# Patient Record
Sex: FEMALE | Race: WHITE | NOT HISPANIC OR LATINO | Employment: PART TIME | URBAN - METROPOLITAN AREA
[De-identification: names, ages, dates, MRNs, and addresses within clinical notes are randomized per-mention and may not be internally consistent; named-entity substitution may affect disease eponyms.]

---

## 2017-01-18 ENCOUNTER — ALLSCRIPTS OFFICE VISIT (OUTPATIENT)
Dept: OTHER | Facility: OTHER | Age: 42
End: 2017-01-18

## 2017-06-30 ENCOUNTER — ALLSCRIPTS OFFICE VISIT (OUTPATIENT)
Dept: OTHER | Facility: OTHER | Age: 42
End: 2017-06-30

## 2018-01-12 NOTE — PSYCH
Psych Med Mgmt    Appearance: was calm and cooperative  Observed mood: anxious  Observed mood: affect was tearful  Speech: pressured  Thought processes: coherent/organized  Hallucinations: no hallucinations present  Thought Content: no delusions  Abnormal Thoughts: The patient has no suicidal thoughts and no homicidal thoughts  Orientation: The patient is oriented to person, place and time  Recent and Remote Memory: short term memory intact and long term memory intact  Attention Span And Concentration: concentration intact  Insight: Insight intact  Judgment: Her judgment was intact  Muscle Strength And Tone  Normal gait and station  Individual Psychotherapy minutes provided today  Goals addressed in session: Medications/ chronicity of her anxiety/depression  Need for mediation long term  Potential side effects         Treatment Recommendations: Effexor  mg 2 po qd  Xanax 1 mg po tid prn  Risks, Benefits And Possible Side Effects Of Medications: Risks, benefits, and possible side effects of medications explained to patient and patient verbalizes understanding  Discussed with patient Black Box warning on concurrent use of benzodiazepines and opioid medications including sedation, respiratory depression, coma and death  Patient understands the risk of treatment with benzodiazepines in addition to opioids and wants to continue taking those medications  She reports normal appetite, normal energy level, no weight change and normal number of sleep hours  Pt seen for follow up MDD/ KATHERYN  Pt overall states she is "managing" things  SHe is feeling good but not "wonderful"- she is able to enjoy herself and spending time with family  She has difficulty functioning when she does not have a schedule  She had some difficulties over holidays due to disruption in her schedule  She appears anxious with rambling speech but coherent  Less tearful   She is needing xanax at night  She is driving by herself but continues with panic/ anxiety- especially on long trips  She states her  is working on developing his own business and has been on "the road" more  She states this has been helpful because she has been forced to d more things  She is also working again part time doing taxes  Her children are 8and 10years old  Vitals  Signs   Recorded: 61XOU6456 11:56AM   Heart Rate: 68  Respiration: 16  Systolic: 765  Diastolic: 71  Height: 5 ft 4 in  Weight: 190 lb   BMI Calculated: 32 61  BSA Calculated: 1 91    DSM    Provisional Diagnosis: MDD  KATHERYN  Assessment    1  Generalized anxiety disorder (300 02) (F41 1)   2  Recurrent major depressive disorder (296 30) (F33 9)    Plan    1  ALPRAZolam 1 MG Oral Tablet; 1 po tid prn    2  Omeprazole 40 MG Oral Capsule Delayed Release    3  Venlafaxine HCl  MG Oral Tablet Extended Release 24 Hour; 2 CAPS PO   QD    Review of Systems    Constitutional: No fever, no chills, feels well, no tiredness, no recent weight gain or loss  Cardiovascular: no complaints of slow or fast heart rate, no chest pain, no palpitations  Respiratory: no complaints of shortness of breath, no wheezing, no dyspnea on exertion  Gastrointestinal: no complaints of abdominal pain, no constipation, no nausea, no diarrhea, no vomiting  Genitourinary: no complaints of dysuria, no incontinence, no pelvic pain, no urinary frequency  Musculoskeletal: no complaints of arthralgia, no myalgias, no limb pain, no joint stiffness  Integumentary: no complaints of skin rash, no itching, no dry skin  Neurological: no complaints of headache, no confusion, no numbness, no dizziness  Active Problems    1  Chronic fatigue (780 79) (R53 82)   2  Generalized anxiety disorder (300 02) (F41 1)   3  GERD (gastroesophageal reflux disease) (530 81) (K21 9)   4  Migraine (346 90) (G43 909)   5  Other specified anxiety disorders (300 09) (F41 8)   6  Pre-operative cardiovascular examination (V72 81) (Z01 810)   7  Recurrent major depressive disorder (296 30) (F33 9)   8  Varicose veins of leg with edema, 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)   9  Varicose veins of leg with pain, 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 819)   10  Vitamin D deficiency (268 9) (E55 9)    Past Medical History    1  Normal delivery 21   9)    Allergies    1  No Known Drug Allergies    Current Meds   1  ALPRAZolam 1 MG Oral Tablet; 1 po tid prn; Last Rx:16Nov2016 Ordered   2  Butalbital-APAP-Caffeine -40 MG Oral Tablet; TAKE 1 TABLET 3 TIMES DAILY AS   NEEDED; Therapy: 92Fjk8462 to (Evaluate:86Otf2905)  Requested for: 02Tsl0518; Last   Rx:54Svl4991 Ordered   3  Multiple Vitamins Oral Tablet; Take 1 daily; Therapy: 52CUF7388 to (Last Rx:01Oct2014) Ordered   4  Omeprazole 40 MG Oral Capsule Delayed Release; TAKE 1 CAPSULE DAILY; Therapy: 14Uxv7437 to (Evaluate:68Twq0680)  Requested for: 74Hez6057; Last   Rx:23Wlq7087 Ordered   5  Venlafaxine HCl  MG Oral Tablet Extended Release 24 Hour; 2 CAPS PO QD    Requested for: 98FZO9660; Last Rx:16Nov2016 Ordered    The medication list was reviewed and updated today  Family Psych History  Mother    1  No pertinent family history    The family history was reviewed and updated today  Social History    ·    · Never A Smoker   · No alcohol use  The social history was reviewed and updated today  The social history was reviewed and is unchanged  End of Encounter Meds    1  Multiple Vitamins Oral Tablet; Take 1 daily; Therapy: 12IJE5249 to (Last Rx:01Oct2014) Ordered    2  ALPRAZolam 1 MG Oral Tablet; 1 po tid prn; Last Rx:18Jan2017 Ordered    3  Butalbital-APAP-Caffeine -40 MG Oral Tablet; TAKE 1 TABLET 3 TIMES DAILY AS   NEEDED; Therapy: 29Oce2788 to (Evaluate:14Dyy5052)  Requested for: 33Pck4433; Last   Rx:51Del8820 Ordered    4   Venlafaxine HCl  MG Oral Tablet Extended Release 24 Hour; 2 CAPS PO QD    Requested for: 90GHG9877; Last Rx:18Jan2017; Status: 1554 Sur[facility]  to Pharmacy -   Awaiting Verification Ordered    Signatures   Electronically signed by : Calderon Hanks [facility]; Jan 18 2017 12:04PM EST                       (Author)    Electronically signed by : Criss Long MD; Jan 18 2017  5:20PM EST

## 2018-01-15 VITALS
BODY MASS INDEX: 32.44 KG/M2 | HEART RATE: 68 BPM | RESPIRATION RATE: 16 BRPM | DIASTOLIC BLOOD PRESSURE: 71 MMHG | SYSTOLIC BLOOD PRESSURE: 112 MMHG | WEIGHT: 190 LBS | HEIGHT: 64 IN

## 2018-01-16 NOTE — PSYCH
Psych Med Mgmt    Appearance: was calm and cooperative  Observed mood: anxious  Observed mood: affect was constricted  Speech: pressured  Thought processes: coherent/organized  Hallucinations: no hallucinations present  Thought Content: no delusions  Abnormal Thoughts: The patient has no suicidal thoughts and no homicidal thoughts  Orientation: The patient is oriented to person, place and time  Recent and Remote Memory: short term memory intact and long term memory intact  Attention Span And Concentration: concentration intact  Insight: Insight intact  Judgment: Her judgment was intact  Treatment Recommendations: Effexor  mg 2 po qd  Xanax 1 mg po qid prn  Risks, Benefits And Possible Side Effects Of Medications: Risks, benefits, and possible side effects of medications explained to patient and patient verbalizes understanding  Discussed with patient the risks of sedation, respiratory depression, impairment of ability to drive and potential for abuse and addiction related to treatment with benzodiazepine medications  The patient understands risk of treatment with benzodiazepine medications, agrees to not drive if feels impaired and agrees to take medications as prescribed  She reports normal appetite, normal energy level, no weight change and normal number of sleep hours  Pt seen for follow up Major Depression/ KATHERYN  Pt states she lost her insurance benefits November last year so has been unable to come in  She states her spouse then lost his job  She continues with anxiety/ residual depression  On a positive note 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/ flexible hours  She states she has been off the wellbutrin for 3 weeks  She states she had some increased headaches which are improving the past few days  She continues with residual anxiety/ depression/ easily tearful     She also has symptoms of seasonal affective disorder and discussed  Vitals  Signs   Recorded: 62IHL0620 04:26ZI   Systolic: 005  Diastolic: 73  Heart Rate: 74  Respiration: 16  Height: 5 ft 4 in  Weight: 190 lb   BMI Calculated: 32 61  BSA Calculated: 1 91    DSM    Provisional Diagnosis: KATHERYN  MDD  Assessment    1  Major depression, recurrent (296 30) (F33 9)   2  Generalized anxiety disorder (300 02) (F41 1)    Plan    1  ALPRAZolam 1 MG Oral Tablet; 1 po tid prn    2  Venlafaxine HCl  MG Oral Tablet Extended Release 24 Hour; 2 CAPS PO   QD    Review of Systems    Constitutional: No fever, no chills, feels well, no tiredness, no recent weight gain or loss  Cardiovascular: no complaints of slow or fast heart rate, no chest pain, no palpitations  Respiratory: no complaints of shortness of breath, no wheezing, no dyspnea on exertion  Gastrointestinal: no complaints of abdominal pain, no constipation, no nausea, no diarrhea, no vomiting  Genitourinary: no complaints of dysuria, no incontinence, no pelvic pain, no urinary frequency  Musculoskeletal: no complaints of arthralgia, no myalgias, no limb pain, no joint stiffness  Integumentary: no complaints of skin rash, no itching, no dry skin  Neurological: no complaints of headache, no confusion, no numbness, no dizziness  Active Problems    1  Chronic fatigue (780 79) (R53 82)   2  Generalized anxiety disorder (300 02) (F41 1)   3  GERD (gastroesophageal reflux disease) (530 81) (K21 9)   4  Major depression, recurrent (296 30) (F33 9)   5  Migraine (346 90) (G43 909)   6  Other specified anxiety disorders (300 09) (F41 8)   7  Pre-operative cardiovascular examination (V72 81) (Z01 810)   8  Varicose veins of leg with edema, 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)   9  Varicose veins of leg with pain, 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 819)   10  Vitamin D deficiency (268 9) (E55 9)    Past Medical History    1  Normal delivery 21   9)    Allergies    1   No Known Drug Allergies    Current Meds   1  ALPRAZolam 1 MG Oral Tablet; 1 po tid prn; Last Rx:16Jun2016 Ordered   2  Butalbital-APAP-Caffeine -40 MG Oral Tablet; TAKE 1 TABLET 3 TIMES DAILY AS   NEEDED; Therapy: 01Uwk0356 to (Evaluate:09Gop1628)  Requested for: 82Hkk4757; Last   Rx:01Yjb6344 Ordered   3  Multiple Vitamins Oral Tablet; Take 1 daily; Therapy: 50LNZ1993 to (Last Rx:01Oct2014) Ordered   4  Omeprazole 40 MG Oral Capsule Delayed Release; TAKE 1 CAPSULE DAILY; Therapy: 63Wue9832 to (Evaluate:86Zrt2292)  Requested for: 69Eyr8774; Last   Rx:87Mdo8342 Ordered   5  SUMAtriptan Succinate 50 MG Oral Tablet; TAKE 1 TABLET FOR MIGRAINE RELIEF  MAY   REPEAT EVERY 2 HOURS  MAX 200MG/DAY; Therapy: 74Wgk5090 to (Last Rx:17Sep2014)  Requested for: 40Bdn0404 Ordered   6  Venlafaxine HCl  MG Oral Tablet Extended Release 24 Hour; 2 CAPS PO QD    Requested for: 30LFU6151; Last Rx:16Jun2016 Ordered    The medication list was reviewed and updated today  Family Psych History  Mother    1  No pertinent family history    The family history was reviewed and updated today  Social History    · Never A Smoker   · No alcohol use  The social history was reviewed and updated today  The social history was reviewed and is unchanged  End of Encounter Meds    1  Multiple Vitamins Oral Tablet; Take 1 daily; Therapy: 15DLV4600 to (Last Rx:01Oct2014) Ordered    2  ALPRAZolam 1 MG Oral Tablet; 1 po tid prn; Last Rx:16Nov2016 Ordered    3  Omeprazole 40 MG Oral Capsule Delayed Release; TAKE 1 CAPSULE DAILY; Therapy: 66Gvb0664 to (Evaluate:51Zxt3321)  Requested for: 16Jpc1293; Last   Rx:59Vkn5139 Ordered    4  Venlafaxine HCl  MG Oral Tablet Extended Release 24 Hour; 2 CAPS PO QD    Requested for: 87XOU1606; Last Rx:16Nov2016 Ordered    5  Butalbital-APAP-Caffeine -40 MG Oral Tablet; TAKE 1 TABLET 3 TIMES DAILY AS   NEEDED; Therapy: 61Joa9136 to (Evaluate:13Ddh3181)  Requested for: 83Uei9462;  Last Rx: 73QOK5373 Ordered   6  SUMAtriptan Succinate 50 MG Oral Tablet; TAKE 1 TABLET FOR MIGRAINE RELIEF  MAY   REPEAT EVERY 2 HOURS  MAX 200MG/DAY;    Therapy: 03Jyf7967 to (Last Rx:77Fsz4184)  Requested for: 15Tqk1314 Ordered    Signatures   Electronically signed by : Chikis Gusman, [facility]; Nov 16 2016 12:03PM EST                       (Author)    Electronically signed by : Kevin Dao MD; Nov 16 2016  2:16PM EST

## 2018-01-17 NOTE — PSYCH
Psych Med Mgmt    Appearance: was calm and cooperative and good eye contact  Observed mood: anxious  Observed mood:  anxious/ s/w tearful  Speech: pressured  Thought processes: coherent/organized  Hallucinations: no hallucinations present  Thought Content: no delusions  Abnormal Thoughts: The patient has no suicidal thoughts and no homicidal thoughts  Orientation: The patient is oriented to person, place and time  Recent and Remote Memory: short term memory intact and long term memory intact  Attention Span And Concentration: concentration intact  Insight: Insight intact  Judgment: Her judgment was intact  Muscle Strength And Tone  Normal gait and station  Treatment Recommendations: Effexor  mg po bid  Xanax 1 mg po tid prn    Discussed possibility of reducing effexor in future to 225 mg qd  Risks, Benefits And Possible Side Effects Of Medications: Risks, benefits, and possible side effects of medications explained to patient and patient verbalizes understanding  She reports normal appetite, normal energy level, no weight change and normal number of sleep hours  Pt seen for follow up MDD/KATHERYN  Pt states she has been doing "okay"  She states she feels better in the summer months because she enjoys being outside  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 and states she did okay  She is considering looking for consistent part time work  She continues with residual anxiety but feels as though it is more manageable with the medications  She has been driving short distances and doing better with that  No new meds or medical issues and feels well physically  Pt states her biological father who lived in [place] committed suicide Memorial Day by putting gasoline in his car lit car on fire  She has seen her father once in 27 years at her grandmothers  10 years   She states she handled things very well and her anxiety has been manageable  She sates her step father is who raised her so she is dealing with this fairly well  She also reports her biological father had addiction issues and was [place]  Vitals  Signs   Recorded: 92FVE0780 10:33AM   Heart Rate: 72  Systolic: 351  Diastolic: 69  Recorded: 89ERF6351 10:30AM   Respiration: 16  Height: 5 ft 4 in  Weight: 190 lb   BMI Calculated: 32 61  BSA Calculated: 1 91    DSM    Provisional Diagnosis: KATHERYN  MDD  Assessment    1  Generalized anxiety disorder (300 02) (F41 1)   2  Recurrent major depressive disorder (296 30) (F33 9)    Plan    1  ALPRAZolam 1 MG Oral Tablet; 1 po tid prn    2  Venlafaxine HCl  MG Oral Tablet Extended Release 24 Hour; 2 CAPS PO   QD    Review of Systems    Constitutional: No fever, no chills, feels well, no tiredness, no recent weight gain or loss  Cardiovascular: no complaints of slow or fast heart rate, no chest pain, no palpitations  Respiratory: no complaints of shortness of breath, no wheezing, no dyspnea on exertion  Gastrointestinal: no complaints of abdominal pain, no constipation, no nausea, no diarrhea, no vomiting  Genitourinary: no complaints of dysuria, no incontinence, no pelvic pain, no urinary frequency  Musculoskeletal: no complaints of arthralgia, no myalgias, no limb pain, no joint stiffness  Integumentary: no complaints of skin rash, no itching, no dry skin  Neurological: no complaints of headache, no confusion, no numbness, no dizziness  Active Problems    1  Chronic fatigue (780 79) (R53 82)   2  Generalized anxiety disorder (300 02) (F41 1)   3  GERD (gastroesophageal reflux disease) (530 81) (K21 9)   4  Migraine (346 90) (G43 909)   5  Other specified anxiety disorders (300 09) (F41 8)   6  Pre-operative cardiovascular examination (V72 81) (Z01 810)   7  Recurrent major depressive disorder (296 30) (F33 9)   8  Varicose veins of leg with edema, 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)   9   Varicose veins of leg with pain, unspecified laterality (454 8) (I20 819)   10  Vitamin D deficiency (268 9) (E55 9)    Past Medical History    1  Normal delivery 21   9)    Allergies    1  No Known Drug Allergies    Current Meds   1  ALPRAZolam 1 MG Oral Tablet; 1 po tid prn; Last Rx:18Jan2017 Ordered   2  Butalbital-APAP-Caffeine -40 MG Oral Tablet; TAKE 1 TABLET 3 TIMES DAILY AS   NEEDED; Therapy: 74Zdq4636 to (Evaluate:54Gqm5669)  Requested for: 46Hzo4328; Last   Rx:06Nzu3380 Ordered   3  Multiple Vitamins Oral Tablet; Take 1 daily; Therapy: 44RZP0039 to (Last Rx:01Oct2014) Ordered   4  Venlafaxine HCl  MG Oral Tablet Extended Release 24 Hour; 2 CAPS PO QD    Requested for: 22AVV0757; Last Rx:18Jan2017; Status: ACTIVE - Transmit to Pharmacy -   Awaiting Verification Ordered    The medication list was reviewed and updated today  Family Psych History  Mother    1  No pertinent family history  Father    2  Family history of suicide (V17 0) (Z81 8)    The family history was reviewed and updated today  Social History    ·    · Never A Smoker   · No alcohol use  The social history was reviewed and is unchanged  End of Encounter Meds    1  Multiple Vitamins Oral Tablet; Take 1 daily; Therapy: 86AIC4252 to (Last Rx:01Oct2014) Ordered    2  ALPRAZolam 1 MG Oral Tablet; 1 po tid prn; Last Rx:30Jun2017 Ordered    3  Butalbital-APAP-Caffeine -40 MG Oral Tablet; TAKE 1 TABLET 3 TIMES DAILY AS   NEEDED; Therapy: 24Fbs6645 to (Evaluate:27Szs2816)  Requested for: 08Mdz3014; Last   Rx:37Kxs6296 Ordered    4   Venlafaxine HCl  MG Oral Tablet Extended Release 24 Hour; 2 CAPS PO QD    Requested for: 96YGA0893; Last AB:63OXL9388; Status: ACTIVE - Transmit to [facility] Verification Ordered    Signatures   Electronically signed by : Alexandrea Santillan, [facility]; Jun 30 2017 10:36AM EST                       (Author)    Electronically signed by : Omega Fernández MD; Jul 5 2017 4:54PM EST

## 2018-01-22 VITALS
WEIGHT: 190 LBS | DIASTOLIC BLOOD PRESSURE: 69 MMHG | BODY MASS INDEX: 32.44 KG/M2 | HEIGHT: 64 IN | RESPIRATION RATE: 16 BRPM | SYSTOLIC BLOOD PRESSURE: 102 MMHG | HEART RATE: 72 BPM

## 2018-07-12 RX ORDER — BUTALBITAL, ACETAMINOPHEN AND CAFFEINE 50; 325; 40 MG/1; MG/1; MG/1
1 TABLET ORAL 3 TIMES DAILY PRN
COMMUNITY
Start: 2014-09-17 | End: 2018-07-16 | Stop reason: SDUPTHER

## 2018-07-12 RX ORDER — ALPRAZOLAM 1 MG/1
TABLET ORAL
COMMUNITY
End: 2018-07-16 | Stop reason: SDUPTHER

## 2018-07-12 RX ORDER — VENLAFAXINE HYDROCHLORIDE 150 MG/1
1 TABLET, EXTENDED RELEASE ORAL DAILY
COMMUNITY
End: 2018-07-16 | Stop reason: SDUPTHER

## 2018-07-12 RX ORDER — BUPROPION HYDROCHLORIDE 150 MG/1
1 TABLET ORAL
COMMUNITY
Start: 2015-04-22 | End: 2018-07-16 | Stop reason: ALTCHOICE

## 2018-07-16 ENCOUNTER — OFFICE VISIT (OUTPATIENT)
Dept: FAMILY MEDICINE CLINIC | Facility: CLINIC | Age: 43
End: 2018-07-16
Payer: COMMERCIAL

## 2018-07-16 VITALS
HEIGHT: 62 IN | DIASTOLIC BLOOD PRESSURE: 68 MMHG | TEMPERATURE: 98 F | HEART RATE: 78 BPM | SYSTOLIC BLOOD PRESSURE: 98 MMHG | BODY MASS INDEX: 35.99 KG/M2 | WEIGHT: 195.6 LBS | RESPIRATION RATE: 16 BRPM | OXYGEN SATURATION: 98 %

## 2018-07-16 DIAGNOSIS — K21.9 GASTROESOPHAGEAL REFLUX DISEASE, ESOPHAGITIS PRESENCE NOT SPECIFIED: ICD-10-CM

## 2018-07-16 DIAGNOSIS — F41.9 ANXIETY: Primary | ICD-10-CM

## 2018-07-16 DIAGNOSIS — Z12.31 ENCOUNTER FOR SCREENING MAMMOGRAM FOR BREAST CANCER: ICD-10-CM

## 2018-07-16 DIAGNOSIS — E55.9 VITAMIN D INSUFFICIENCY: ICD-10-CM

## 2018-07-16 DIAGNOSIS — Z13.83 SCREENING FOR CARDIOVASCULAR, RESPIRATORY, AND GENITOURINARY DISEASES: ICD-10-CM

## 2018-07-16 DIAGNOSIS — Z13.6 SCREENING FOR CARDIOVASCULAR, RESPIRATORY, AND GENITOURINARY DISEASES: ICD-10-CM

## 2018-07-16 DIAGNOSIS — Z13.89 SCREENING FOR CARDIOVASCULAR, RESPIRATORY, AND GENITOURINARY DISEASES: ICD-10-CM

## 2018-07-16 DIAGNOSIS — G43.909 MIGRAINE WITHOUT STATUS MIGRAINOSUS, NOT INTRACTABLE, UNSPECIFIED MIGRAINE TYPE: ICD-10-CM

## 2018-07-16 PROCEDURE — 99214 OFFICE O/P EST MOD 30 MIN: CPT | Performed by: FAMILY MEDICINE

## 2018-07-16 PROCEDURE — 3725F SCREEN DEPRESSION PERFORMED: CPT | Performed by: FAMILY MEDICINE

## 2018-07-16 RX ORDER — VENLAFAXINE HYDROCHLORIDE 75 MG/1
75 TABLET, EXTENDED RELEASE ORAL
Qty: 30 TABLET | Refills: 3 | Status: SHIPPED | OUTPATIENT
Start: 2018-07-16 | End: 2018-08-24

## 2018-07-16 RX ORDER — ALPRAZOLAM 1 MG/1
1 TABLET ORAL 3 TIMES DAILY PRN
Qty: 90 TABLET | Refills: 0 | Status: SHIPPED | OUTPATIENT
Start: 2018-07-16 | End: 2018-08-24 | Stop reason: SDUPTHER

## 2018-07-16 RX ORDER — BUTALBITAL, ACETAMINOPHEN AND CAFFEINE 50; 325; 40 MG/1; MG/1; MG/1
1 TABLET ORAL 3 TIMES DAILY PRN
Qty: 60 TABLET | Refills: 1 | Status: SHIPPED | OUTPATIENT
Start: 2018-07-16 | End: 2018-10-04 | Stop reason: SDUPTHER

## 2018-07-16 NOTE — PROGRESS NOTES
Assessment/Plan:     Diagnoses and all orders for this visit:    Anxiety  Comments:  Patient medications refilled  Will continue to wean off of Effexor  Educated patient will slow taper from 150mg daily to 75mg daily  Orders:  -     CBC and differential; Future  -     TSH, 3rd generation with Free T4 reflex; Future  -     ALPRAZolam (XANAX) 1 mg tablet; Take 1 tablet (1 mg total) by mouth 3 (three) times a day as needed for anxiety for up to 30 days  -     venlafaxine 75 mg 24 hr tablet; Take 1 tablet (75 mg total) by mouth daily with breakfast    Gastroesophageal reflux disease, esophagitis presence not specified  Comments:  Stable  Vitamin D insufficiency  Comments:  lab ordered  Orders:  -     Vitamin D 25 hydroxy; Future    Screening for cardiovascular, respiratory, and genitourinary diseases  Comments:  Labs ordered  Orders:  -     Comprehensive metabolic panel; Future  -     Hemoglobin A1C; Future  -     Lipid Panel with Direct LDL reflex; Future    Migraine without status migrainosus, not intractable, unspecified migraine type  Comments:  Stable with current medication  Referral to neurology placed at patient's request   Orders:  -     Ambulatory referral to Neurology; Future  -     butalbital-acetaminophen-caffeine (FIORICET,ESGIC) -40 mg per tablet; Take 1 tablet by mouth 3 (three) times a day as needed for migraine for up to 30 days    Encounter for screening mammogram for breast cancer  Comments:  mammogram ordered  Orders:  -     Mammo screening bilateral w cad; Future    Other orders  -     Discontinue: ALPRAZolam (XANAX) 1 mg tablet; Take by mouth  -     Discontinue: buPROPion (WELLBUTRIN XL) 150 mg 24 hr tablet; Take 1 tablet by mouth  -     Discontinue: butalbital-acetaminophen-caffeine (FIORICET,ESGIC) -40 mg per tablet; Take 1 tablet by mouth 3 (three) times a day as needed  -     Multiple Vitamins-Minerals (MULTIVITAMIN ADULT PO);  Take by mouth daily  -     Discontinue: venlafaxine 150 MG TB24; Take 1 capsule by mouth daily    -     Ambulatory referral to Neurology; Future          There are no Patient Instructions on file for this visit  Return in about 1 month (around 8/16/2018) for Annual physical     Subjective:      Patient ID: Hakeem Valentino is a 37 y o  female  Chief Complaint   Patient presents with    Physical Exam    Anxiety     requesting refill meds       36 y/o female presenting to office for medication refill  Patient had been seeing psychiatrist for medications  Psychiatrist no longer covers her insurance, she found another practice but cannot get an appointment until December so she is following with PCP in the meantime  Patient has no complaints or concerns  She had begun decreasing dose of Effexor prior to leaving the practice and wants to continue weaning off Effexor  Patient feels her anxiety is controlled with current medication  Patient also has a history of migraines  She has never seen neurology and would like a referral  Patient will follow up in office in 1 month for annual physical       Anxiety   Patient reports no chest pain, dizziness, nervous/anxious behavior, palpitations or shortness of breath  The following portions of the patient's history were reviewed and updated as appropriate: allergies, current medications, past family history, past medical history, past social history, past surgical history and problem list     Review of Systems   Constitutional: Negative for chills and fever  HENT: Negative for trouble swallowing  Eyes: Negative for visual disturbance  Respiratory: Negative for cough and shortness of breath  Cardiovascular: Negative for chest pain, palpitations and leg swelling  Gastrointestinal: Negative for abdominal pain, constipation and diarrhea  Genitourinary: Negative for difficulty urinating and dysuria  Musculoskeletal: Negative for gait problem  Skin: Negative for rash     Neurological: Negative for dizziness, tremors, seizures, light-headedness and headaches  Hematological: Negative for adenopathy  Psychiatric/Behavioral: Negative for behavioral problems  The patient is not nervous/anxious  Current Outpatient Prescriptions   Medication Sig Dispense Refill    ALPRAZolam (XANAX) 1 mg tablet Take 1 tablet (1 mg total) by mouth 3 (three) times a day as needed for anxiety for up to 30 days 90 tablet 0    butalbital-acetaminophen-caffeine (FIORICET,ESGIC) -40 mg per tablet Take 1 tablet by mouth 3 (three) times a day as needed for migraine for up to 30 days 60 tablet 1    Multiple Vitamins-Minerals (MULTIVITAMIN ADULT PO) Take by mouth daily      venlafaxine 75 mg 24 hr tablet Take 1 tablet (75 mg total) by mouth daily with breakfast 30 tablet 3     No current facility-administered medications for this visit  Objective:    BP 98/68 (BP Location: Left arm, Patient Position: Sitting, Cuff Size: Adult)   Pulse 78   Temp 98 °F (36 7 °C) (Tympanic)   Resp 16   Ht 5' 1 5" (1 562 m)   Wt 88 7 kg (195 lb 9 6 oz)   SpO2 98%   BMI 36 36 kg/m²        Physical Exam   Constitutional: She is oriented to person, place, and time  She appears well-developed and well-nourished  No distress  HENT:   Head: Normocephalic and atraumatic  Right Ear: External ear normal    Left Ear: External ear normal    Eyes: EOM are normal  Pupils are equal, round, and reactive to light  Neck: Normal range of motion  Neck supple  No tracheal deviation present  Cardiovascular: Normal rate, regular rhythm and normal heart sounds  No murmur heard  Pulmonary/Chest: Effort normal and breath sounds normal  No respiratory distress  She has no wheezes  She has no rales  Abdominal: Soft  Bowel sounds are normal    Musculoskeletal: Normal range of motion  She exhibits no edema  Neurological: She is alert and oriented to person, place, and time  No cranial nerve deficit  Skin: Skin is warm   No erythema  Psychiatric: She has a normal mood and affect  Her behavior is normal    Nursing note and vitals reviewed               Joanne Garnica MD

## 2018-07-18 ENCOUNTER — TELEPHONE (OUTPATIENT)
Dept: FAMILY MEDICINE CLINIC | Facility: CLINIC | Age: 43
End: 2018-07-18

## 2018-07-18 NOTE — TELEPHONE ENCOUNTER
Pt was seen 7/16/18  She was given a referral to Neurology  She left her copy of the physician referral in the office  L/M for pt to see if she had another copy or needed assistance to sched  , if so please call office

## 2018-08-02 ENCOUNTER — APPOINTMENT (OUTPATIENT)
Dept: LAB | Facility: IMAGING CENTER | Age: 43
End: 2018-08-02
Payer: COMMERCIAL

## 2018-08-02 ENCOUNTER — TRANSCRIBE ORDERS (OUTPATIENT)
Dept: ADMINISTRATIVE | Facility: HOSPITAL | Age: 43
End: 2018-08-02

## 2018-08-02 ENCOUNTER — HOSPITAL ENCOUNTER (OUTPATIENT)
Dept: RADIOLOGY | Facility: IMAGING CENTER | Age: 43
Discharge: HOME | End: 2018-08-02
Payer: COMMERCIAL

## 2018-08-02 DIAGNOSIS — Z13.89 SCREENING FOR CARDIOVASCULAR, RESPIRATORY, AND GENITOURINARY DISEASES: ICD-10-CM

## 2018-08-02 DIAGNOSIS — Z12.31 ENCOUNTER FOR SCREENING MAMMOGRAM FOR BREAST CANCER: ICD-10-CM

## 2018-08-02 DIAGNOSIS — F41.9 ANXIETY: ICD-10-CM

## 2018-08-02 DIAGNOSIS — Z13.6 SCREENING FOR CARDIOVASCULAR, RESPIRATORY, AND GENITOURINARY DISEASES: ICD-10-CM

## 2018-08-02 DIAGNOSIS — Z13.83 SCREENING FOR CARDIOVASCULAR, RESPIRATORY, AND GENITOURINARY DISEASES: ICD-10-CM

## 2018-08-02 DIAGNOSIS — E55.9 VITAMIN D INSUFFICIENCY: ICD-10-CM

## 2018-08-02 LAB
25(OH)D3 SERPL-MCNC: 16.2 NG/ML (ref 30–100)
ALBUMIN SERPL BCP-MCNC: 3.5 G/DL (ref 3.5–5)
ALP SERPL-CCNC: 50 U/L (ref 46–116)
ALT SERPL W P-5'-P-CCNC: 18 U/L (ref 12–78)
ANION GAP SERPL CALCULATED.3IONS-SCNC: 7 MMOL/L (ref 4–13)
AST SERPL W P-5'-P-CCNC: 12 U/L (ref 5–45)
BASOPHILS # BLD AUTO: 0.03 THOUSANDS/ΜL (ref 0–0.1)
BASOPHILS NFR BLD AUTO: 1 % (ref 0–1)
BILIRUB SERPL-MCNC: 0.51 MG/DL (ref 0.2–1)
BUN SERPL-MCNC: 10 MG/DL (ref 5–25)
CALCIUM SERPL-MCNC: 8.9 MG/DL (ref 8.3–10.1)
CHLORIDE SERPL-SCNC: 104 MMOL/L (ref 100–108)
CHOLEST SERPL-MCNC: 180 MG/DL (ref 50–200)
CO2 SERPL-SCNC: 27 MMOL/L (ref 21–32)
CREAT SERPL-MCNC: 0.62 MG/DL (ref 0.6–1.3)
EOSINOPHIL # BLD AUTO: 0.07 THOUSAND/ΜL (ref 0–0.61)
EOSINOPHIL NFR BLD AUTO: 1 % (ref 0–6)
ERYTHROCYTE [DISTWIDTH] IN BLOOD BY AUTOMATED COUNT: 14.1 % (ref 11.6–15.1)
GFR SERPL CREATININE-BSD FRML MDRD: 111 ML/MIN/1.73SQ M
GLUCOSE P FAST SERPL-MCNC: 83 MG/DL (ref 65–99)
HCT VFR BLD AUTO: 40.8 % (ref 34.8–46.1)
HDLC SERPL-MCNC: 59 MG/DL (ref 40–60)
HGB BLD-MCNC: 12.4 G/DL (ref 11.5–15.4)
IMM GRANULOCYTES # BLD AUTO: 0.01 THOUSAND/UL (ref 0–0.2)
IMM GRANULOCYTES NFR BLD AUTO: 0 % (ref 0–2)
LDLC SERPL CALC-MCNC: 104 MG/DL (ref 0–100)
LYMPHOCYTES # BLD AUTO: 1.54 THOUSANDS/ΜL (ref 0.6–4.47)
LYMPHOCYTES NFR BLD AUTO: 26 % (ref 14–44)
MCH RBC QN AUTO: 24.4 PG (ref 26.8–34.3)
MCHC RBC AUTO-ENTMCNC: 30.4 G/DL (ref 31.4–37.4)
MCV RBC AUTO: 80 FL (ref 82–98)
MONOCYTES # BLD AUTO: 0.38 THOUSAND/ΜL (ref 0.17–1.22)
MONOCYTES NFR BLD AUTO: 7 % (ref 4–12)
NEUTROPHILS # BLD AUTO: 3.81 THOUSANDS/ΜL (ref 1.85–7.62)
NEUTS SEG NFR BLD AUTO: 65 % (ref 43–75)
NRBC BLD AUTO-RTO: 0 /100 WBCS
PLATELET # BLD AUTO: 230 THOUSANDS/UL (ref 149–390)
PMV BLD AUTO: 13.5 FL (ref 8.9–12.7)
POTASSIUM SERPL-SCNC: 4.1 MMOL/L (ref 3.5–5.3)
PROT SERPL-MCNC: 7.2 G/DL (ref 6.4–8.2)
RBC # BLD AUTO: 5.08 MILLION/UL (ref 3.81–5.12)
SODIUM SERPL-SCNC: 138 MMOL/L (ref 136–145)
T4 FREE SERPL-MCNC: 0.84 NG/DL (ref 0.76–1.46)
TRIGL SERPL-MCNC: 83 MG/DL
TSH SERPL DL<=0.05 MIU/L-ACNC: 3.99 UIU/ML (ref 0.36–3.74)
WBC # BLD AUTO: 5.84 THOUSAND/UL (ref 4.31–10.16)

## 2018-08-02 PROCEDURE — 84443 ASSAY THYROID STIM HORMONE: CPT

## 2018-08-02 PROCEDURE — 82306 VITAMIN D 25 HYDROXY: CPT

## 2018-08-02 PROCEDURE — 83036 HEMOGLOBIN GLYCOSYLATED A1C: CPT

## 2018-08-02 PROCEDURE — 85025 COMPLETE CBC W/AUTO DIFF WBC: CPT

## 2018-08-02 PROCEDURE — 36415 COLL VENOUS BLD VENIPUNCTURE: CPT

## 2018-08-02 PROCEDURE — 80053 COMPREHEN METABOLIC PANEL: CPT

## 2018-08-02 PROCEDURE — 80061 LIPID PANEL: CPT

## 2018-08-02 PROCEDURE — 84439 ASSAY OF FREE THYROXINE: CPT

## 2018-08-02 PROCEDURE — 77067 SCR MAMMO BI INCL CAD: CPT

## 2018-08-03 LAB
EST. AVERAGE GLUCOSE BLD GHB EST-MCNC: 100 MG/DL
HBA1C MFR BLD: 5.1 % (ref 4.2–6.3)

## 2018-08-10 DIAGNOSIS — E55.9 VITAMIN D INSUFFICIENCY: Primary | ICD-10-CM

## 2018-08-10 RX ORDER — ERGOCALCIFEROL 1.25 MG/1
50000 CAPSULE ORAL WEEKLY
Qty: 4 CAPSULE | Refills: 5 | Status: SHIPPED | OUTPATIENT
Start: 2018-08-10 | End: 2019-03-02 | Stop reason: SDUPTHER

## 2018-08-14 ENCOUNTER — TELEPHONE (OUTPATIENT)
Dept: FAMILY MEDICINE CLINIC | Facility: CLINIC | Age: 43
End: 2018-08-14

## 2018-08-14 ENCOUNTER — DOCUMENTATION (OUTPATIENT)
Dept: FAMILY MEDICINE CLINIC | Facility: CLINIC | Age: 43
End: 2018-08-14

## 2018-08-14 NOTE — TELEPHONE ENCOUNTER
Pt informed of Mammogram test results  She is sched for diag mammo of rt breast next week  Discussed lab results and pt will start vitamin D 50,000iu weekly  rx was sent to [facility]  Pt sched for PE 8/16/18 and will keep that appt

## 2018-08-24 ENCOUNTER — TELEPHONE (OUTPATIENT)
Dept: FAMILY MEDICINE CLINIC | Facility: CLINIC | Age: 43
End: 2018-08-24

## 2018-08-24 DIAGNOSIS — F41.9 ANXIETY: ICD-10-CM

## 2018-08-24 DIAGNOSIS — F41.9 ANXIETY: Primary | ICD-10-CM

## 2018-08-24 RX ORDER — ALPRAZOLAM 1 MG/1
1 TABLET ORAL 3 TIMES DAILY PRN
Qty: 90 TABLET | Refills: 0 | Status: SHIPPED | OUTPATIENT
Start: 2018-08-24 | End: 2018-08-31 | Stop reason: SDUPTHER

## 2018-08-24 RX ORDER — VENLAFAXINE HYDROCHLORIDE 75 MG/1
75 CAPSULE, EXTENDED RELEASE ORAL DAILY
Qty: 30 CAPSULE | Refills: 3 | Status: SHIPPED | OUTPATIENT
Start: 2018-08-24 | End: 2018-12-06 | Stop reason: SDUPTHER

## 2018-08-24 NOTE — TELEPHONE ENCOUNTER
Pt said she was on effexor 150mg ER and was cut back to 75mg but did not get the ER and was given the tablets plain effexor    Pharmacy gave her a few tabs of the 75mg ER to hold her over so needs a script sent for 75mg ER to [facility]      Also needs refill of xanax     Has an appt in 1 1/2 weeks

## 2018-08-28 ENCOUNTER — HOSPITAL ENCOUNTER (OUTPATIENT)
Dept: MAMMOGRAPHY | Facility: CLINIC | Age: 43
Discharge: HOME | End: 2018-08-28
Payer: COMMERCIAL

## 2018-08-28 ENCOUNTER — HOSPITAL ENCOUNTER (OUTPATIENT)
Dept: ULTRASOUND IMAGING | Facility: HOSPITAL | Age: 43
Discharge: HOME | End: 2018-08-28
Payer: COMMERCIAL

## 2018-08-28 DIAGNOSIS — R92.8 ABNORMAL MAMMOGRAM: ICD-10-CM

## 2018-08-28 PROCEDURE — 76642 ULTRASOUND BREAST LIMITED: CPT

## 2018-08-28 PROCEDURE — 77065 DX MAMMO INCL CAD UNI: CPT

## 2018-08-31 ENCOUNTER — OFFICE VISIT (OUTPATIENT)
Dept: FAMILY MEDICINE CLINIC | Facility: CLINIC | Age: 43
End: 2018-08-31
Payer: COMMERCIAL

## 2018-08-31 VITALS
HEART RATE: 88 BPM | RESPIRATION RATE: 16 BRPM | WEIGHT: 195.6 LBS | BODY MASS INDEX: 34.66 KG/M2 | TEMPERATURE: 98 F | OXYGEN SATURATION: 99 % | SYSTOLIC BLOOD PRESSURE: 110 MMHG | DIASTOLIC BLOOD PRESSURE: 74 MMHG | HEIGHT: 63 IN

## 2018-08-31 DIAGNOSIS — E55.9 VITAMIN D INSUFFICIENCY: ICD-10-CM

## 2018-08-31 DIAGNOSIS — F51.01 PRIMARY INSOMNIA: ICD-10-CM

## 2018-08-31 DIAGNOSIS — F33.41 RECURRENT MAJOR DEPRESSIVE DISORDER, IN PARTIAL REMISSION (HCC): ICD-10-CM

## 2018-08-31 DIAGNOSIS — F41.9 ANXIETY: Primary | ICD-10-CM

## 2018-08-31 PROCEDURE — 99214 OFFICE O/P EST MOD 30 MIN: CPT | Performed by: FAMILY MEDICINE

## 2018-08-31 RX ORDER — ALPRAZOLAM 1 MG/1
1 TABLET ORAL 3 TIMES DAILY PRN
Qty: 90 TABLET | Refills: 0 | Status: SHIPPED | OUTPATIENT
Start: 2018-08-31 | End: 2018-10-05 | Stop reason: SDUPTHER

## 2018-08-31 NOTE — PATIENT INSTRUCTIONS
It was discussed with patient about BuSpar 2 or 3 times a day for anxiety and trazodone 50 mg 1-2 hour before bed to help with sleeping and anxiety

## 2018-08-31 NOTE — PROGRESS NOTES
Assessment/Plan:     Diagnoses and all orders for this visit:    Anxiety  Comments:  Continue to wean her Effexor to 75 mg daily  It was discussed about BusPar  She was given refills for Xanax 1 mg 3 times a day as needed  Orders:  -     ALPRAZolam (XANAX) 1 mg tablet; Take 1 tablet (1 mg total) by mouth 3 (three) times a day as needed for anxiety for up to 30 days    Recurrent major depressive disorder, in partial remission ([facility] [address] )  Comments:  Stable with her medication adjustment    Primary insomnia  Comments: It was discussed about trazodone  She is to continue on Xanax q h s  for now  Vitamin D insufficiency  Comments:  Continue on vitamin-D  We will continue to monitor  Patient Instructions   It was discussed with patient about BuSpar 2 or 3 times a day for anxiety and trazodone 50 mg 1-2 hour before bed to help with sleeping and anxiety      No Follow-up on file  Subjective:      Patient ID: Justin Galindo is a 37 y o  female  Chief Complaint   Patient presents with    Physical Exam     review labs       She is here today for follow-up depression and anxiety  We continued to wean off her Effexor  She has been feeling more anxious lately because of changing medication strength  She has been taking more Xanax than usual   She denies feeling depressed but she is more anxious  She also having problem with insomnia but is well controlled with Xanax 1 mg at bedtime  The following portions of the patient's history were reviewed and updated as appropriate: allergies, current medications, past family history, past medical history, past social history, past surgical history and problem list     Review of Systems   Constitutional: Negative for chills and fever  HENT: Negative for trouble swallowing  Eyes: Negative for visual disturbance  Respiratory: Negative for cough and shortness of breath  Cardiovascular: Negative for chest pain, palpitations and leg swelling  Gastrointestinal: Negative for abdominal pain, constipation and diarrhea  Endocrine: Negative for cold intolerance and heat intolerance  Genitourinary: Negative for difficulty urinating and dysuria  Musculoskeletal: Negative for gait problem  Skin: Negative for rash  Neurological: Negative for dizziness, tremors, seizures and headaches  Hematological: Negative for adenopathy  Psychiatric/Behavioral: Negative for behavioral problems  The patient is nervous/anxious  Current Outpatient Prescriptions   Medication Sig Dispense Refill    ALPRAZolam (XANAX) 1 mg tablet Take 1 tablet (1 mg total) by mouth 3 (three) times a day as needed for anxiety for up to 30 days 90 tablet 0    ergocalciferol (VITAMIN D2) 50,000 units Take 1 capsule (50,000 Units total) by mouth once a week 4 capsule 5    Multiple Vitamins-Minerals (MULTIVITAMIN ADULT PO) Take by mouth daily      venlafaxine (EFFEXOR-XR) 75 mg 24 hr capsule Take 1 capsule (75 mg total) by mouth daily 30 capsule 3     No current facility-administered medications for this visit  Objective:    /74 (BP Location: Left arm, Patient Position: Sitting, Cuff Size: Adult)   Pulse 88   Temp 98 °F (36 7 °C) (Tympanic)   Resp 16   Ht 5' 2 5" (1 588 m)   Wt 88 7 kg (195 lb 9 6 oz)   SpO2 99%   BMI 35 21 kg/m²        Physical Exam   Constitutional: She is oriented to person, place, and time  She appears well-developed and well-nourished  HENT:   Head: Normocephalic and atraumatic  Eyes: EOM are normal  Pupils are equal, round, and reactive to light  Neck: Normal range of motion  Neck supple  Cardiovascular: Normal rate, regular rhythm and normal heart sounds  Pulmonary/Chest: Effort normal and breath sounds normal    Abdominal: Soft  Bowel sounds are normal    Musculoskeletal: Normal range of motion  She exhibits no edema  Lymphadenopathy:     She has no cervical adenopathy     Neurological: She is alert and oriented to person, place, and time  No cranial nerve deficit  Skin: Skin is warm  Psychiatric: She has a normal mood and affect  Nursing note and vitals reviewed               Joanne Garnica MD

## 2018-09-19 ENCOUNTER — OFFICE VISIT (OUTPATIENT)
Dept: FAMILY MEDICINE CLINIC | Facility: CLINIC | Age: 43
End: 2018-09-19
Payer: COMMERCIAL

## 2018-09-19 VITALS
HEIGHT: 63 IN | HEART RATE: 67 BPM | WEIGHT: 194.4 LBS | SYSTOLIC BLOOD PRESSURE: 114 MMHG | DIASTOLIC BLOOD PRESSURE: 70 MMHG | RESPIRATION RATE: 16 BRPM | BODY MASS INDEX: 34.45 KG/M2 | TEMPERATURE: 97.8 F | OXYGEN SATURATION: 98 %

## 2018-09-19 DIAGNOSIS — N39.0 URINARY TRACT INFECTION WITHOUT HEMATURIA, SITE UNSPECIFIED: Primary | ICD-10-CM

## 2018-09-19 DIAGNOSIS — Z88.9 H/O SEASONAL ALLERGIES: ICD-10-CM

## 2018-09-19 LAB
SL AMB  POCT GLUCOSE, UA: ABNORMAL
SL AMB LEUKOCYTE ESTERASE,UA: ABNORMAL
SL AMB POCT BILIRUBIN,UA: ABNORMAL
SL AMB POCT BLOOD,UA: ABNORMAL
SL AMB POCT CLARITY,UA: ABNORMAL
SL AMB POCT COLOR,UA: YELLOW
SL AMB POCT KETONES,UA: ABNORMAL
SL AMB POCT NITRITE,UA: ABNORMAL
SL AMB POCT PH,UA: 7
SL AMB POCT SPECIFIC GRAVITY,UA: 1.01
SL AMB POCT URINE PROTEIN: ABNORMAL
SL AMB POCT UROBILINOGEN: ABNORMAL

## 2018-09-19 PROCEDURE — 81002 URINALYSIS NONAUTO W/O SCOPE: CPT | Performed by: FAMILY MEDICINE

## 2018-09-19 PROCEDURE — 3008F BODY MASS INDEX DOCD: CPT | Performed by: FAMILY MEDICINE

## 2018-09-19 PROCEDURE — 99214 OFFICE O/P EST MOD 30 MIN: CPT | Performed by: FAMILY MEDICINE

## 2018-09-19 RX ORDER — CETIRIZINE HYDROCHLORIDE 10 MG/1
10 TABLET ORAL DAILY
Qty: 30 TABLET | Refills: 3 | Status: SHIPPED | OUTPATIENT
Start: 2018-09-19 | End: 2019-05-02 | Stop reason: SDUPTHER

## 2018-09-19 RX ORDER — SULFAMETHOXAZOLE AND TRIMETHOPRIM 800; 160 MG/1; MG/1
1 TABLET ORAL EVERY 12 HOURS SCHEDULED
Qty: 6 TABLET | Refills: 0 | Status: SHIPPED | OUTPATIENT
Start: 2018-09-19 | End: 2018-09-22

## 2018-09-19 NOTE — PROGRESS NOTES
Assessment/Plan:    No problem-specific Assessment & Plan notes found for this encounter  Diagnoses and all orders for this visit:    Urinary tract infection without hematuria, site unspecified  Comments:  Dysuria, still present, even after using Azo, will treat for 3 days and ask to come back for urine if symptoms does not go away  Ua shows blood becise of Mense  Orders:  -     sulfamethoxazole-trimethoprim (BACTRIM DS) 800-160 mg per tablet; Take 1 tablet by mouth every 12 (twelve) hours for 3 days    H/O seasonal allergies  Comments:  Patient doesn't have symptoms currently  Ordered zyrtec  Orders:  -     cetirizine (ZyrTEC) 10 mg tablet; Take 1 tablet (10 mg total) by mouth daily          Subjective:      Patient ID: Meche Mcnamara is a 37 y o  female  HPI  Izabella Jackman is a 38 yo female presenting with pressure on her bladder which causes an increased urgency to pee, and a burning sensation on her urethra, which has been constant, neither relieved nor worsened with urination  Her last menstrual period ended yesterday  She denies any blood in her urine, or a decrease in her stream  She denies flank pain as well  She also denies dyspareunia, vaginal discharge, pain, or itching  She has taken some Phenazopyridine which helped temporarily  She reports that the symptoms are similar to her previous UTI, but a less intense sensation  The following portions of the patient's history were reviewed and updated as appropriate: allergies, current medications, past family history, past medical history, past social history and problem list       Review of Systems   Constitutional: Negative  HENT: Negative  Eyes: Negative  Respiratory: Negative  Cardiovascular: Negative  Genitourinary: Positive for dysuria and urgency  Negative for decreased urine volume, dyspareunia, flank pain, frequency, hematuria, menstrual problem, pelvic pain, vaginal bleeding, vaginal discharge and vaginal pain  Objective:      /70 (BP Location: Left arm, Patient Position: Sitting, Cuff Size: Large)   Pulse 67   Temp 97 8 °F (36 6 °C) (Tympanic)   Resp 16   Ht 5' 2 5" (1 588 m)   Wt 88 2 kg (194 lb 6 4 oz)   SpO2 98%   BMI 34 99 kg/m²          Physical Exam   Constitutional: She appears well-developed and well-nourished  Cardiovascular: Normal rate, regular rhythm and normal heart sounds  Pulmonary/Chest: Effort normal and breath sounds normal    Neurological: She is alert

## 2018-10-04 DIAGNOSIS — G43.909 MIGRAINE WITHOUT STATUS MIGRAINOSUS, NOT INTRACTABLE, UNSPECIFIED MIGRAINE TYPE: ICD-10-CM

## 2018-10-04 RX ORDER — BUTALBITAL, ACETAMINOPHEN AND CAFFEINE 50; 325; 40 MG/1; MG/1; MG/1
TABLET ORAL
Qty: 60 TABLET | Refills: 1 | Status: SHIPPED | OUTPATIENT
Start: 2018-10-04 | End: 2020-04-27 | Stop reason: SDUPTHER

## 2018-10-05 ENCOUNTER — OFFICE VISIT (OUTPATIENT)
Dept: FAMILY MEDICINE CLINIC | Facility: CLINIC | Age: 43
End: 2018-10-05
Payer: COMMERCIAL

## 2018-10-05 VITALS
WEIGHT: 191 LBS | RESPIRATION RATE: 16 BRPM | BODY MASS INDEX: 33.84 KG/M2 | SYSTOLIC BLOOD PRESSURE: 94 MMHG | TEMPERATURE: 98.3 F | HEART RATE: 78 BPM | HEIGHT: 63 IN | DIASTOLIC BLOOD PRESSURE: 68 MMHG | OXYGEN SATURATION: 98 %

## 2018-10-05 DIAGNOSIS — R30.0 DYSURIA: Primary | ICD-10-CM

## 2018-10-05 DIAGNOSIS — F41.9 ANXIETY: ICD-10-CM

## 2018-10-05 DIAGNOSIS — G47.09 OTHER INSOMNIA: ICD-10-CM

## 2018-10-05 LAB
BACTERIA UR QL AUTO: ABNORMAL /HPF
BILIRUB UR QL STRIP: NEGATIVE
CLARITY UR: ABNORMAL
COLOR UR: ABNORMAL
GLUCOSE UR STRIP-MCNC: NEGATIVE MG/DL
HGB UR QL STRIP.AUTO: NEGATIVE
HYALINE CASTS #/AREA URNS LPF: ABNORMAL /LPF
KETONES UR STRIP-MCNC: NEGATIVE MG/DL
LEUKOCYTE ESTERASE UR QL STRIP: ABNORMAL
NITRITE UR QL STRIP: NEGATIVE
NON-SQ EPI CELLS URNS QL MICRO: ABNORMAL /HPF
PH UR STRIP.AUTO: 7 [PH] (ref 4.5–8)
PROT UR STRIP-MCNC: ABNORMAL MG/DL
RBC #/AREA URNS AUTO: ABNORMAL /HPF
SL AMB  POCT GLUCOSE, UA: NORMAL
SL AMB LEUKOCYTE ESTERASE,UA: ABNORMAL
SL AMB POCT BILIRUBIN,UA: NEGATIVE
SL AMB POCT BLOOD,UA: NEGATIVE
SL AMB POCT CLARITY,UA: CLEAR
SL AMB POCT COLOR,UA: YELLOW
SL AMB POCT KETONES,UA: NEGATIVE
SL AMB POCT NITRITE,UA: NEGATIVE
SL AMB POCT PH,UA: 7
SL AMB POCT SPECIFIC GRAVITY,UA: 1.01
SL AMB POCT URINE PROTEIN: NEGATIVE
SL AMB POCT UROBILINOGEN: NORMAL
SP GR UR STRIP.AUTO: 1.02 (ref 1–1.03)
UROBILINOGEN UR QL STRIP.AUTO: 0.2 E.U./DL
WBC #/AREA URNS AUTO: ABNORMAL /HPF

## 2018-10-05 PROCEDURE — 81001 URINALYSIS AUTO W/SCOPE: CPT | Performed by: FAMILY MEDICINE

## 2018-10-05 PROCEDURE — 99214 OFFICE O/P EST MOD 30 MIN: CPT | Performed by: FAMILY MEDICINE

## 2018-10-05 PROCEDURE — 81002 URINALYSIS NONAUTO W/O SCOPE: CPT | Performed by: FAMILY MEDICINE

## 2018-10-05 PROCEDURE — 87086 URINE CULTURE/COLONY COUNT: CPT | Performed by: FAMILY MEDICINE

## 2018-10-05 RX ORDER — ALPRAZOLAM 1 MG/1
1 TABLET ORAL 3 TIMES DAILY PRN
Qty: 90 TABLET | Refills: 1 | Status: SHIPPED | OUTPATIENT
Start: 2018-10-05 | End: 2018-12-06 | Stop reason: SDUPTHER

## 2018-10-05 RX ORDER — NITROFURANTOIN 25; 75 MG/1; MG/1
100 CAPSULE ORAL 2 TIMES DAILY
Qty: 10 CAPSULE | Refills: 0 | Status: SHIPPED | OUTPATIENT
Start: 2018-10-05 | End: 2018-11-19 | Stop reason: ALTCHOICE

## 2018-10-05 NOTE — PROGRESS NOTES
Assessment/Plan:     Diagnoses and all orders for this visit:    Anxiety  Comments:  Continue on  Effexor to 75 mg daily  It was discussed about BusPar and trazodone  She was given refills for Xanax 1 mg 3 times a day as needed  Orders:  -     ALPRAZolam (XANAX) 1 mg tablet; Take 1 tablet (1 mg total) by mouth 3 (three) times a day as needed for anxiety for up to 30 days    Dysuria  Comments:  I am going to send her urine for UA and culture  She was given prescription for Macrobid but was told not to take it other than her symptoms got worse  Orders:  -     nitrofurantoin (MACROBID) 100 mg capsule; Take 1 capsule (100 mg total) by mouth 2 (two) times a day  -     POCT urine dip    Other insomnia  Comments:  She was told to take Xanax at bedtime to help with sleeping  It was discussed about the trazodone  There are no Patient Instructions on file for this visit  Return in about 2 months (around 12/5/2018)  Subjective:      Patient ID: Claude Bloom is a 37 y o  female  Chief Complaint   Patient presents with    Anxiety     one month f/u    Urinary Tract Infection     pressure, frequency       She is here today for follow-up anxiety and complained of urinary symptoms  She was seen couple weeks ago was diagnosed with UTI and was given Bactrim  She stated her symptoms improved but she continues to feel a pressure in her lower abdomen  Urine dip today showed some leukocyte but everything else was normal   She does not have any fever, no chills, no frequency, no urgency  She is back on lower dose of Effexor and that seems to help her symptoms  She continues to have anxiety and she takes Xanax 1 mg 3 times a day as needed  She stated that help her with sleeping and help with her muscle tension          The following portions of the patient's history were reviewed and updated as appropriate: allergies, current medications, past family history, past medical history, past social history, past surgical history and problem list     Review of Systems   Constitutional: Negative for chills and fever  HENT: Negative for trouble swallowing  Eyes: Negative for visual disturbance  Respiratory: Negative for cough and shortness of breath  Cardiovascular: Negative for chest pain, palpitations and leg swelling  Gastrointestinal: Negative for abdominal pain, constipation and diarrhea  Endocrine: Negative for cold intolerance and heat intolerance  Genitourinary: Positive for dysuria  Negative for difficulty urinating, frequency, hematuria and urgency  Musculoskeletal: Negative for gait problem  Skin: Negative for rash  Neurological: Negative for dizziness, tremors, seizures and headaches  Hematological: Negative for adenopathy  Psychiatric/Behavioral: Negative for behavioral problems  The patient is nervous/anxious  Current Outpatient Prescriptions   Medication Sig Dispense Refill    ALPRAZolam (XANAX) 1 mg tablet Take 1 tablet (1 mg total) by mouth 3 (three) times a day as needed for anxiety for up to 30 days 90 tablet 1    butalbital-acetaminophen-caffeine (FIORICET,ESGIC) -40 mg per tablet TAKE 1 TABLET BY MOUTH 3 (THREE) TIMES A DAY AS NEEDED FOR MIGRAINE 60 tablet 1    cetirizine (ZyrTEC) 10 mg tablet Take 1 tablet (10 mg total) by mouth daily 30 tablet 3    ergocalciferol (VITAMIN D2) 50,000 units Take 1 capsule (50,000 Units total) by mouth once a week 4 capsule 5    Multiple Vitamins-Minerals (MULTIVITAMIN ADULT PO) Take by mouth daily      venlafaxine (EFFEXOR-XR) 75 mg 24 hr capsule Take 1 capsule (75 mg total) by mouth daily 30 capsule 3    nitrofurantoin (MACROBID) 100 mg capsule Take 1 capsule (100 mg total) by mouth 2 (two) times a day 10 capsule 0     No current facility-administered medications for this visit          Objective:    BP 94/68 (BP Location: Left arm, Patient Position: Sitting, Cuff Size: Adult)   Pulse 78   Temp 98 3 °F (36 8 °C) (Tympanic) Resp 16   Ht 5' 2 5" (1 588 m)   Wt 86 6 kg (191 lb)   SpO2 98%   BMI 34 38 kg/m²        Physical Exam   Constitutional: She is oriented to person, place, and time  She appears well-developed and well-nourished  HENT:   Head: Normocephalic and atraumatic  Eyes: Pupils are equal, round, and reactive to light  EOM are normal    Neck: Normal range of motion  Neck supple  Cardiovascular: Normal rate, regular rhythm and normal heart sounds  Pulmonary/Chest: Effort normal and breath sounds normal    Abdominal: Soft  Bowel sounds are normal    Musculoskeletal: Normal range of motion  She exhibits no edema  Lymphadenopathy:     She has no cervical adenopathy  Neurological: She is alert and oriented to person, place, and time  No cranial nerve deficit  Skin: Skin is warm  Psychiatric: She has a normal mood and affect  Nursing note and vitals reviewed               Regan Haji MD

## 2018-10-07 LAB — BACTERIA UR CULT: NORMAL

## 2018-10-08 ENCOUNTER — TELEPHONE (OUTPATIENT)
Dept: FAMILY MEDICINE CLINIC | Facility: CLINIC | Age: 43
End: 2018-10-08

## 2018-11-15 NOTE — PROGRESS NOTES
Patient ID: Yoly Castro is a 37 y o  female  Assessment/Plan:     Problem List Items Addressed This Visit        Cardiovascular and Mediastinum    Migraine with aura and without status migrainosus, not intractable       Other    Anxiety    Pressure in head - Primary    Relevant Orders    MRI brain with and without contrast    MRA and or MRV head wo contrast    New daily persistent headache    Relevant Orders    MRI brain with and without contrast    MRA and or MRV head wo contrast    Vitamin B12    BUN    Creatinine, serum    Restless leg syndrome    Insomnia         Menstrual migraines with aura: Will get MRI of the brain with and without contrast  Did inform patient that she is not to take any birth control pills  Patient not interested in taking anything on daily basis at this time for this  New daily persistent headache/ pressure-type sensation:    From history it seems as if she does have increased intracranial pressure as headaches are worse when patient lies down better when she is up  At times the headaches do not get better as the day progresses  But the pressure seems to be worse when lying down mostly  It is locked on the right side   -No other associated symptoms with that other than worse when patient bends over  -we will get MRA/ MRV to look for any vascular abnormality (vertebral dissection or venous thrombosis)  - did talk to patient about Topamax but she was not interested in at this time    Restless leg syndrome:  - Patient states that she takes Xanax at night to help her sleep intermittently  But was not interested in taking gabapentin as an alternative at this time  Insomnia:  - Patient states that she does not have difficulty falling asleep but does have difficulty maintaining her sleep  Once she is up she has difficulty falling back asleep    Did discuss with her that she could try using melatonin to see if that helps if she is interested I would start off with 3 mg increase up slowly to 12 mg as tolerated  - I would like the patient not to use Xanax as a sleep aid  May take these over-the-counter supplements to decrease intensity and frequency of migraines  - Magnesium Oxide 400-500 mg a day  If any diarrhea or upset stomach, decrease dose  as tolerated  -  B2 200 mg a day  May take once a day in am  This supplement will change the color of the urine to fluorescent yellow no matter how hydrated, which is normal       Headache management instructions  - When patient has a moderate to severe headache, they should seek rest, initiate relaxation and apply cold compresses to the head  - Maintain regular sleep schedule  Adults need at least 7-8 hours of uninterrupted a night  - Limit over the counter medications such as Tylenol, Ibuprofen, Aleve, Excedrin  (No more than 3 times a week)  - Maintain headache diary  We discussed an KEVAN for a smart phone is "Migraine kareen"  - Limit caffeine to 1-2 cups a day or less  - Avoid dietary trigger  (aged cheese, peanuts, MSG, aspartame and nitrates)  - Patient is to have regular frequent meals to prevent headache onset  - Please drink at least 64 ounces of water a day to help remain hydrated  Please call with any questions or concerns  Subjective:  HPI  We had the pleasure of evaluating Hamptonraven Bertrand in neurological consultation today  As you know,  she is a 37 y o  right handed female  She is an  and is here today for evaluation of her headaches  Restless leg syndrome:  Patient states that she often finds herself lying in bed with cramps in her legs which affect her sleep  Any family history of aneurysms - No  Family history of migraine headaches -   No         Insomnia:   - she is getting about 6 hours  Not hard time sleeping but difficulty staying a sleep or falling back a sleep       Headaches:   What is your current pain level -4-5/10    Headaches started at what age - she started having migraine headaches in her 42's with menstruation  But has a head pain on daily basis which started about a year ago in nov of 2017  Putting her head on the pillow will make it worse  How often do the headaches occur -   Pressure type sensation: on daily basis but at night time and in am  It gets worse when she lays down in the bed  Migraine headaches: 1-4 times a month    What time of the day do the headaches start -  Migraine: in am   Pressure type sensation: at bedtime    How long do the headaches last -   Migraine headaches: 1-5 hours  Pressure type sensation: until she falls a sleep and when she gets up in am  But once she is out of the bed she may have improvement at time  Stress cause it to last longer    Are you ever headache free -yes, more often it does clear up during the day    Where are they located - never had a headache on the left side  Migraine: right orbital, frontal, temporal  Pressure: starts off frontal and moves to the occipital region  It is locked on the right side  What is the intensity of pain -  Migraine headaches:  8-9/10  Pressure headaches: 5/10    Aura and how long does it last - flashing light, blurry vision, double vision, right side heaviness - this will last for an hour    Describe your usual headache -   Migraine headaches:  Throbbing, Pounding, stabbing  Pressure headaches: Pressure,  Nagging, Aching, Dull    Associated symptoms:   - Decrease of appetite, nausea, constipation  - Photophobia, phonophobia, sensitivity to smell   - Problem with concentration  - Blurred vision  - light-headed or dizzy, stiff or sore neck,   - Hands or feet tingle or feel numb, prefer to be alone and in a dark room, unable to work    Headache are worse if the patient: cough, sneeze, bending over will make pressure headaches worse    Headache trigger: Fatigue, Stress/Tension, Weather change, Menstruation, bright lights, lack of sleep,     Are you current pregnant or planning on getting pregnant? Done with family planning  What time of the year do headaches occur more frequently - n/a  Have you seen someone else for headaches or pain -PCP  Have you had trigger point injection performed and how often -  no  Have you had Botox injection performed and how often -  no  Have you had epidural injections or transforaminal injections performed - no    What medications do you take or have you taken for your headaches? PREVENTIVE:  Venlafaxine(mood), bupropion,  ABORTIVE:  Fioricet, Xanax,    Have you used  CBD or THC for your headaches and how often? None    How much caffeine to you consume per day? 3 oz of espresso    Non-Medical/Alternative Treatments used in the past for headaches:message    The following portions of the patient's history were reviewed and updated as appropriate: allergies, current medications, past family history, past medical history, past social history, past surgical history and problem list          Objective:    Blood pressure 100/68, height 5' 2 5" (1 588 m), weight 86 5 kg (190 lb 11 2 oz)  Physical Exam   Constitutional: She appears well-developed  Eyes: Pupils are equal, round, and reactive to light  EOM are normal    Neck: Normal range of motion  Neck supple  Cardiovascular: Normal rate  Pulmonary/Chest: Effort normal    Abdominal: Soft  Musculoskeletal: Normal range of motion  Neurological: She has normal strength and normal reflexes  Skin: Skin is warm  Psychiatric: She has a normal mood and affect  Her speech is normal        Neurological Exam  Mental Status   Speech is normal  Language is fluent with no aphasia  Attention and concentration are normal     Cranial Nerves  CN II: Visual fields full to confrontation  CN III, IV, VI: Extraocular movements intact bilaterally  Pupils equal round and reactive to light bilaterally  CN V: Facial sensation is normal   CN VII: Full and symmetric facial movement    CN VIII: Hearing is normal   CN IX, X: Palate elevates symmetrically  Normal gag reflex  CN XI: Shoulder shrug strength is normal   CN XII: Tongue midline without atrophy or fasciculations  Motor   Strength is 5/5 throughout all four extremities  Sensory  Sensation is intact to light touch, pinprick, vibration and proprioception in all four extremities  Reflexes  Deep tendon reflexes are 2+ and symmetric in all four extremities with downgoing toes bilaterally  Coordination  Right: Finger-to-nose normal   Left: Finger-to-nose normal     Gait  Casual gait is normal including stance, stride, and arm swing  ROS:    Review of Systems   Constitutional: Positive for fatigue  HENT: Negative  Eyes: Positive for pain  Blurred Vision    Respiratory: Negative  Cardiovascular: Negative  Gastrointestinal: Positive for nausea  Endocrine: Negative  Genitourinary: Negative  Musculoskeletal: Positive for back pain, myalgias, neck pain and neck stiffness  Skin: Negative  Allergic/Immunologic: Negative  Neurological: Positive for dizziness, light-headedness, numbness and headaches  Hematological: Negative  Psychiatric/Behavioral: Positive for sleep disturbance (Trouble falling and staying asleep )  The patient is nervous/anxious

## 2018-11-19 ENCOUNTER — OFFICE VISIT (OUTPATIENT)
Dept: NEUROLOGY | Facility: CLINIC | Age: 43
End: 2018-11-19
Payer: COMMERCIAL

## 2018-11-19 VITALS
BODY MASS INDEX: 33.79 KG/M2 | HEIGHT: 63 IN | DIASTOLIC BLOOD PRESSURE: 68 MMHG | WEIGHT: 190.7 LBS | SYSTOLIC BLOOD PRESSURE: 100 MMHG

## 2018-11-19 DIAGNOSIS — G44.52 NEW DAILY PERSISTENT HEADACHE: ICD-10-CM

## 2018-11-19 DIAGNOSIS — G25.81 RESTLESS LEG SYNDROME: ICD-10-CM

## 2018-11-19 DIAGNOSIS — G47.00 INSOMNIA, UNSPECIFIED TYPE: ICD-10-CM

## 2018-11-19 DIAGNOSIS — G43.909 MIGRAINE WITHOUT STATUS MIGRAINOSUS, NOT INTRACTABLE, UNSPECIFIED MIGRAINE TYPE: ICD-10-CM

## 2018-11-19 DIAGNOSIS — F41.9 ANXIETY: ICD-10-CM

## 2018-11-19 DIAGNOSIS — R51.9 PRESSURE IN HEAD: Primary | ICD-10-CM

## 2018-11-19 PROCEDURE — 99204 OFFICE O/P NEW MOD 45 MIN: CPT | Performed by: PSYCHIATRY & NEUROLOGY

## 2018-11-19 NOTE — PATIENT INSTRUCTIONS
Menstrual migraines with aura: Will get MRI of the brain with and without contrast  Did inform patient that she is not to take any birth control pills  Patient not interested in taking anything on daily basis at this time for this  New daily persistent headache/ pressure-type sensation:    From history it seems as if she does have increased intracranial pressure as headaches are worse when patient lies down better when she is up  At times the headaches do not get better as the day progresses  But the pressure seems to be worse when lying down mostly  It is locked on the right side   -No other associated symptoms with that other than worse when patient bends over  -we will get MRA/ MRV to look for any vascular abnormality (vertebral dissection or venous thrombosis)  - I did talk to patient but starting Topamax but she was not interested in that either  Restless leg syndrome:  - Patient states that she takes Xanax at night to help her sleep intermittently  But was not interested in taking gabapentin as an alternative at this time  Insomnia:  - Patient states that she does not have difficulty falling asleep but does have difficulty maintaining her sleep  Once she is up she has difficulty falling back asleep  Did discuss with her that she could try using melatonin to see if that helps if she is interested I would start off with 3 mg increase up slowly to 12 mg as tolerated  - I would like the patient not to use Xanax as a sleep aid     - Young patient who are still in a reproductive age, should take folic acid daily when taking anti-seiuzre drugs especially Depakote  May take these over-the-counter supplements to decrease intensity and frequency of migraines  - Magnesium Oxide 400-500 mg a day  If any diarrhea or upset stomach, decrease dose  as tolerated  -  B2 200 mg a day    May take once a day in am  This supplement will change the color of the urine to fluorescent yellow no matter how hydrated, which is normal       Headache management instructions  - When patient has a moderate to severe headache, they should seek rest, initiate relaxation and apply cold compresses to the head  - Maintain regular sleep schedule  Adults need at least 7-8 hours of uninterrupted a night  - Limit over the counter medications such as Tylenol, Ibuprofen, Aleve, Excedrin  (No more than 3 times a week)  - Maintain headache diary  We discussed an KEVAN for a smart phone is "Migraine kareen"  - Limit caffeine to 1-2 cups a day or less  - Avoid dietary trigger  (aged cheese, peanuts, MSG, aspartame and nitrates)  - Patient is to have regular frequent meals to prevent headache onset  - Please drink at least 64 ounces of water a day to help remain hydrated  Please call with any questions or concerns

## 2018-11-29 ENCOUNTER — APPOINTMENT (OUTPATIENT)
Dept: LAB | Facility: IMAGING CENTER | Age: 43
End: 2018-11-29
Payer: COMMERCIAL

## 2018-11-29 ENCOUNTER — TRANSCRIBE ORDERS (OUTPATIENT)
Dept: ADMINISTRATIVE | Facility: HOSPITAL | Age: 43
End: 2018-11-29

## 2018-11-29 DIAGNOSIS — G44.52 NEW DAILY PERSISTENT HEADACHE: ICD-10-CM

## 2018-11-29 LAB
BUN SERPL-MCNC: 13 MG/DL (ref 5–25)
CREAT SERPL-MCNC: 0.55 MG/DL (ref 0.6–1.3)
GFR SERPL CREATININE-BSD FRML MDRD: 115 ML/MIN/1.73SQ M
VIT B12 SERPL-MCNC: 744 PG/ML (ref 100–900)

## 2018-11-29 PROCEDURE — 84520 ASSAY OF UREA NITROGEN: CPT

## 2018-11-29 PROCEDURE — 36415 COLL VENOUS BLD VENIPUNCTURE: CPT

## 2018-11-29 PROCEDURE — 82565 ASSAY OF CREATININE: CPT

## 2018-11-29 PROCEDURE — 82607 VITAMIN B-12: CPT

## 2018-12-04 ENCOUNTER — HOSPITAL ENCOUNTER (OUTPATIENT)
Dept: RADIOLOGY | Facility: IMAGING CENTER | Age: 43
Discharge: HOME | End: 2018-12-04
Payer: COMMERCIAL

## 2018-12-04 DIAGNOSIS — R51.9 PRESSURE IN HEAD: ICD-10-CM

## 2018-12-04 DIAGNOSIS — G44.52 NEW DAILY PERSISTENT HEADACHE: ICD-10-CM

## 2018-12-04 DIAGNOSIS — G43.909 MIGRAINE WITHOUT STATUS MIGRAINOSUS, NOT INTRACTABLE, UNSPECIFIED MIGRAINE TYPE: ICD-10-CM

## 2018-12-04 PROCEDURE — 70553 MRI BRAIN STEM W/O & W/DYE: CPT

## 2018-12-04 PROCEDURE — 70544 MR ANGIOGRAPHY HEAD W/O DYE: CPT

## 2018-12-04 PROCEDURE — A9585 GADOBUTROL INJECTION: HCPCS | Performed by: PSYCHIATRY & NEUROLOGY

## 2018-12-04 RX ADMIN — GADOBUTROL 8 ML: 604.72 INJECTION INTRAVENOUS at 11:45

## 2018-12-06 ENCOUNTER — OFFICE VISIT (OUTPATIENT)
Dept: FAMILY MEDICINE CLINIC | Facility: CLINIC | Age: 43
End: 2018-12-06
Payer: COMMERCIAL

## 2018-12-06 VITALS
WEIGHT: 191.6 LBS | HEART RATE: 90 BPM | TEMPERATURE: 98.8 F | BODY MASS INDEX: 33.95 KG/M2 | HEIGHT: 63 IN | DIASTOLIC BLOOD PRESSURE: 78 MMHG | SYSTOLIC BLOOD PRESSURE: 130 MMHG | RESPIRATION RATE: 16 BRPM | OXYGEN SATURATION: 96 %

## 2018-12-06 DIAGNOSIS — Z00.00 HEALTHCARE MAINTENANCE: Primary | ICD-10-CM

## 2018-12-06 DIAGNOSIS — Z23 IMMUNIZATION DUE: ICD-10-CM

## 2018-12-06 DIAGNOSIS — F41.9 ANXIETY: ICD-10-CM

## 2018-12-06 DIAGNOSIS — G43.909 MIGRAINE WITHOUT STATUS MIGRAINOSUS, NOT INTRACTABLE, UNSPECIFIED MIGRAINE TYPE: ICD-10-CM

## 2018-12-06 PROCEDURE — 99396 PREV VISIT EST AGE 40-64: CPT | Performed by: FAMILY MEDICINE

## 2018-12-06 RX ORDER — VENLAFAXINE HYDROCHLORIDE 75 MG/1
75 CAPSULE, EXTENDED RELEASE ORAL DAILY
Qty: 30 CAPSULE | Refills: 3 | Status: SHIPPED | OUTPATIENT
Start: 2018-12-06 | End: 2019-04-02 | Stop reason: SDUPTHER

## 2018-12-06 RX ORDER — BUTALBITAL, ACETAMINOPHEN AND CAFFEINE 50; 325; 40 MG/1; MG/1; MG/1
TABLET ORAL
Qty: 60 TABLET | Refills: 1 | OUTPATIENT
Start: 2018-12-06

## 2018-12-06 RX ORDER — ALPRAZOLAM 1 MG/1
1 TABLET ORAL 3 TIMES DAILY PRN
Qty: 90 TABLET | Refills: 0 | Status: SHIPPED | OUTPATIENT
Start: 2018-12-06 | End: 2019-08-26 | Stop reason: SDUPTHER

## 2018-12-06 RX ORDER — ELECTROLYTES/DEXTROSE
1 SOLUTION, ORAL ORAL DAILY
Qty: 90 TABLET | Refills: 1 | Status: SHIPPED | OUTPATIENT
Start: 2018-12-06 | End: 2019-05-02 | Stop reason: SDUPTHER

## 2018-12-06 NOTE — PROGRESS NOTES
Assessment/Plan:     Diagnoses and all orders for this visit:    Healthcare maintenance  Comments: It was discussed about immunizations, diet, exercise and safety measures  Orders:  -     Multiple Vitamins-Minerals (MULTIVITAMIN ADULT) TABS; Take 1 tablet by mouth daily    Anxiety  Comments:  Stable  Continue same  Will continue to monitor  Orders:  -     ALPRAZolam (XANAX) 1 mg tablet; Take 1 tablet (1 mg total) by mouth 3 (three) times a day as needed for anxiety for up to 30 days  -     venlafaxine (EFFEXOR-XR) 75 mg 24 hr capsule; Take 1 capsule (75 mg total) by mouth daily    Immunization due  -     SYRINGE/SINGLE-DOSE VIAL: influenza vaccine, 2589-8051, quadrivalent, 0 5 mL, preservative-free, for patients 3+ yr (FLUZONE)          There are no Patient Instructions on file for this visit  Return in about 3 months (around 3/6/2019)  Subjective:      Patient ID: Orquidea Ca is a 37 y o  female  Chief Complaint   Patient presents with    Follow-up     anxiety       She is here today for physical   She has been taking all her medications  She stated her anxiety improving but she continues to take all her medications for anxiety  She has been having problems with migraine headache and she is following with neurologist   She had MRI and MRA ordered recently and they came back within normal limit  She has a follow-up appointment with the neurologist to discuss the medical management for her migraine headache  The following portions of the patient's history were reviewed and updated as appropriate: allergies, current medications, past family history, past medical history, past social history, past surgical history and problem list     Review of Systems   Constitutional: Negative for chills and fever  HENT: Negative for trouble swallowing  Eyes: Negative for visual disturbance  Respiratory: Negative for cough and shortness of breath      Cardiovascular: Negative for chest pain, palpitations and leg swelling  Gastrointestinal: Negative for abdominal pain, constipation and diarrhea  Endocrine: Negative for cold intolerance and heat intolerance  Genitourinary: Negative for difficulty urinating and dysuria  Musculoskeletal: Negative for gait problem  Skin: Negative for rash  Neurological: Positive for headaches  Negative for dizziness, tremors and seizures  Hematological: Negative for adenopathy  Psychiatric/Behavioral: Negative for behavioral problems  The patient is nervous/anxious  Current Outpatient Prescriptions   Medication Sig Dispense Refill    butalbital-acetaminophen-caffeine (FIORICET,ESGIC) -40 mg per tablet TAKE 1 TABLET BY MOUTH 3 (THREE) TIMES A DAY AS NEEDED FOR MIGRAINE 60 tablet 1    cetirizine (ZyrTEC) 10 mg tablet Take 1 tablet (10 mg total) by mouth daily 30 tablet 3    ergocalciferol (VITAMIN D2) 50,000 units Take 1 capsule (50,000 Units total) by mouth once a week 4 capsule 5    Multiple Vitamins-Minerals (MULTIVITAMIN ADULT) TABS Take 1 tablet by mouth daily 90 tablet 1    venlafaxine (EFFEXOR-XR) 75 mg 24 hr capsule Take 1 capsule (75 mg total) by mouth daily 30 capsule 3    ALPRAZolam (XANAX) 1 mg tablet Take 1 tablet (1 mg total) by mouth 3 (three) times a day as needed for anxiety for up to 30 days 90 tablet 0     No current facility-administered medications for this visit  Objective:    /78 (BP Location: Left arm, Patient Position: Sitting, Cuff Size: Adult)   Pulse 90   Temp 98 8 °F (37 1 °C) (Tympanic)   Resp 16   Ht 5' 2 5" (1 588 m)   Wt 86 9 kg (191 lb 9 6 oz)   SpO2 96%   BMI 34 49 kg/m²        Physical Exam   Constitutional: She is oriented to person, place, and time  She appears well-developed and well-nourished  HENT:   Head: Normocephalic and atraumatic  Eyes: Pupils are equal, round, and reactive to light  EOM are normal    Neck: Normal range of motion  Neck supple     Cardiovascular: Normal rate, regular rhythm and normal heart sounds  Pulmonary/Chest: Effort normal and breath sounds normal    Abdominal: Soft  Bowel sounds are normal    Musculoskeletal: Normal range of motion  She exhibits no edema  Lymphadenopathy:     She has no cervical adenopathy  Neurological: She is alert and oriented to person, place, and time  No cranial nerve deficit  Skin: Skin is warm  Psychiatric: She has a normal mood and affect  Nursing note and vitals reviewed               Heydi Mccartney MD

## 2019-02-18 DIAGNOSIS — F41.9 ANXIETY: ICD-10-CM

## 2019-02-18 RX ORDER — ALPRAZOLAM 1 MG/1
TABLET ORAL
Qty: 90 TABLET | Refills: 0 | Status: SHIPPED | OUTPATIENT
Start: 2019-02-18 | End: 2019-05-21

## 2019-03-02 DIAGNOSIS — E55.9 VITAMIN D INSUFFICIENCY: ICD-10-CM

## 2019-03-02 DIAGNOSIS — F41.9 ANXIETY: ICD-10-CM

## 2019-03-02 DIAGNOSIS — G43.909 MIGRAINE WITHOUT STATUS MIGRAINOSUS, NOT INTRACTABLE, UNSPECIFIED MIGRAINE TYPE: ICD-10-CM

## 2019-03-04 RX ORDER — BUTALBITAL, ACETAMINOPHEN AND CAFFEINE 50; 325; 40 MG/1; MG/1; MG/1
TABLET ORAL
Qty: 60 TABLET | Refills: 0 | OUTPATIENT
Start: 2019-03-04

## 2019-03-04 RX ORDER — BUTALBITAL, ACETAMINOPHEN AND CAFFEINE 50; 325; 40 MG/1; MG/1; MG/1
TABLET ORAL
Qty: 60 TABLET | Refills: 0 | Status: SHIPPED | OUTPATIENT
Start: 2019-03-04 | End: 2019-04-06 | Stop reason: SDUPTHER

## 2019-03-04 RX ORDER — ERGOCALCIFEROL 1.25 MG/1
CAPSULE ORAL
Qty: 4 CAPSULE | Refills: 4 | Status: SHIPPED | OUTPATIENT
Start: 2019-03-04 | End: 2019-09-05 | Stop reason: ALTCHOICE

## 2019-03-04 RX ORDER — ALPRAZOLAM 1 MG/1
1 TABLET ORAL 3 TIMES DAILY PRN
Qty: 90 TABLET | Refills: 0 | Status: SHIPPED | OUTPATIENT
Start: 2019-03-04 | End: 2019-05-21

## 2019-03-05 ENCOUNTER — OFFICE VISIT (OUTPATIENT)
Dept: URGENT CARE | Facility: CLINIC | Age: 44
End: 2019-03-05
Payer: COMMERCIAL

## 2019-03-05 VITALS
OXYGEN SATURATION: 98 % | BODY MASS INDEX: 35.15 KG/M2 | TEMPERATURE: 98.7 F | HEIGHT: 62 IN | SYSTOLIC BLOOD PRESSURE: 126 MMHG | RESPIRATION RATE: 18 BRPM | HEART RATE: 80 BPM | DIASTOLIC BLOOD PRESSURE: 65 MMHG | WEIGHT: 191 LBS

## 2019-03-05 DIAGNOSIS — R05.9 COUGH: ICD-10-CM

## 2019-03-05 DIAGNOSIS — J02.9 SORE THROAT: Primary | ICD-10-CM

## 2019-03-05 LAB — S PYO AG THROAT QL: NEGATIVE

## 2019-03-05 PROCEDURE — 87430 STREP A AG IA: CPT | Performed by: NURSE PRACTITIONER

## 2019-03-05 PROCEDURE — 99203 OFFICE O/P NEW LOW 30 MIN: CPT | Performed by: NURSE PRACTITIONER

## 2019-03-05 PROCEDURE — 87070 CULTURE OTHR SPECIMN AEROBIC: CPT | Performed by: NURSE PRACTITIONER

## 2019-03-05 RX ORDER — ALBUTEROL SULFATE 90 UG/1
2 AEROSOL, METERED RESPIRATORY (INHALATION) EVERY 4 HOURS PRN
Qty: 18 G | Refills: 0 | Status: SHIPPED | OUTPATIENT
Start: 2019-03-05 | End: 2019-05-21

## 2019-03-05 RX ORDER — GUAIFENESIN 600 MG
1200 TABLET, EXTENDED RELEASE 12 HR ORAL EVERY 12 HOURS SCHEDULED
Qty: 20 TABLET | Refills: 0 | Status: SHIPPED | OUTPATIENT
Start: 2019-03-05 | End: 2019-05-21

## 2019-03-05 NOTE — PATIENT INSTRUCTIONS
Albuterol 2 puffs every 4 hours as needed for cough   Mucinex 2 tabs every 12 hours as needed   Tylenol/Motrin as needed for pain   Increase fluid intake   Throat lozenges, honey, salt water gargles for discomfort   Follow up with your PCP for worsening symptoms     Upper Respiratory Infection   WHAT YOU NEED TO KNOW:   An upper respiratory infection is also called the common cold  It is an infection that can affect your nose, throat, ears, and sinuses  For healthy people, the common cold is usually not serious and does not need special treatment  Cold symptoms are usually worst for the first 3 to 5 days  Most people get better in 7 to 14 days  You may continue to cough for 2 to 3 weeks  Colds are caused by viruses and do not get better with antibiotics  DISCHARGE INSTRUCTIONS:   Return to the emergency department if:   · You have chest pain or trouble breathing  Contact your healthcare provider if:   · You have a fever over 102ºF (39°C)  · Your sore throat gets worse or you see white or yellow spots in your throat  · Your symptoms get worse after 3 to 5 days or your cold is not better in 14 days  · You have a rash anywhere on your skin  · You have large, tender lumps in your neck  · You have thick, green or yellow drainage from your nose  · You cough up thick yellow, green, or bloody mucus  · You have vomiting for more than 24 hours and cannot keep fluids down  · You have a bad earache  · You have questions or concerns about your condition or care  Medicines: You may need any of the following:  · Decongestants  help reduce nasal congestion and help you breathe more easily  If you take decongestant pills, they may make you feel restless or cause problems with your sleep  Do not use decongestant sprays for more than a few days  · Cough suppressants  help reduce coughing  Ask your healthcare provider which type of cough medicine is best for you       · NSAIDs , such as ibuprofen, help decrease swelling, pain, and fever  NSAIDs can cause stomach bleeding or kidney problems in certain people  If you take blood thinner medicine, always ask your healthcare provider if NSAIDs are safe for you  Always read the medicine label and follow directions  · Acetaminophen  decreases pain and fever  It is available without a doctor's order  Ask how much to take and how often to take it  Follow directions  Read the labels of all other medicines you are using to see if they also contain acetaminophen, or ask your doctor or pharmacist  Acetaminophen can cause liver damage if not taken correctly  Do not use more than 4 grams (4,000 milligrams) total of acetaminophen in one day  · Take your medicine as directed  Contact your healthcare provider if you think your medicine is not helping or if you have side effects  Tell him or her if you are allergic to any medicine  Keep a list of the medicines, vitamins, and herbs you take  Include the amounts, and when and why you take them  Bring the list or the pill bottles to follow-up visits  Carry your medicine list with you in case of an emergency  Follow up with your healthcare provider as directed:  Write down your questions so you remember to ask them during your visits  Self-care:   · Rest as much as possible  Slowly start to do more each day  · Drink more liquids as directed  Liquids will help thin and loosen mucus so you can cough it up  Liquids will also help prevent dehydration  Liquids that help prevent dehydration include water, fruit juice, and broth  Do not drink liquids that contain caffeine  Caffeine can increase your risk for dehydration  Ask your healthcare provider how much liquid to drink each day  · Soothe a sore throat  Gargle with warm salt water  This helps your sore throat feel better  Make salt water by dissolving ¼ teaspoon salt in 1 cup warm water  You may also suck on hard candy or throat lozenges   You may use a sore throat spray  · Use a humidifier or vaporizer  Use a cool mist humidifier or a vaporizer to increase air moisture in your home  This may make it easier for you to breathe and help decrease your cough  · Use saline nasal drops as directed  These help relieve congestion  · Apply petroleum-based jelly around the outside of your nostrils  This can decrease irritation from blowing your nose  · Do not smoke  Nicotine and other chemicals in cigarettes and cigars can make your symptoms worse  They can also cause infections such as bronchitis or pneumonia  Ask your healthcare provider for information if you currently smoke and need help to quit  E-cigarettes or smokeless tobacco still contain nicotine  Talk to your healthcare provider before you use these products  Prevent spreading your cold to others:   · Try to stay away from other people during the first 2 to 3 days of your cold when it is more easily spread  · Do not share food or drinks  · Do not share hand towels with household members  · Wash your hands often, especially after you blow your nose  Turn away from other people and cover your mouth and nose with a tissue when you sneeze or cough  © 2017 [facility] Information is for End User's use only and may not be sold, redistributed or otherwise used for commercial purposes  All illustrations and images included in CareNotes® are the copyrighted property of A D [employer] M [employer] Inc  or Edvin De Jesus  The above information is an  only  It is not intended as medical advice for individual conditions or treatments  Talk to your doctor, nurse or pharmacist before following any medical regimen to see if it is safe and effective for you

## 2019-03-05 NOTE — PROGRESS NOTES
[address] [address] [facility]        NAME: Essie Oscar is a 40 y o  female  : 1975    MRN: [id number]  DATE: 2019  TIME: 3:25 PM    Assessment and Plan   Sore throat [J02 9]  1  Sore throat  POCT rapid strepA    Throat culture   2  Cough  albuterol (VENTOLIN HFA) 90 mcg/act inhaler    guaiFENesin (MUCINEX) 600 mg 12 hr tablet       Throat swab sent for culture  Patient Instructions   Albuterol 2 puffs every 4 hours as needed for cough   Mucinex 2 tabs every 12 hours as needed   Tylenol/Motrin as needed for pain   Increase fluid intake   Throat lozenges, honey, salt water gargles for discomfort   Follow up with your PCP for worsening symptoms     Follow up with PCP in 3-5 days  Proceed to  ER if symptoms worsen  Chief Complaint     Chief Complaint   Patient presents with    Cough     x 4 days, productive cough, chest congestion          History of Present Illness       Patient is a 72-year-old female presenting with a cough that started on Friday  The cough is wet and productive with white sputum  She reports she just returned from [facility] today where she believes cigarette smoke exacerbated her symptoms  Associated symptoms include hoarse voice, rhinorrhea, sweats and chills, ear pain and sore throat  The fevers are subjective as she did not have a thermometer while traveling  She lost her voice over the weekend but it has since returned  She has been taking Mucinex and Tylenol multi symptom cold relief with improvement  Review of Systems   Review of Systems   Constitutional: Positive for chills, fatigue and fever  HENT: Positive for ear pain, rhinorrhea, sore throat and voice change  Respiratory: Positive for cough and shortness of breath  Gastrointestinal: Negative for abdominal pain, diarrhea, nausea and vomiting  Skin: Negative for rash  Neurological: Positive for headaches  Negative for weakness and numbness           Current Medications       Current Outpatient Medications:     ALPRAZolam (XANAX) 1 mg tablet, TAKE 1 TABLET (1 MG TOTAL) BY MOUTH 3 (THREE) TIMES A DAY AS NEEDED FOR ANXIETY, Disp: 90 tablet, Rfl: 0    butalbital-acetaminophen-caffeine (FIORICET,ESGIC) -40 mg per tablet, TAKE 1 TABLET BY MOUTH 3 (THREE) TIMES A DAY AS NEEDED FOR MIGRAINE, Disp: 60 tablet, Rfl: 0    cetirizine (ZyrTEC) 10 mg tablet, Take 1 tablet (10 mg total) by mouth daily, Disp: 30 tablet, Rfl: 3    ergocalciferol (VITAMIN D2) 50,000 units, TAKE 1 CAPSULE BY MOUTH ONCE A WEEK, Disp: 4 capsule, Rfl: 4    Multiple Vitamins-Minerals (MULTIVITAMIN ADULT) TABS, Take 1 tablet by mouth daily, Disp: 90 tablet, Rfl: 1    venlafaxine (EFFEXOR-XR) 75 mg 24 hr capsule, Take 1 capsule (75 mg total) by mouth daily, Disp: 30 capsule, Rfl: 3    albuterol (VENTOLIN HFA) 90 mcg/act inhaler, Inhale 2 puffs every 4 (four) hours as needed for wheezing, Disp: 18 g, Rfl: 0    ALPRAZolam (XANAX) 1 mg tablet, Take 1 tablet (1 mg total) by mouth 3 (three) times a day as needed for anxiety for up to 30 days, Disp: 90 tablet, Rfl: 0    ALPRAZolam (XANAX) 1 mg tablet, TAKE 1 TABLET (1 MG TOTAL) BY MOUTH 3 (THREE) TIMES A DAY AS NEEDED FOR ANXIETY FOR UP TO 30 DAYS (Patient not taking: Reported on 3/5/2019), Disp: 90 tablet, Rfl: 0    butalbital-acetaminophen-caffeine (FIORICET,ESGIC) -40 mg per tablet, TAKE 1 TABLET BY MOUTH 3 (THREE) TIMES A DAY AS NEEDED FOR MIGRAINE, Disp: 60 tablet, Rfl: 1    guaiFENesin (MUCINEX) 600 mg 12 hr tablet, Take 2 tablets (1,200 mg total) by mouth every 12 (twelve) hours, Disp: 20 tablet, Rfl: 0    Current Allergies     Allergies as of 03/05/2019 - Reviewed 03/05/2019   Allergen Reaction Noted    No active allergies  06/19/2017            The following portions of the patient's history were reviewed and updated as appropriate: allergies, current medications, past family history, past medical history, past social history, past surgical history and problem list  Past Medical History:   Diagnosis Date    Anxiety     Migraine        Past Surgical History:   Procedure Laterality Date    APPENDECTOMY  2008    VARICOSE VEIN SURGERY Left 2012       Family History   Problem Relation Age of Onset    No Known Problems Mother     No Known Problems Father          Medications have been verified  Objective   /65 (BP Location: Right arm, Patient Position: Sitting, Cuff Size: Standard)   Pulse 80   Temp 98 7 °F (37 1 °C) (Temporal)   Resp 18   Ht 5' 2" (1 575 m)   Wt 86 6 kg (191 lb)   LMP 03/05/2019   SpO2 98%   BMI 34 93 kg/m²      Rapid strep: Negative   Physical Exam     Physical Exam   Constitutional: She is oriented to person, place, and time  Vital signs are normal  She appears well-developed and well-nourished  She is active  No distress  HENT:   Head: Normocephalic  Right Ear: Hearing, tympanic membrane, external ear and ear canal normal    Left Ear: Hearing, tympanic membrane, external ear and ear canal normal    Nose: Nose normal    Mouth/Throat: Oropharyngeal exudate (1 small area on right ) and posterior oropharyngeal erythema present  No posterior oropharyngeal edema  Eyes: Pupils are equal, round, and reactive to light  Conjunctivae are normal    Cardiovascular: Normal rate, regular rhythm, S1 normal, S2 normal and normal heart sounds  No murmur heard  Pulmonary/Chest: No respiratory distress  She has no decreased breath sounds  She has wheezes (scattered fine )  She has no rhonchi  She has no rales  Neurological: She is alert and oriented to person, place, and time  GCS eye subscore is 4  GCS verbal subscore is 5  GCS motor subscore is 6  Skin: Skin is warm and dry

## 2019-03-07 LAB — BACTERIA THROAT CULT: NORMAL

## 2019-04-02 DIAGNOSIS — F41.9 ANXIETY: ICD-10-CM

## 2019-04-03 RX ORDER — VENLAFAXINE HYDROCHLORIDE 75 MG/1
75 CAPSULE, EXTENDED RELEASE ORAL DAILY
Qty: 30 CAPSULE | Refills: 2 | Status: SHIPPED | OUTPATIENT
Start: 2019-04-03 | End: 2019-05-21

## 2019-04-06 DIAGNOSIS — G43.909 MIGRAINE WITHOUT STATUS MIGRAINOSUS, NOT INTRACTABLE, UNSPECIFIED MIGRAINE TYPE: ICD-10-CM

## 2019-04-07 RX ORDER — BUTALBITAL, ACETAMINOPHEN AND CAFFEINE 50; 325; 40 MG/1; MG/1; MG/1
TABLET ORAL
Qty: 60 TABLET | Refills: 0 | Status: SHIPPED | OUTPATIENT
Start: 2019-04-07 | End: 2019-05-21

## 2019-04-24 ENCOUNTER — OFFICE VISIT (OUTPATIENT)
Dept: OBGYN CLINIC | Facility: CLINIC | Age: 44
End: 2019-04-24
Payer: COMMERCIAL

## 2019-04-24 VITALS
DIASTOLIC BLOOD PRESSURE: 70 MMHG | BODY MASS INDEX: 32.07 KG/M2 | HEIGHT: 63 IN | SYSTOLIC BLOOD PRESSURE: 100 MMHG | WEIGHT: 181 LBS

## 2019-04-24 DIAGNOSIS — Z11.51 SPECIAL SCREENING EXAMINATION FOR HUMAN PAPILLOMAVIRUS (HPV): ICD-10-CM

## 2019-04-24 DIAGNOSIS — Z30.2 REQUEST FOR STERILIZATION: ICD-10-CM

## 2019-04-24 DIAGNOSIS — Z01.419 SMEAR, VAGINAL, AS PART OF ROUTINE GYNECOLOGICAL EXAMINATION: ICD-10-CM

## 2019-04-24 DIAGNOSIS — Z12.39 SCREENING FOR BREAST CANCER: Primary | ICD-10-CM

## 2019-04-24 DIAGNOSIS — N81.6 RECTOCELE: ICD-10-CM

## 2019-04-24 DIAGNOSIS — Z12.72 SMEAR, VAGINAL, AS PART OF ROUTINE GYNECOLOGICAL EXAMINATION: ICD-10-CM

## 2019-04-24 DIAGNOSIS — Z01.419 ENCOUNTER FOR ANNUAL ROUTINE GYNECOLOGICAL EXAMINATION: ICD-10-CM

## 2019-04-24 PROCEDURE — G0145 SCR C/V CYTO,THINLAYER,RESCR: HCPCS | Performed by: OBSTETRICS & GYNECOLOGY

## 2019-04-24 PROCEDURE — 87624 HPV HI-RISK TYP POOLED RSLT: CPT | Performed by: OBSTETRICS & GYNECOLOGY

## 2019-04-24 PROCEDURE — 99386 PREV VISIT NEW AGE 40-64: CPT | Performed by: OBSTETRICS & GYNECOLOGY

## 2019-04-25 ENCOUNTER — PREP FOR PROCEDURE (OUTPATIENT)
Dept: OBGYN CLINIC | Facility: CLINIC | Age: 44
End: 2019-04-25

## 2019-04-25 DIAGNOSIS — R19.8 RECTAL PRESSURE: ICD-10-CM

## 2019-04-25 DIAGNOSIS — N81.6: Primary | ICD-10-CM

## 2019-04-25 DIAGNOSIS — Z30.2 ENCOUNTER FOR STERILIZATION: ICD-10-CM

## 2019-04-26 LAB
HPV HR 12 DNA CVX QL NAA+PROBE: NEGATIVE
HPV16 DNA CVX QL NAA+PROBE: NEGATIVE
HPV18 DNA CVX QL NAA+PROBE: NEGATIVE

## 2019-04-30 LAB
LAB AP GYN PRIMARY INTERPRETATION: NORMAL
Lab: NORMAL

## 2019-05-02 DIAGNOSIS — Z00.00 HEALTHCARE MAINTENANCE: ICD-10-CM

## 2019-05-02 DIAGNOSIS — Z88.9 H/O SEASONAL ALLERGIES: ICD-10-CM

## 2019-05-03 RX ORDER — DIPHENOXYLATE HYDROCHLORIDE AND ATROPINE SULFATE 2.5; .025 MG/1; MG/1
TABLET ORAL
Qty: 90 TABLET | Refills: 4 | Status: SHIPPED | OUTPATIENT
Start: 2019-05-03 | End: 2019-05-21

## 2019-05-04 RX ORDER — CETIRIZINE HYDROCHLORIDE 10 MG/1
10 TABLET ORAL DAILY
Qty: 30 TABLET | Refills: 2 | Status: SHIPPED | OUTPATIENT
Start: 2019-05-04 | End: 2019-09-05 | Stop reason: SDUPTHER

## 2019-05-22 ENCOUNTER — OFFICE VISIT (OUTPATIENT)
Dept: OBGYN CLINIC | Facility: CLINIC | Age: 44
End: 2019-05-22

## 2019-05-22 VITALS
DIASTOLIC BLOOD PRESSURE: 70 MMHG | BODY MASS INDEX: 32.27 KG/M2 | HEIGHT: 64 IN | WEIGHT: 189 LBS | SYSTOLIC BLOOD PRESSURE: 140 MMHG

## 2019-05-22 DIAGNOSIS — Z01.818 PREOPERATIVE EXAMINATION: Primary | ICD-10-CM

## 2019-05-22 PROCEDURE — PREOP: Performed by: OBSTETRICS & GYNECOLOGY

## 2019-05-30 DIAGNOSIS — F41.9 ANXIETY: ICD-10-CM

## 2019-05-30 RX ORDER — ALPRAZOLAM 1 MG/1
1 TABLET ORAL
Qty: 30 TABLET | Refills: 0 | Status: SHIPPED | OUTPATIENT
Start: 2019-05-30 | End: 2019-09-05

## 2019-06-18 ENCOUNTER — TELEPHONE (OUTPATIENT)
Dept: OBGYN CLINIC | Facility: CLINIC | Age: 44
End: 2019-06-18

## 2019-08-20 ENCOUNTER — TELEPHONE (OUTPATIENT)
Dept: FAMILY MEDICINE CLINIC | Facility: CLINIC | Age: 44
End: 2019-08-20

## 2019-08-26 DIAGNOSIS — F41.9 ANXIETY: ICD-10-CM

## 2019-08-26 NOTE — TELEPHONE ENCOUNTER
Pc from pt requesting refill Xanax 1mg 3 times a day  [facility]  Pt has appt 8/30/19   Pt is out of medicationl,

## 2019-08-27 RX ORDER — ALPRAZOLAM 1 MG/1
1 TABLET ORAL 3 TIMES DAILY PRN
Qty: 90 TABLET | Refills: 0 | Status: SHIPPED | OUTPATIENT
Start: 2019-08-27 | End: 2019-11-11 | Stop reason: SDUPTHER

## 2019-09-05 ENCOUNTER — OFFICE VISIT (OUTPATIENT)
Dept: FAMILY MEDICINE CLINIC | Facility: CLINIC | Age: 44
End: 2019-09-05
Payer: COMMERCIAL

## 2019-09-05 VITALS
HEART RATE: 74 BPM | BODY MASS INDEX: 34.77 KG/M2 | OXYGEN SATURATION: 99 % | RESPIRATION RATE: 16 BRPM | TEMPERATURE: 98.3 F | SYSTOLIC BLOOD PRESSURE: 100 MMHG | DIASTOLIC BLOOD PRESSURE: 66 MMHG | WEIGHT: 196.25 LBS | HEIGHT: 63 IN

## 2019-09-05 DIAGNOSIS — Z23 ENCOUNTER FOR IMMUNIZATION: ICD-10-CM

## 2019-09-05 DIAGNOSIS — K59.04 CHRONIC IDIOPATHIC CONSTIPATION: ICD-10-CM

## 2019-09-05 DIAGNOSIS — F41.9 ANXIETY: Primary | ICD-10-CM

## 2019-09-05 DIAGNOSIS — Z88.9 H/O SEASONAL ALLERGIES: ICD-10-CM

## 2019-09-05 PROCEDURE — 99214 OFFICE O/P EST MOD 30 MIN: CPT | Performed by: FAMILY MEDICINE

## 2019-09-05 PROCEDURE — 3008F BODY MASS INDEX DOCD: CPT | Performed by: FAMILY MEDICINE

## 2019-09-05 RX ORDER — FLUOXETINE 10 MG/1
10 CAPSULE ORAL DAILY
Qty: 30 CAPSULE | Refills: 1 | Status: SHIPPED | OUTPATIENT
Start: 2019-09-05 | End: 2021-09-21

## 2019-09-05 RX ORDER — CETIRIZINE HYDROCHLORIDE 10 MG/1
10 TABLET ORAL DAILY
Qty: 30 TABLET | Refills: 2 | Status: SHIPPED | OUTPATIENT
Start: 2019-09-05 | End: 2020-06-19

## 2019-09-05 NOTE — PROGRESS NOTES
Assessment/Plan:  Anxiety  Was discussed about cutting down on Xanax  I am going to start her on Prozac 20 mg daily  It was discussed about possible side effects of the medication  I will consider BuSpar  Referral to see psych  BMI 35 0-35 9,adult  It was discussed about low carb diet and regular exercise  H/O seasonal allergies  Stable  Continue same  She was given refills   Chronic idiopathic constipation  Encourage oral hydration, vegetable improved  Referral to GI  Diagnoses and all orders for this visit:    Anxiety  -     Ambulatory referral to Psychiatry; Future  -     FLUoxetine (PROzac) 10 mg capsule; Take 1 capsule (10 mg total) by mouth daily    H/O seasonal allergies  Comments:  Patient doesn't have symptoms currently  Ordered zyrtec  Orders:  -     cetirizine (ZyrTEC) 10 mg tablet; Take 1 tablet (10 mg total) by mouth daily    BMI 35 0-35 9,adult    Chronic idiopathic constipation  -     Ambulatory referral to Gastroenterology; Future    Encounter for immunization  -     TDAP VACCINE GREATER THAN OR EQUAL TO 8YO IM          Patient Instructions     Obesity   AMBULATORY CARE:   Obesity  is when your body mass index (BMI) is greater than 30  Your healthcare provider will use your height and weight to measure your BMI  The risks of obesity include  many health problems, such as injuries or physical disability  You may need tests to check for the following:  · Diabetes     · High blood pressure or high cholesterol     · Heart disease     · Gallbladder or liver disease     · Cancer of the colon, breast, prostate, liver, or kidney     · Sleep apnea     · Arthritis or gout  Seek care immediately if:   · You have a severe headache, confusion, or difficulty speaking  · You have weakness on one side of your body  · You have chest pain, sweating, or shortness of breath    Contact your healthcare provider if:   · You have symptoms of gallbladder or liver disease, such as pain in your upper abdomen  · You have knee or hip pain and discomfort while walking  · You have symptoms of diabetes, such as intense hunger and thirst, and frequent urination  · You have symptoms of sleep apnea, such as snoring or daytime sleepiness  · You have questions or concerns about your condition or care  Treatment for obesity  focuses on helping you lose weight to improve your health  Even a small decrease in BMI can reduce the risk for many health problems  Your healthcare provider will help you set a weight-loss goal   · Lifestyle changes  are the first step in treating obesity  These include making healthy food choices and getting regular physical activity  Your healthcare provider may suggest a weight-loss program that involves coaching, education, and therapy  · Medicine  may help you lose weight when it is used with a healthy diet and physical activity  · Surgery  can help you lose weight if you are very obese and have other health problems  There are several types of weight-loss surgery  Ask your healthcare provider for more information  Be successful losing weight:   · Set small, realistic goals  An example of a small goal is to walk for 20 minutes 5 days a week  Anther goal is to lose 5% of your body weight  · Tell friends, family members, and coworkers about your goals  and ask for their support  Ask a friend to lose weight with you, or join a weight-loss support group  · Identify foods or triggers that may cause you to overeat , and find ways to avoid them  Remove tempting high-calorie foods from your home and workplace  Place a bowl of fresh fruit on your kitchen counter  If stress causes you to eat, then find other ways to cope with stress  · Keep a diary to track what you eat and drink  Also write down how many minutes of physical activity you do each day  Weigh yourself once a week and record it in your diary  Eating changes:   You will need to eat 500 to 1,000 fewer calories each day than you currently eat to lose 1 to 2 pounds a week  The following changes will help you cut calories:  · Eat smaller portions  Use small plates, no larger than 9 inches in diameter  Fill your plate half full of fruits and vegetables  Measure your food using measuring cups until you know what a serving size looks like  · Eat 3 meals and 1 or 2 snacks each day  Plan your meals in advance  Gurmeet Saul and eat at home most of the time  Eat slowly  · Eat fruits and vegetables at every meal   They are low in calories and high in fiber, which makes you feel full  Do not add butter, margarine, or cream sauce to vegetables  Use herbs to season steamed vegetables  · Eat less fat and fewer fried foods  Eat more baked or grilled chicken and fish  These protein sources are lower in calories and fat than red meat  Limit fast food  Dress your salads with olive oil and vinegar instead of bottled dressing  · Limit the amount of sugar you eat  Do not drink sugary beverages  Limit alcohol  Activity changes:  Physical activity is good for your body in many ways  It helps you burn calories and build strong muscles  It decreases stress and depression, and improves your mood  It can also help you sleep better  Talk to your healthcare provider before you begin an exercise program   · Exercise for at least 30 minutes 5 days a week  Start slowly  Set aside time each day for physical activity that you enjoy and that is convenient for you  It is best to do both weight training and an activity that increases your heart rate, such as walking, bicycling, or swimming  · Find ways to be more active  Do yard work and housecleaning  Walk up the stairs instead of using elevators  Spend your leisure time going to events that require walking, such as outdoor festivals or fairs  This extra physical activity can help you lose weight and keep it off  Follow up with your healthcare provider as directed:   You may need to meet with a dietitian  Write down your questions so you remember to ask them during your visits  © 2017 [address] [employer] Information is for End User's use only and may not be sold, redistributed or otherwise used for commercial purposes  All illustrations and images included in CareNotes® are the copyrighted property of [employer] M [employer]  or Edvin De Jesus  The above information is an  only  It is not intended as medical advice for individual conditions or treatments  Talk to your doctor, nurse or pharmacist before following any medical regimen to see if it is safe and effective for you  Return in about 3 weeks (around 9/26/2019)  Subjective:      Patient ID: Ryland Mcnamara is a 40 y o  female  Chief Complaint   Patient presents with    Anxiety     9 month f/u       Kit is a 40 y o  F with a PMH of anxiety presenting today for a follow up for anxiety  She is tolerating the Xanax well, but has stated that she recently has been having "panic attacks" with seasonal changes, stress, and a couple episodes while driving  These attacks cause her to experience "tunnel vision" followed by her feet and fingers feeling numb and tingly which leads to feeling overwhelmed  When these occur, she takes a Xanax and then they subside, but she then feels "off" for the rest of the day  She is feeling okay today, but gets more anxious while having to talk about her anxiety at the office  She was fine at the beginning of the summer, but now the attacks are back, possibly due to her kids going back to school and the stress       The following portions of the patient's history were reviewed and updated as appropriate: allergies, current medications, past family history, past medical history, past social history, past surgical history and problem list     Review of Systems   Constitutional: Negative for chills, diaphoresis, fatigue, fever and unexpected weight change     HENT: Negative for ear pain, rhinorrhea, sore throat, tinnitus and trouble swallowing  Eyes: Negative for visual disturbance  Respiratory: Negative for cough, chest tightness and shortness of breath  Cardiovascular: Negative for chest pain, palpitations and leg swelling  Gastrointestinal: Positive for constipation  Negative for abdominal pain, diarrhea, nausea and vomiting  Endocrine: Negative for cold intolerance, heat intolerance, polydipsia and polyuria  Genitourinary: Negative for difficulty urinating, dysuria and frequency  Musculoskeletal: Negative for gait problem and joint swelling  Skin: Negative for rash  Allergic/Immunologic: Positive for environmental allergies  Neurological: Positive for light-headedness and numbness  Negative for dizziness, tremors, seizures and headaches  Hematological: Negative for adenopathy  Does not bruise/bleed easily  Psychiatric/Behavioral: Negative for behavioral problems, self-injury and suicidal ideas  The patient is nervous/anxious  Current Outpatient Medications   Medication Sig Dispense Refill    ALPRAZolam (XANAX) 1 mg tablet Take 1 tablet (1 mg total) by mouth 3 (three) times a day as needed for anxiety for up to 167 days 90 tablet 0    butalbital-acetaminophen-caffeine (FIORICET,ESGIC) -40 mg per tablet TAKE 1 TABLET BY MOUTH 3 (THREE) TIMES A DAY AS NEEDED FOR MIGRAINE 60 tablet 1    cetirizine (ZyrTEC) 10 mg tablet Take 1 tablet (10 mg total) by mouth daily 30 tablet 2    FLUoxetine (PROzac) 10 mg capsule Take 1 capsule (10 mg total) by mouth daily 30 capsule 1     No current facility-administered medications for this visit          Objective:    /66 (BP Location: Left arm, Patient Position: Sitting, Cuff Size: Large)   Pulse 74   Temp 98 3 °F (36 8 °C) (Tympanic)   Resp 16   Ht 5' 2 5" (1 588 m)   Wt 89 kg (196 lb 4 oz)   SpO2 99%   BMI 35 32 kg/m²        Physical Exam   Constitutional: She is oriented to person, place, and time  She appears well-developed and well-nourished  HENT:   Head: Normocephalic and atraumatic  Right Ear: External ear normal    Left Ear: External ear normal    Mouth/Throat: Oropharynx is clear and moist    Eyes: Pupils are equal, round, and reactive to light  EOM are normal    Neck: Normal range of motion  Neck supple  Cardiovascular: Normal rate, regular rhythm and normal heart sounds  Exam reveals no gallop and no friction rub  No murmur heard  Pulmonary/Chest: Effort normal and breath sounds normal  No stridor  She has no wheezes  She has no rales  Abdominal: Soft  Bowel sounds are normal    Musculoskeletal: Normal range of motion  She exhibits no edema  Lymphadenopathy:     She has no cervical adenopathy  Neurological: She is alert and oriented to person, place, and time  No cranial nerve deficit  Skin: Skin is warm and dry  She is not diaphoretic  No erythema  Psychiatric: She has a normal mood and affect  Nursing note and vitals reviewed  Kj Wagner MD BMI Counseling: Body mass index is 35 32 kg/m²  Discussed the patient's BMI with her  The BMI is above average  BMI counseling and education was provided to the patient  Nutrition recommendations include reducing portion sizes, decreasing overall calorie intake and 3-5 servings of fruits/vegetables daily  Exercise recommendations include moderate aerobic physical activity for 150 minutes/week

## 2019-09-05 NOTE — ASSESSMENT & PLAN NOTE
Was discussed about cutting down on Xanax  I am going to start her on Prozac 20 mg daily  It was discussed about possible side effects of the medication  I will consider BuSpar  Referral to see psych

## 2019-09-05 NOTE — PATIENT INSTRUCTIONS
Obesity   AMBULATORY CARE:   Obesity  is when your body mass index (BMI) is greater than 30  Your healthcare provider will use your height and weight to measure your BMI  The risks of obesity include  many health problems, such as injuries or physical disability  You may need tests to check for the following:  · Diabetes     · High blood pressure or high cholesterol     · Heart disease     · Gallbladder or liver disease     · Cancer of the colon, breast, prostate, liver, or kidney     · Sleep apnea     · Arthritis or gout  Seek care immediately if:   · You have a severe headache, confusion, or difficulty speaking  · You have weakness on one side of your body  · You have chest pain, sweating, or shortness of breath  Contact your healthcare provider if:   · You have symptoms of gallbladder or liver disease, such as pain in your upper abdomen  · You have knee or hip pain and discomfort while walking  · You have symptoms of diabetes, such as intense hunger and thirst, and frequent urination  · You have symptoms of sleep apnea, such as snoring or daytime sleepiness  · You have questions or concerns about your condition or care  Treatment for obesity  focuses on helping you lose weight to improve your health  Even a small decrease in BMI can reduce the risk for many health problems  Your healthcare provider will help you set a weight-loss goal   · Lifestyle changes  are the first step in treating obesity  These include making healthy food choices and getting regular physical activity  Your healthcare provider may suggest a weight-loss program that involves coaching, education, and therapy  · Medicine  may help you lose weight when it is used with a healthy diet and physical activity  · Surgery  can help you lose weight if you are very obese and have other health problems  There are several types of weight-loss surgery  Ask your healthcare provider for more information    Be successful losing weight:   · Set small, realistic goals  An example of a small goal is to walk for 20 minutes 5 days a week  Anther goal is to lose 5% of your body weight  · Tell friends, family members, and coworkers about your goals  and ask for their support  Ask a friend to lose weight with you, or join a weight-loss support group  · Identify foods or triggers that may cause you to overeat , and find ways to avoid them  Remove tempting high-calorie foods from your home and workplace  Place a bowl of fresh fruit on your kitchen counter  If stress causes you to eat, then find other ways to cope with stress  · Keep a diary to track what you eat and drink  Also write down how many minutes of physical activity you do each day  Weigh yourself once a week and record it in your diary  Eating changes: You will need to eat 500 to 1,000 fewer calories each day than you currently eat to lose 1 to 2 pounds a week  The following changes will help you cut calories:  · Eat smaller portions  Use small plates, no larger than 9 inches in diameter  Fill your plate half full of fruits and vegetables  Measure your food using measuring cups until you know what a serving size looks like  · Eat 3 meals and 1 or 2 snacks each day  Plan your meals in advance  Maria R Martínez and eat at home most of the time  Eat slowly  · Eat fruits and vegetables at every meal   They are low in calories and high in fiber, which makes you feel full  Do not add butter, margarine, or cream sauce to vegetables  Use herbs to season steamed vegetables  · Eat less fat and fewer fried foods  Eat more baked or grilled chicken and fish  These protein sources are lower in calories and fat than red meat  Limit fast food  Dress your salads with olive oil and vinegar instead of bottled dressing  · Limit the amount of sugar you eat  Do not drink sugary beverages  Limit alcohol  Activity changes:  Physical activity is good for your body in many ways   It helps you burn calories and build strong muscles  It decreases stress and depression, and improves your mood  It can also help you sleep better  Talk to your healthcare provider before you begin an exercise program   · Exercise for at least 30 minutes 5 days a week  Start slowly  Set aside time each day for physical activity that you enjoy and that is convenient for you  It is best to do both weight training and an activity that increases your heart rate, such as walking, bicycling, or swimming  · Find ways to be more active  Do yard work and housecleaning  Walk up the stairs instead of using elevators  Spend your leisure time going to events that require walking, such as outdoor festivals or fairs  This extra physical activity can help you lose weight and keep it off  Follow up with your healthcare provider as directed: You may need to meet with a dietitian  Write down your questions so you remember to ask them during your visits  © 2017 [address] Information is for End User's use only and may not be sold, redistributed or otherwise used for commercial purposes  All illustrations and images included in CareNotes® are the copyrighted property of [employer] Inc  or Edvin De Jesus  The above information is an  only  It is not intended as medical advice for individual conditions or treatments  Talk to your doctor, nurse or pharmacist before following any medical regimen to see if it is safe and effective for you

## 2019-10-25 ENCOUNTER — TELEPHONE (OUTPATIENT)
Dept: FAMILY MEDICINE CLINIC | Facility: CLINIC | Age: 44
End: 2019-10-25

## 2019-10-25 NOTE — TELEPHONE ENCOUNTER
Pt last seen 19  I checked [facility] website and copied and pasted past refills in chart  Sent to provider to review and approve  Report Prepared: 10/25/2019   Date Range: 10/25/2018 - 10/25/2019       Download PDF      Download CSV    neyda parmar     Linked Records  Name  ID Gender Address   Randa Curtis 1975 1 female [address] [facility] [address]   Report Criteria  First Namejennifer  Last Nameventrudo  DOB1975  Summary      Summary  Total Prescriptions   7   Total Private Pay   0   Total Prescribers   1   Total Pharmacies   1    Opioids* (excluding buprenorphine)  Current Qty   0 0   Current MME/day   0 0   30 Day Avg MME/day   0 0    Buprenorphine*  Current Qty   0 0   Current mg/day   0 0   30 Day Avg mg/day   0 0    Prescriptions    Filled  ID  Written  Drug  QTY  Days  Prescriber  Rx #  Pharmacy *  Refills  Daily Dose  Pymt Type      2019  1  2019  ALPRAZOLAM 1 MG TABLET  90 0  30  [facility]  [id number]  [facility] (8258[place]    2019  1  2019  ALPRAZOLAM 1 MG TABLET  30 0  30  [facility]  [id number]  [facility] (8258[place]    2019  1  2019  ALPRAZOLAM 1 MG TABLET  90 0  30  [facility]  [id number]  [facility] (8258[place]    2019  1  2019  ALPRAZOLAM 1 MG TABLET  90 0  30  [facility]  [id number]  [facility] (8258[place]    2019  1  2018  ALPRAZOLAM 1 MG TABLET  90 0  30  [facility]  [id number]  [facility] (8258[place]    2018  1  2018  ALPRAZOLAM 1 MG TABLET  90 0  30  [facility]  [id number]  [facility] (8258)  [place]    10/30/2018  1  10/05/2018  ALPRAZOLAM 1 MG TABLET  90 0  30  [facility]  [id number]  [facility] (8258)  1   [place] [facility]  [place]    *Pharmacy is created using a combination of pharmacy name and the last four digits of the pharmacy license number     *Per CDC guidance, the MME conversion factors prescribed or provided as part of medication-assisted treatment for opioid use disorder should not be used to benchmark against dosage thresholds meant for opioids prescribed for pain  Buprenorphine products have no agreed upon morphine equivalency, and as partial opioid agonists, are not expected to be associated with overdose risk in the same dose-dependent manner as doses for full agonist opioids  MME = morphine milligram equivalents  mg = dose in milligrams  Prescribers    Name  Ruthie Bautista [address]  Zip  Phone    Jerome Alva MD  [address] [address]  [phone]    [address] [place] [address]  Zip  Phone    [id number]  [facility]  [phone]    ×   Contested Record Flag    Prescriber Delegate - Unlicensed Disclaimer:  Information from the [address] is protected health information and any information accessed must be treated as confidential  Any person who unintentionally or intentionally makes an unauthorized disclosure of information from the [address] will be subject to civil and criminal penalties as set forth in the [employer] Act 2014-191, Act of Oct  27, 2014, P L  8414  The information in the [address] may contain errors resulting from the reporting of information received  Additional independent verification of patient profile information with pharmacies and prescribers may sometimes be prudent or necessary

## 2019-11-01 DIAGNOSIS — F41.9 ANXIETY: ICD-10-CM

## 2019-11-11 ENCOUNTER — OFFICE VISIT (OUTPATIENT)
Dept: FAMILY MEDICINE CLINIC | Facility: CLINIC | Age: 44
End: 2019-11-11
Payer: COMMERCIAL

## 2019-11-11 ENCOUNTER — TELEPHONE (OUTPATIENT)
Dept: FAMILY MEDICINE CLINIC | Facility: CLINIC | Age: 44
End: 2019-11-11

## 2019-11-11 VITALS
HEIGHT: 63 IN | WEIGHT: 200.6 LBS | BODY MASS INDEX: 35.54 KG/M2 | OXYGEN SATURATION: 97 % | DIASTOLIC BLOOD PRESSURE: 68 MMHG | SYSTOLIC BLOOD PRESSURE: 104 MMHG | TEMPERATURE: 98 F | HEART RATE: 86 BPM | RESPIRATION RATE: 16 BRPM

## 2019-11-11 DIAGNOSIS — F41.9 ANXIETY: Primary | ICD-10-CM

## 2019-11-11 DIAGNOSIS — L30.9 DERMATITIS: ICD-10-CM

## 2019-11-11 PROCEDURE — 99214 OFFICE O/P EST MOD 30 MIN: CPT | Performed by: FAMILY MEDICINE

## 2019-11-11 PROCEDURE — 1036F TOBACCO NON-USER: CPT | Performed by: FAMILY MEDICINE

## 2019-11-11 RX ORDER — ERGOCALCIFEROL 1.25 MG/1
CAPSULE ORAL
COMMUNITY
Start: 2019-10-02 | End: 2020-06-13

## 2019-11-11 RX ORDER — TRIAMCINOLONE ACETONIDE 5 MG/G
CREAM TOPICAL 3 TIMES DAILY
Qty: 30 G | Refills: 0 | Status: SHIPPED | OUTPATIENT
Start: 2019-11-11 | End: 2020-10-05 | Stop reason: SDUPTHER

## 2019-11-11 RX ORDER — ALPRAZOLAM 1 MG/1
1 TABLET ORAL DAILY PRN
Qty: 30 TABLET | Refills: 1 | Status: SHIPPED | OUTPATIENT
Start: 2019-11-11 | End: 2020-01-16

## 2019-11-11 NOTE — TELEPHONE ENCOUNTER
Pc from pt states she was told she would get 3 refills of Xanax but she was not given any [facility]  Pts phone #[phone]

## 2019-11-11 NOTE — PROGRESS NOTES
Assessment/Plan:  No problem-specific Assessment & Plan notes found for this encounter    Lauren General  Age: 40 Data as of: 11/11/2019   Demographics     Linked Records                Search Criteria               Summary     Summary   Total Prescriptions: 16   Total Prescribers: 2   Total Pharmacies: 1   Narcotics*     (excluding buprenorphine)  Current Qty: 0   Current MME/day: 0 00   30 Day Avg MME/day: 0 00   Buprenorphine*   Current Qty: 0   Current mg/day: 0 00   30 Day Avg mg/day: 0 00      Prescriptions     PRESCRIPTIONS  Total Prescriptions: 16   Total Private Pay: 0   Fill Date ID Written Drug Qty Days Prescriber Rx # Pharmacy Refill Daily Dose * Pymt Type    08/27/2019  1   08/27/2019  Alprazolam 1 MG Tablet  90 00 30 Wa Abo  [id number]   Bat (8258)  0   [place]   05/30/2019  1   05/30/2019  Alprazolam 1 MG Tablet  30 00 30 Wa Abo  [id number]   Bat (8258)  0   [place]   03/16/2019  1   03/04/2019  Alprazolam 1 MG Tablet  90 00 30 Wa Abo  [id number]   Bat (8258)  0   [place] Ins  [place]   02/19/2019  1   02/18/2019  Alprazolam 1 MG Tablet  90 00 30 Wa Abo  [id number]   Bat (8258)  0   Comm Ins  [place]   01/04/2019  1   08/31/2018  Alprazolam 1 MG Tablet  90 00 30 Wa Abo  [id number]   Bat (8258)  0   [place] Ins  [place]   12/06/2018  1   12/06/2018  Alprazolam 1 MG Tablet  90 00 30 Wa Abo  [id number]   Bat (8258)  0   Comm Ins  [place]   10/30/2018  1   10/05/2018  Alprazolam 1 MG Tablet  90 00 30 Wa Abo  [id number]   Bat (8258)  1   Comm Ins  [place]   10/05/2018  1   10/05/2018  Alprazolam 1 MG Tablet  90 00 30 Wa Abo  [id number]   Bat (8258)  0   Comm Ins  [place]   08/24/2018  1   08/24/2018  Alprazolam 1 MG Tablet  90 00 30 Wa Abo  [id number]   Bat (8258)  0   Comm Ins  [place]   07/16/2018  1   07/16/2018  Alprazolam 1 MG Tablet  90 00 30 Wa Abo  [id number]   Bat (8258)  0   Comm Ins  [place]   06/08/2018  1   02/20/2018  Alprazolam 1 MG Tablet  90 00 30 Cy Sie  [id number]   Bat (8258)  2   Comm Ins  [place]   04/19/2018  1   02/20/2018  Alprazolam 1 MG Tablet 90 00 30 Cy Sie  [id number]   Bat (8258)  1   Comm Ins  [place]   03/10/2018  1   02/20/2018  Alprazolam 1 MG Tablet  90 00 30 Cy Sie  [id number]   Bat (8258)  0   Comm Ins  [place]   02/09/2018  1   11/02/2017  Alprazolam 1 MG Tablet  90 00 30 Cy Sie  [id number]   Bat (8258)  3   Comm Ins  [place]   01/06/2018  1   11/02/2017  Alprazolam 1 MG Tablet  90 00 30 Cy Sie  [id number]   Bat (8258)  2   Comm Ins  [place]   12/14/2017  1   11/02/2017  Alprazolam 1 MG Tablet  90 00 30 Cy Sie  [id number]   Bat (8258)  1   Comm Ins  [place]   *Per CDC guidance, the MME conversion factors prescribed or provided as part of the medication-assisted treatment for opioid use disorder should not be used to benchmark against dosage thresholds meant for opioids prescribed for pain  Buprenorphine products have no agreed upon morphine equivalency, and as partial opioid agonists, are not expected to be associated with overdose risk in the same dose-dependent manner as doses for full agonist opioids  MME = morphine milligram equivalents  LME = Lorazepam milligram equivalents  MG = dose in milligrams  PROVIDERS  Total Providers: 2   Name [facility] Phone   [place], [place] [address] [place] [address]    PHARMACIES  Total Pharmacies: 1   Name [address] [facility] [address] [place] [address]  [address] [facility] [phone]          Diagnoses and all orders for this visit:    Anxiety  Comments:  Stable  Continue same  Will continue to monitor  Other orders  -     ergocalciferol (VITAMIN D2) 50,000 units          There are no Patient Instructions on file for this visit  No follow-ups on file  Subjective:      Patient ID: Ismael Hamm is a 40 y o  female      Chief Complaint   Patient presents with    Follow-up     2 month for anxiety       HPI    The following portions of the patient's history were reviewed and updated as appropriate: allergies, current medications, past family history, past medical history, past social history, past surgical history and problem list     Review of Systems      Current Outpatient Medications   Medication Sig Dispense Refill    ALPRAZolam (XANAX) 1 mg tablet Take 1 tablet (1 mg total) by mouth 3 (three) times a day as needed for anxiety for up to 167 days 90 tablet 0    butalbital-acetaminophen-caffeine (FIORICET,ESGIC) -40 mg per tablet TAKE 1 TABLET BY MOUTH 3 (THREE) TIMES A DAY AS NEEDED FOR MIGRAINE 60 tablet 1    cetirizine (ZyrTEC) 10 mg tablet Take 1 tablet (10 mg total) by mouth daily 30 tablet 2    ergocalciferol (VITAMIN D2) 50,000 units       FLUoxetine (PROzac) 10 mg capsule Take 1 capsule (10 mg total) by mouth daily (Patient not taking: Reported on 11/11/2019) 30 capsule 1     No current facility-administered medications for this visit          Objective:    /68 (BP Location: Left arm, Patient Position: Sitting, Cuff Size: Large)   Pulse 86   Temp 98 °F (36 7 °C) (Tympanic)   Resp 16   Ht 5' 2 5" (1 588 m)   Wt 91 kg (200 lb 9 6 oz)   SpO2 97%   BMI 36 11 kg/m²        Physical Exam           Osmani Ryan MD

## 2019-11-11 NOTE — PROGRESS NOTES
Assessment/Plan:  Angella Gregg  Age: 40 Data as of: 11/11/2019   Demographics     Linked Records                Search Criteria               Summary     Summary   Total Prescriptions: 16   Total Prescribers: 2   Total Pharmacies: 1   Narcotics*     (excluding buprenorphine)  Current Qty: 0   Current MME/day: 0 00   30 Day Avg MME/day: 0 00   Buprenorphine*   Current Qty: 0   Current mg/day: 0 00   30 Day Avg mg/day: 0 00      Prescriptions     PRESCRIPTIONS  Total Prescriptions: 16   Total Private Pay: 0   Fill Date ID Written Drug Qty Days Prescriber Rx # Pharmacy Refill Daily Dose * Pymt Type    08/27/2019  1   08/27/2019  Alprazolam 1 MG Tablet  90 00 30 Wa Abo  [id number]   Bat (8258)  0   Comm Ins  [place]   05/30/2019  1   05/30/2019  Alprazolam 1 MG Tablet  30 00 30 Wa Abo  [id number]   Bat (8258)  0   Comm Ins  [place]   03/16/2019  1   03/04/2019  Alprazolam 1 MG Tablet  90 00 30 Wa Abo  [id number]   Bat (8258)  0   Comm Ins  [place]   02/19/2019  1   02/18/2019  Alprazolam 1 MG Tablet  90 00 30 Wa Abo  [id number]   Bat (8258)  0   Comm Ins  [place]   01/04/2019  1   08/31/2018  Alprazolam 1 MG Tablet  90 00 30 Wa Abo  [id number]   Bat (8258)  0   Comm Ins  [place]   12/06/2018  1   12/06/2018  Alprazolam 1 MG Tablet  90 00 30 Wa Abo  [id number]   Bat (8258)  0   Comm Ins  [place]   10/30/2018  1   10/05/2018  Alprazolam 1 MG Tablet  90 00 30 Wa Abo  [id number]   Bat (8258)  1   Comm Ins  [place]   10/05/2018  1   10/05/2018  Alprazolam 1 MG Tablet  90 00 30 Wa Abo  [id number]   Bat (8258)  0   Comm Ins  [place]   08/24/2018  1   08/24/2018  Alprazolam 1 MG Tablet  90 00 30 Wa Abo  [id number]   Bat (8258)  0   Comm Ins  [place]   07/16/2018  1   07/16/2018  Alprazolam 1 MG Tablet  90 00 30 Wa Abo  [id number]   Bat (8258)  0   Comm Ins  [place]   06/08/2018  1   02/20/2018  Alprazolam 1 MG Tablet  90 00 30 Cy Sie  [id number]   Bat (8258)  2   Comm Ins  [place]   04/19/2018  1   02/20/2018  Alprazolam 1 MG Tablet  90 00 30 Cy Sie  [id number]   Bat (8258)  1   Comm Ins  [place]   03/10/2018  1 02/20/2018  Alprazolam 1 MG Tablet  90 00 30 Cy Sie  [id number]   Bat (8258)  0   Comm Ins  [place]   02/09/2018  1   11/02/2017  Alprazolam 1 MG Tablet  90 00 30 Cy Sie  [id number]   Bat (8258)  3   Comm Ins  [place]   01/06/2018  1   11/02/2017  Alprazolam 1 MG Tablet  90 00 30 Cy Sie  [id number]   Bat (8258)  2   Comm Ins  [place]   12/14/2017  1   11/02/2017  Alprazolam 1 MG Tablet  90 00 30 Cy Sie  [id number]   Bat (8258)  1   Comm Ins  [place]   *Per CDC guidance, the MME conversion factors prescribed or provided as part of the medication-assisted treatment for opioid use disorder should not be used to benchmark against dosage thresholds meant for opioids prescribed for pain  Buprenorphine products have no agreed upon morphine equivalency, and as partial opioid agonists, are not expected to be associated with overdose risk in the same dose-dependent manner as doses for full agonist opioids  MME = morphine milligram equivalents  LME = Lorazepam milligram equivalents  MG = dose in milligrams  PROVIDERS  Total Providers: 2   Name [facility] Phone   [address] [place] [address] [id number] [place] [address] [address] Painter [address]    PHARMACIES  Total Pharmacies: 1   Name [address] [place] [address] [phone] [address] [facility] [phone]         Anxiety  Improved with CBD oil  Cut down on Xanax to 1 mg 1 tablet daily as needed  Stop Prozac since it did not work and she had side effects  Consider medical marijuana  Dermatitis  She was given prescription for triamcinolone cream apply 3 times a day  If symptoms do not improved  She is to see dermatologist        Diagnoses and all orders for this visit:    Anxiety  -     ALPRAZolam (XANAX) 1 mg tablet;  Take 1 tablet (1 mg total) by mouth daily as needed for anxiety    Dermatitis  -     triamcinolone (KENALOG) 0 5 % cream; Apply topically 3 (three) times a day    Other orders  - ergocalciferol (VITAMIN D2) 50,000 units        Subjective:      Patient ID: Perry Langford is a 40 y o  female  40year-old female with history of generalized anxiety disorder, restless leg syndrome, and major depressive disorder who presents today for follow-up of medication adjustments completed two months ago  Patient was continued on 1mg alprazolam (Xanax) and 10mg fluoxetine (Prozac) daily for treatment of her anxiety at her last visit  Since, she has reported fatigue and a cloudy feeling with fluoxetine (Prozac) use and discontinued  She continues to take the Xanax daily as prescribed and has begun taking CBD oil nightly to aid with sleep  She has cut out all caffeine from her diet  Reports that anxiety is well-controlled after making these adjustments  Reports daily fatigue but denies depression, dizziness, weakness, decrease in concentration, or sleep disturbances  Patient also states that a rash started around the right corner of her mouth approximately six weeks ago  Denies pruritis or pain but states that the rash has been persistent with intermittent inflammation  The following portions of the patient's history were reviewed and updated as appropriate: allergies, past family history, past social history and past surgical history  Review of Systems   Constitutional: Negative for chills, fatigue and fever  HENT: Negative for congestion, sore throat and trouble swallowing  Eyes: Negative  Negative for visual disturbance  Respiratory: Negative for cough and shortness of breath  Cardiovascular: Negative for chest pain, palpitations and leg swelling  Gastrointestinal: Negative for abdominal distention, abdominal pain, constipation and diarrhea  Endocrine: Negative for cold intolerance and heat intolerance  Genitourinary: Negative  Negative for difficulty urinating and dysuria  Musculoskeletal: Negative for arthralgias, gait problem and myalgias     Skin: Positive for rash (Rash around right corner of mouth x6 weeks  )  Negative for pallor  Allergic/Immunologic: Negative  Neurological: Positive for headaches  Negative for dizziness, tremors, seizures and weakness  Hematological: Negative  Negative for adenopathy  Psychiatric/Behavioral: Negative for behavioral problems, decreased concentration and sleep disturbance  The patient is nervous/anxious  Objective:      /68 (BP Location: Left arm, Patient Position: Sitting, Cuff Size: Large)   Pulse 86   Temp 98 °F (36 7 °C) (Tympanic)   Resp 16   Ht 5' 2 5" (1 588 m)   Wt 91 kg (200 lb 9 6 oz)   SpO2 97%   BMI 36 11 kg/m²          Physical Exam   Constitutional: She is oriented to person, place, and time  She appears well-developed and well-nourished  No distress  HENT:   Head: Normocephalic and atraumatic  Eyes: Pupils are equal, round, and reactive to light  EOM are normal    Neck: Normal range of motion  Neck supple  No thyromegaly present  Cardiovascular: Normal rate, regular rhythm and normal heart sounds  Pulmonary/Chest: Effort normal and breath sounds normal  No respiratory distress  Abdominal: Soft  Bowel sounds are normal  She exhibits no distension  There is no tenderness  Musculoskeletal: Normal range of motion  She exhibits no edema  Mild swelling of Left 2nd PIP joint  Non-tender and non-erythematous   Lymphadenopathy:     She has no cervical adenopathy  Neurological: She is alert and oriented to person, place, and time  No cranial nerve deficit  Skin: Skin is warm and dry  Rash (Right corner of mouth - eryhtematous, non-palpable) noted  Psychiatric: She has a normal mood and affect  Her behavior is normal  Thought content normal    Nursing note and vitals reviewed

## 2019-11-13 RX ORDER — ALPRAZOLAM 1 MG/1
TABLET ORAL
Qty: 90 TABLET | OUTPATIENT
Start: 2019-11-13

## 2019-11-13 NOTE — ASSESSMENT & PLAN NOTE
She was given prescription for triamcinolone cream apply 3 times a day  If symptoms do not improved    She is to see dermatologist

## 2019-11-13 NOTE — ASSESSMENT & PLAN NOTE
Improved with CBD oil  Cut down on Xanax to 1 mg 1 tablet daily as needed  Stop Prozac since it did not work and she had side effects  Consider medical marijuana

## 2020-01-09 DIAGNOSIS — F41.9 ANXIETY: ICD-10-CM

## 2020-01-16 RX ORDER — ALPRAZOLAM 1 MG/1
1 TABLET ORAL DAILY PRN
Qty: 30 TABLET | Refills: 0 | Status: SHIPPED | OUTPATIENT
Start: 2020-01-16 | End: 2020-03-02 | Stop reason: SDUPTHER

## 2020-01-16 NOTE — TELEPHONE ENCOUNTER
Jey Brannon   Age: 40  Data as of: 01/16/2020    Demographics    Linked Records  Search Criteria            Summary    Summary  Total Prescriptions:    16    Total Prescribers:    2    Total Pharmacies:    1    Narcotics* (excluding Buprenorphine)  Current Qty:   0   Current MME/day:   0 00   30 Day Avg MME/day:   0 00   Buprenorphine*  Current Qty:   0   Current mg/day:   0 00   30 Day Avg mg/day:   0 00   Prescriptions    *Highlighted drugs could not be included in score calculations   Prescriptions  Total Prescriptions: 16    Total Private Pay: 0    Fill Date ID   Written Drug Qty Days Prescriber Rx # Pharmacy Refill   Daily Dose* Pymt Type      12/10/2019  1   11/11/2019  Alprazolam 1 MG Tablet  30 00 30 Wa Abo   [id number]   Bat (8258)   1   Comm Ins   [place]   11/11/2019  1   11/11/2019  Alprazolam 1 MG Tablet  30 00 30 Wa Abo   [id number]   Bat (8258)   0   Comm Ins   [place]   08/27/2019  1   08/27/2019  Alprazolam 1 MG Tablet  90 00 30 Wa Abo   [id number]   Bat (8258)   0   Comm Ins   [place]   05/30/2019  1   05/30/2019  Alprazolam 1 MG Tablet  30 00 30 Wa Abo   [id number]   Bat (8258)   0   Comm Ins   [place]   03/16/2019  1   03/04/2019  Alprazolam 1 MG Tablet  90 00 30 Wa Abo   [id number]   Bat (8258)   0   Comm Ins   [place]   02/19/2019  1   02/18/2019  Alprazolam 1 MG Tablet  90 00 30 Wa Abo   [id number]   Bat (8258)   0   Comm Ins   [place]   01/04/2019  1   08/31/2018  Alprazolam 1 MG Tablet  90 00 30 Wa Abo   [id number]   Bat (8258)   0   Comm Ins   [place]   12/06/2018  1   12/06/2018  Alprazolam 1 MG Tablet  90 00 30 Wa Abo   [id number]   Bat (8258)   0   Comm Ins   [place]   10/30/2018  1   10/05/2018  Alprazolam 1 MG Tablet  90 00 30 Wa Abo   [id number]   Bat (8258)   1   Comm Ins   [place]   10/05/2018  1   10/05/2018  Alprazolam 1 MG Tablet  90 00 30 Wa Abo   [id number]   Bat (8258)   0   Comm Ins   [place]   08/24/2018  1   08/24/2018  Alprazolam 1 MG Tablet  90 00 30 Wa Abo   [id number]   Bat (8258)   0   Comm Ins   [place]   07/16/2018  1   07/16/2018  Alprazolam 1 MG Tablet  90 00 30 Wa Abo   [id number]   Bat (8258)   0   Comm Ins   [place]   06/08/2018  1   02/20/2018  Alprazolam 1 MG Tablet  90 00 30 Cy Sie   [id number]   Bat (8258)   2   Comm Ins   [place]   04/19/2018  1   02/20/2018  Alprazolam 1 MG Tablet  90 00 30 Cy Sie   [id number]   Bat (8258)   1   Comm Ins   [place]   03/10/2018  1   02/20/2018  Alprazolam 1 MG Tablet  90 00 30 Cy Sie   [id number]   Bat (8258)   0   Comm Ins   [place]   02/09/2018  1   11/02/2017  Alprazolam 1 MG Tablet  90 00 30 Cy Sie   [id number]   Bat (8258)   3   Comm Ins   [place]   *Per CDC guidance, the MME conversion factors prescribed or provided as part of the medication-assisted treatment for opioid use disorder should not be used to benchmark against dosage thresholds meant for opioids prescribed for pain  Buprenorphine products have no agreed upon morphine equivalency, and as partial opioid agonists, are not expected to be associated with overdose risk in the same dose-dependent manner as doses for full agonist opioids  MME = morphine milligram equivalents  LME = Lorazepam milligram equivalents  MG = dose in milligrams     Providers  Total Providers: 2   Name [facility] Phone   [address] [facility] [address]    Pharmacies  Total Pharmacies: 1   Name [address] [place] [address] [phone] [place] [facility]

## 2020-02-21 ENCOUNTER — TELEPHONE (OUTPATIENT)
Dept: FAMILY MEDICINE CLINIC | Facility: CLINIC | Age: 45
End: 2020-02-21

## 2020-03-02 ENCOUNTER — OFFICE VISIT (OUTPATIENT)
Dept: FAMILY MEDICINE CLINIC | Facility: CLINIC | Age: 45
End: 2020-03-02
Payer: COMMERCIAL

## 2020-03-02 VITALS
OXYGEN SATURATION: 97 % | HEIGHT: 63 IN | HEART RATE: 78 BPM | WEIGHT: 201.8 LBS | BODY MASS INDEX: 35.75 KG/M2 | DIASTOLIC BLOOD PRESSURE: 74 MMHG | TEMPERATURE: 98.2 F | SYSTOLIC BLOOD PRESSURE: 110 MMHG | RESPIRATION RATE: 16 BRPM

## 2020-03-02 DIAGNOSIS — F41.9 ANXIETY: ICD-10-CM

## 2020-03-02 DIAGNOSIS — G43.109 MIGRAINE WITH AURA AND WITHOUT STATUS MIGRAINOSUS, NOT INTRACTABLE: ICD-10-CM

## 2020-03-02 DIAGNOSIS — Z12.31 BREAST CANCER SCREENING BY MAMMOGRAM: Primary | ICD-10-CM

## 2020-03-02 DIAGNOSIS — K21.9 GASTROESOPHAGEAL REFLUX DISEASE WITHOUT ESOPHAGITIS: ICD-10-CM

## 2020-03-02 PROCEDURE — 3008F BODY MASS INDEX DOCD: CPT | Performed by: FAMILY MEDICINE

## 2020-03-02 PROCEDURE — 99213 OFFICE O/P EST LOW 20 MIN: CPT | Performed by: FAMILY MEDICINE

## 2020-03-02 PROCEDURE — 1036F TOBACCO NON-USER: CPT | Performed by: FAMILY MEDICINE

## 2020-03-02 RX ORDER — ALPRAZOLAM 1 MG/1
1 TABLET ORAL DAILY PRN
Qty: 30 TABLET | Refills: 0 | Status: SHIPPED | OUTPATIENT
Start: 2020-03-02 | End: 2020-04-03 | Stop reason: SDUPTHER

## 2020-03-02 NOTE — ASSESSMENT & PLAN NOTE
Not well controlled but improving  Continue with Xanax as needed  She is considering medical marijuana

## 2020-03-02 NOTE — PROGRESS NOTES
Assessment/Plan:  GERD (gastroesophageal reflux disease)  Stable without meds  Continue to monitor  Migraine with aura and without status migrainosus, not intractable  Stable  Continue to monitor  Anxiety  Not well controlled but improving  Continue with Xanax as needed  She is considering medical marijuana    Geri Holt   Age: 39  Data as of: 03/02/2020    Demographics    Linked Records                Search Criteria            Summary    Summary  Total Prescriptions:    16    Total Prescribers:    2    Total Pharmacies:    1    Narcotics* (excluding Buprenorphine)  Current Qty:   0   Current MME/day:   0 00   30 Day Avg MME/day:   0 00   Buprenorphine*  Current Qty:   0   Current mg/day:   0 00   30 Day Avg mg/day:   0 00   Prescriptions    *Highlighted drugs could not be included in score calculations   Prescriptions  Total Prescriptions: 16    Total Private Pay: 0    Fill Date ID   Written Drug Qty Days Prescriber Rx # Pharmacy Refill   Daily Dose* Pymt Type      01/16/2020  1   01/16/2020  Alprazolam 1 MG Tablet  30 00 30 Wa Abo   [id number]   Bat (8258)   0   Comm Ins   [place]   12/10/2019  1   11/11/2019  Alprazolam 1 MG Tablet  30 00 30 Wa Abo   [id number]   Bat (8258)   1   Comm Ins   [place]   11/11/2019  1   11/11/2019  Alprazolam 1 MG Tablet  30 00 30 Wa Abo   [id number]   Bat (8258)   0   Comm Ins   [place]   08/27/2019  1   08/27/2019  Alprazolam 1 MG Tablet  90 00 30 Wa Abo   [id number]   Bat (8258)   0   Comm Ins   [place]   05/30/2019  1   05/30/2019  Alprazolam 1 MG Tablet  30 00 30 Wa Abo   [id number]   Bat (8258)   0   Comm Ins   [place]   03/16/2019  1   03/04/2019  Alprazolam 1 MG Tablet  90 00 30 Wa Abo   [id number]   Bat (8258)   0   Comm Ins   [place]   02/19/2019  1   02/18/2019  Alprazolam 1 MG Tablet  90 00 30 Wa Abo   [id number]   Bat (8258)   0   Comm Ins   [place]   01/04/2019  1   08/31/2018  Alprazolam 1 MG Tablet  90 00 30 Wa Abo   [id number]   Bat (8258)   0   Comm Ins   [place]   12/06/2018  1   12/06/2018  Alprazolam 1 MG Tablet  90 00 30 Wa Abo   [id number]   Bat (8258)   0   Comm Ins   [place]   10/30/2018  1   10/05/2018  Alprazolam 1 MG Tablet  90 00 30 Wa Abo   [id number]   Bat (8258)   1   Comm Ins   [place]   10/05/2018  1   10/05/2018  Alprazolam 1 MG Tablet  90 00 30 Wa Abo   [id number]   Bat (8258)   0   Comm Ins   [place]   08/24/2018  1   08/24/2018  Alprazolam 1 MG Tablet  90 00 30 Wa Abo   [id number]   Bat (8258)   0   Comm Ins   [place]   07/16/2018  1   07/16/2018  Alprazolam 1 MG Tablet  90 00 30 Wa Abo   [id number]   Bat (8258)   0   Comm Ins   [place]   06/08/2018  1   02/20/2018  Alprazolam 1 MG Tablet  90 00 30 Cy Sie   [id number]   Bat (8258)   2   Comm Ins   [place]   04/19/2018  1   02/20/2018  Alprazolam 1 MG Tablet  90 00 30 Cy Sie   [id number]   Bat (8258)   1   Comm Ins   [place]   03/10/2018  1   02/20/2018  Alprazolam 1 MG Tablet  90 00 30 Cy Sie   [id number]   Bat (8258)   0   Comm Ins   [place]   *Per CDC guidance, the MME conversion factors prescribed or provided as part of the medication-assisted treatment for opioid use disorder should not be used to benchmark against dosage thresholds meant for opioids prescribed for pain  Buprenorphine products have no agreed upon morphine equivalency, and as partial opioid agonists, are not expected to be associated with overdose risk in the same dose-dependent manner as doses for full agonist opioids  MME = morphine milligram equivalents  LME = Lorazepam milligram equivalents  MG = dose in milligrams  Providers  Total Providers: 2   Name [facility] Phone   [place], [place] [address] [facility] [address] [facility] [address]    Pharmacies  Total Pharmacies: 1   Name [facility]  [address] [facility] [phone]          Diagnoses and all orders for this visit:    Breast cancer screening by mammogram  -     Mammo screening bilateral w cad;  Future    Anxiety  -     ALPRAZolam (XANAX) 1 mg tablet; Take 1 tablet (1 mg total) by mouth daily as needed for anxiety    Gastroesophageal reflux disease without esophagitis    Migraine with aura and without status migrainosus, not intractable    BMI 36 0-36 9,adult          There are no Patient Instructions on file for this visit  Return in about 3 months (around 6/2/2020)  Subjective:      Patient ID: Colette Vásquez is a 39 y o  female  Chief Complaint   Patient presents with    Anxiety       HPI    The following portions of the patient's history were reviewed and updated as appropriate: allergies, current medications, past family history, past medical history, past social history, past surgical history and problem list     Review of Systems   Constitutional: Negative for chills and fever  HENT: Negative for trouble swallowing  Eyes: Negative for visual disturbance  Respiratory: Negative for cough and shortness of breath  Cardiovascular: Negative for chest pain, palpitations and leg swelling  Gastrointestinal: Negative for abdominal pain, constipation and diarrhea  Endocrine: Negative for cold intolerance and heat intolerance  Genitourinary: Negative for difficulty urinating and dysuria  Musculoskeletal: Negative for gait problem  Skin: Negative for rash  Neurological: Negative for dizziness, tremors, seizures and headaches  Hematological: Negative for adenopathy  Psychiatric/Behavioral: Negative for behavioral problems           Current Outpatient Medications   Medication Sig Dispense Refill    ALPRAZolam (XANAX) 1 mg tablet Take 1 tablet (1 mg total) by mouth daily as needed for anxiety 30 tablet 0    butalbital-acetaminophen-caffeine (FIORICET,ESGIC) -40 mg per tablet TAKE 1 TABLET BY MOUTH 3 (THREE) TIMES A DAY AS NEEDED FOR MIGRAINE 60 tablet 1    cetirizine (ZyrTEC) 10 mg tablet Take 1 tablet (10 mg total) by mouth daily 30 tablet 2    ergocalciferol (VITAMIN D2) 50,000 units       triamcinolone (KENALOG) 0 5 % cream Apply topically 3 (three) times a day 30 g 0    FLUoxetine (PROzac) 10 mg capsule Take 1 capsule (10 mg total) by mouth daily (Patient not taking: Reported on 11/11/2019) 30 capsule 1     No current facility-administered medications for this visit  Objective:    /74 (BP Location: Left arm, Patient Position: Sitting, Cuff Size: Large)   Pulse 78   Temp 98 2 °F (36 8 °C) (Tympanic)   Resp 16   Ht 5' 2 5" (1 588 m)   Wt 91 5 kg (201 lb 12 8 oz)   SpO2 97%   BMI 36 32 kg/m²        Physical Exam   Constitutional: She is oriented to person, place, and time  She appears well-developed and well-nourished  HENT:   Head: Normocephalic and atraumatic  Eyes: Pupils are equal, round, and reactive to light  EOM are normal    Neck: Normal range of motion  Neck supple  Cardiovascular: Normal rate, regular rhythm and normal heart sounds  Pulmonary/Chest: Effort normal and breath sounds normal    Abdominal: Soft  Bowel sounds are normal    Musculoskeletal: Normal range of motion  She exhibits no edema  Lymphadenopathy:     She has no cervical adenopathy  Neurological: She is alert and oriented to person, place, and time  No cranial nerve deficit  Skin: Skin is warm  Psychiatric: She has a normal mood and affect  Nursing note and vitals reviewed  Dione Gao MD BMI Counseling: Body mass index is 36 32 kg/m²  The BMI is above normal  Nutrition recommendations include reducing portion sizes, decreasing overall calorie intake and 3-5 servings of fruits/vegetables daily  Exercise recommendations include moderate aerobic physical activity for 150 minutes/week

## 2020-04-03 DIAGNOSIS — F41.9 ANXIETY: ICD-10-CM

## 2020-04-09 RX ORDER — ALPRAZOLAM 1 MG/1
1 TABLET ORAL DAILY PRN
Qty: 30 TABLET | Refills: 0 | Status: SHIPPED | OUTPATIENT
Start: 2020-04-09 | End: 2020-04-27 | Stop reason: SDUPTHER

## 2020-04-27 DIAGNOSIS — F41.9 ANXIETY: ICD-10-CM

## 2020-04-27 DIAGNOSIS — G43.909 MIGRAINE WITHOUT STATUS MIGRAINOSUS, NOT INTRACTABLE, UNSPECIFIED MIGRAINE TYPE: ICD-10-CM

## 2020-04-28 RX ORDER — ALPRAZOLAM 1 MG/1
1 TABLET ORAL DAILY PRN
Qty: 30 TABLET | Refills: 0 | Status: SHIPPED | OUTPATIENT
Start: 2020-04-28 | End: 2020-06-13

## 2020-04-28 RX ORDER — BUTALBITAL, ACETAMINOPHEN AND CAFFEINE 50; 325; 40 MG/1; MG/1; MG/1
TABLET ORAL
Qty: 60 TABLET | Refills: 1 | Status: SHIPPED | OUTPATIENT
Start: 2020-04-28 | End: 2020-06-19

## 2020-04-30 ENCOUNTER — TELEPHONE (OUTPATIENT)
Dept: DERMATOLOGY | Facility: CLINIC | Age: 45
End: 2020-04-30

## 2020-06-12 DIAGNOSIS — F41.9 ANXIETY: ICD-10-CM

## 2020-06-12 DIAGNOSIS — E55.9 VITAMIN D INSUFFICIENCY: Primary | ICD-10-CM

## 2020-06-13 RX ORDER — ALPRAZOLAM 1 MG/1
1 TABLET ORAL DAILY PRN
Qty: 30 TABLET | Refills: 0 | Status: SHIPPED | OUTPATIENT
Start: 2020-06-13 | End: 2020-08-11 | Stop reason: SDUPTHER

## 2020-06-13 RX ORDER — ERGOCALCIFEROL 1.25 MG/1
CAPSULE ORAL
Qty: 4 CAPSULE | Refills: 3 | Status: SHIPPED | OUTPATIENT
Start: 2020-06-13 | End: 2020-06-19

## 2020-06-18 DIAGNOSIS — G43.909 MIGRAINE WITHOUT STATUS MIGRAINOSUS, NOT INTRACTABLE, UNSPECIFIED MIGRAINE TYPE: ICD-10-CM

## 2020-06-18 DIAGNOSIS — E55.9 VITAMIN D INSUFFICIENCY: ICD-10-CM

## 2020-06-18 DIAGNOSIS — Z88.9 H/O SEASONAL ALLERGIES: ICD-10-CM

## 2020-06-19 RX ORDER — CETIRIZINE HYDROCHLORIDE 10 MG/1
10 TABLET ORAL DAILY
Qty: 30 TABLET | Refills: 1 | Status: SHIPPED | OUTPATIENT
Start: 2020-06-19 | End: 2020-10-05 | Stop reason: SDUPTHER

## 2020-06-19 RX ORDER — ERGOCALCIFEROL 1.25 MG/1
CAPSULE ORAL
Qty: 4 CAPSULE | Refills: 2 | Status: SHIPPED | OUTPATIENT
Start: 2020-06-19 | End: 2020-10-05 | Stop reason: SDUPTHER

## 2020-06-19 RX ORDER — BUTALBITAL, ACETAMINOPHEN AND CAFFEINE 50; 325; 40 MG/1; MG/1; MG/1
TABLET ORAL
Qty: 60 TABLET | Refills: 1 | Status: SHIPPED | OUTPATIENT
Start: 2020-06-19

## 2020-08-11 DIAGNOSIS — F41.9 ANXIETY: ICD-10-CM

## 2020-08-11 NOTE — TELEPHONE ENCOUNTER
Pt last seen 3/20, requesting refill on Xanax  I called pt and sched med check appt for this Friday  She states she is completely out and is asking if we can call in enough meds to get her to her appt  Request sent to provider for 4 tabs of xanax     MED LIST WONT ALLOW ME TO ORDER REFILL  PMED:   Report Prepared: 2020   Date Range: 2019 - 2020       Download PDF      Download CSV    Mik MEIO     Linked Records  Name  ID Gender Address   Libby Mcclellan 1975 1 female [address] [facility] [address]   Report Criteria  First NameJENNIFER  Last NameVENTRUDO  DOB1975  Summary      Summary  Total Prescriptions   8   Total Private Pay   0   Total Prescribers   1   Total Pharmacies   1    Opioids* (excluding buprenorphine)  Current Qty   0 0   Current MME/day   0 0   30 Day Avg MME/day   0 0    Buprenorphine*  Current Qty   0 0   Current mg/day   0 0   30 Day Avg mg/day   0 0    Prescriptions    Filled  ID  Written  Drug  QTY  Days  Prescriber  Rx #  Pharmacy *  Refills  Daily Dose  Pymt Type      2020  1  2020  ALPRAZOLAM 1 MG TABLET  30 0  30  [facility]  [id number]  [facility] (8258[place]    2020  1  2020  ALPRAZOLAM 1 MG TABLET  30 0  30  [facility] ABO  [id number]  [facility] (8258)  [place]    2020  1  2020  ALPRAZOLAM 1 MG TABLET  30 0  30  [facility]  [id number]  [facility] (8258)  [place]    2020  1  2020  ALPRAZOLAM 1 MG TABLET  30 0  30  [facility] A[facility]  [id number]  [facility] (8258)  0   [place]    2020  1  2020  ALPRAZOLAM 1

## 2020-08-12 RX ORDER — ALPRAZOLAM 1 MG/1
1 TABLET ORAL DAILY PRN
Qty: 30 TABLET | Refills: 0 | Status: SHIPPED | OUTPATIENT
Start: 2020-08-12 | End: 2020-10-05 | Stop reason: SDUPTHER

## 2020-09-29 ENCOUNTER — OFFICE VISIT (OUTPATIENT)
Dept: OBGYN CLINIC | Facility: CLINIC | Age: 45
End: 2020-09-29
Payer: COMMERCIAL

## 2020-09-29 VITALS
BODY MASS INDEX: 35.97 KG/M2 | DIASTOLIC BLOOD PRESSURE: 80 MMHG | SYSTOLIC BLOOD PRESSURE: 124 MMHG | WEIGHT: 203 LBS | HEIGHT: 63 IN

## 2020-09-29 DIAGNOSIS — Z11.8 SPECIAL SCREENING EXAMINATION FOR CHLAMYDIAL DISEASE: ICD-10-CM

## 2020-09-29 DIAGNOSIS — N89.8 VAGINAL DRYNESS: Primary | ICD-10-CM

## 2020-09-29 DIAGNOSIS — N76.0 ACUTE VAGINITIS: ICD-10-CM

## 2020-09-29 DIAGNOSIS — N72 ACUTE CERVICITIS: ICD-10-CM

## 2020-09-29 DIAGNOSIS — Z11.3 SCREENING EXAMINATION FOR STD (SEXUALLY TRANSMITTED DISEASE): ICD-10-CM

## 2020-09-29 DIAGNOSIS — N89.8 VAGINAL ITCHING: ICD-10-CM

## 2020-09-29 DIAGNOSIS — B37.49 GENITAL CANDIDIASIS: ICD-10-CM

## 2020-09-29 PROCEDURE — 99213 OFFICE O/P EST LOW 20 MIN: CPT | Performed by: PHYSICIAN ASSISTANT

## 2020-09-29 NOTE — PROGRESS NOTES
Patient is here with vaginal burning externally and internally  Patient was recently on antibiotics and her and her  used a condom on Monday, she state she usually get irritated from that as well  Patient is having regualr cycles

## 2020-09-29 NOTE — PROGRESS NOTES
Assessment/Plan:    No problem-specific Assessment & Plan notes found for this encounter  There are no diagnoses linked to this encounter  Subjective:      Patient ID: Ky Kathleen is a 39 y o  female  Pt presents for a problem today  She is s/p abx x few days and also used new condoms  She feels dry, itchy and off  Periods are regular  Bowel and bladder are ok  She denies any change in any other products/meds/partners    Cultures done  Change products  Add probiotic  Witch hazel/cortisone prn      The following portions of the patient's history were reviewed and updated as appropriate: allergies, current medications, past family history, past medical history, past social history, past surgical history and problem list     Review of Systems   Constitutional: Negative for chills, fever and unexpected weight change  Gastrointestinal: Negative for abdominal pain, blood in stool, constipation and diarrhea  Genitourinary: Positive for vaginal pain  Negative for vaginal bleeding and vaginal discharge  Objective:      /80   Ht 5' 2 5" (1 588 m)   Wt 92 1 kg (203 lb)   LMP 09/16/2020 (Exact Date)   Breastfeeding No   BMI 36 54 kg/m²          Physical Exam  Vitals signs and nursing note reviewed  Constitutional:       Appearance: She is well-developed  Genitourinary:     Exam position: Supine  Labia:         Right: No rash or lesion  Left: No rash or lesion  Vagina: Normal       Cervix: No cervical motion tenderness, discharge or friability  Lymphadenopathy:      Lower Body: No right inguinal adenopathy  No left inguinal adenopathy

## 2020-10-02 LAB
A VAGINAE DNA VAG NAA+PROBE-LOG#: <3.25
A VAGINAE DNA VAG QL NAA+PROBE: NOT DETECTED
BVAB2 DNA VAG QL NAA+PROBE: NOT DETECTED
C TRACH DNA SPEC QL PROBE+SIG AMP: NOT DETECTED
G VAGINALIS DNA SPEC QL NAA+PROBE: DETECTED
G VAGINALIS DNA VAG NAA+PROBE-LOG#: ABNORMAL
HSV1 DNA SPEC QL NAA+PROBE: NOT DETECTED
HSV2 DNA SPEC QL NAA+PROBE: NOT DETECTED
LACTOBACILLUS DNA VAG NAA+PROBE-LOG#: <3.25
LACTOBACILLUS DNA VAG NAA+PROBE-LOG#: NOT DETECTED
M GENITALIUM DNA SPEC QL NAA+PROBE: NOT DETECTED
MEGA1 DNA VAG QL NAA+PROBE: DETECTED
N GONORRHOEA DNA SPEC QL NAA+PROBE: NOT DETECTED
SL AMB C.ALBICANS BY PCR: NOT DETECTED
SL AMB CANDIDA GENUS: NOT DETECTED
T VAGINALIS RRNA SPEC QL NAA+PROBE: NOT DETECTED
T VAGINALIS RRNA SPEC QL NAA+PROBE: NOT DETECTED

## 2020-10-05 ENCOUNTER — OFFICE VISIT (OUTPATIENT)
Dept: FAMILY MEDICINE CLINIC | Facility: CLINIC | Age: 45
End: 2020-10-05
Payer: COMMERCIAL

## 2020-10-05 VITALS
DIASTOLIC BLOOD PRESSURE: 70 MMHG | BODY MASS INDEX: 35.68 KG/M2 | OXYGEN SATURATION: 98 % | HEART RATE: 74 BPM | WEIGHT: 201.38 LBS | TEMPERATURE: 98.7 F | RESPIRATION RATE: 16 BRPM | HEIGHT: 63 IN | SYSTOLIC BLOOD PRESSURE: 120 MMHG

## 2020-10-05 DIAGNOSIS — F41.9 ANXIETY: ICD-10-CM

## 2020-10-05 DIAGNOSIS — E55.9 VITAMIN D INSUFFICIENCY: ICD-10-CM

## 2020-10-05 DIAGNOSIS — Z88.9 H/O SEASONAL ALLERGIES: ICD-10-CM

## 2020-10-05 DIAGNOSIS — L30.9 DERMATITIS: Primary | ICD-10-CM

## 2020-10-05 DIAGNOSIS — Z00.00 HEALTHCARE MAINTENANCE: ICD-10-CM

## 2020-10-05 DIAGNOSIS — F41.1 GENERALIZED ANXIETY DISORDER: ICD-10-CM

## 2020-10-05 PROCEDURE — 1036F TOBACCO NON-USER: CPT | Performed by: FAMILY MEDICINE

## 2020-10-05 PROCEDURE — 99396 PREV VISIT EST AGE 40-64: CPT | Performed by: FAMILY MEDICINE

## 2020-10-05 PROCEDURE — 3725F SCREEN DEPRESSION PERFORMED: CPT | Performed by: FAMILY MEDICINE

## 2020-10-05 RX ORDER — TRIAMCINOLONE ACETONIDE 5 MG/G
CREAM TOPICAL 3 TIMES DAILY
Qty: 30 G | Refills: 0 | Status: SHIPPED | OUTPATIENT
Start: 2020-10-05 | End: 2021-09-21

## 2020-10-05 RX ORDER — ERGOCALCIFEROL 1.25 MG/1
50000 CAPSULE ORAL WEEKLY
Qty: 4 CAPSULE | Refills: 2 | Status: SHIPPED | OUTPATIENT
Start: 2020-10-05 | End: 2021-03-25 | Stop reason: SDUPTHER

## 2020-10-05 RX ORDER — ALPRAZOLAM 1 MG/1
1 TABLET ORAL DAILY PRN
Qty: 30 TABLET | Refills: 0 | Status: SHIPPED | OUTPATIENT
Start: 2020-10-05 | End: 2020-11-12

## 2020-10-05 RX ORDER — CETIRIZINE HYDROCHLORIDE 10 MG/1
10 TABLET ORAL DAILY
Qty: 30 TABLET | Refills: 1 | Status: SHIPPED | OUTPATIENT
Start: 2020-10-05 | End: 2020-12-05

## 2020-10-05 RX ORDER — MINOCYCLINE HYDROCHLORIDE 100 MG/1
100 CAPSULE ORAL EVERY 12 HOURS SCHEDULED
Qty: 20 CAPSULE | Refills: 0 | Status: SHIPPED | OUTPATIENT
Start: 2020-10-05 | End: 2020-10-15

## 2020-10-09 DIAGNOSIS — N76.0 BV (BACTERIAL VAGINOSIS): Primary | ICD-10-CM

## 2020-10-09 DIAGNOSIS — B96.89 BV (BACTERIAL VAGINOSIS): Primary | ICD-10-CM

## 2020-10-09 RX ORDER — METRONIDAZOLE 7.5 MG/G
1 GEL VAGINAL
Qty: 70 G | Refills: 0 | Status: SHIPPED | OUTPATIENT
Start: 2020-10-09 | End: 2020-10-14

## 2020-10-27 ENCOUNTER — LAB (OUTPATIENT)
Dept: LAB | Facility: IMAGING CENTER | Age: 45
End: 2020-10-27
Payer: COMMERCIAL

## 2020-10-27 DIAGNOSIS — Z00.00 HEALTHCARE MAINTENANCE: ICD-10-CM

## 2020-10-27 DIAGNOSIS — F41.1 GENERALIZED ANXIETY DISORDER: ICD-10-CM

## 2020-10-27 LAB
ALBUMIN SERPL BCP-MCNC: 3.7 G/DL (ref 3.5–5)
ALP SERPL-CCNC: 54 U/L (ref 46–116)
ALT SERPL W P-5'-P-CCNC: 16 U/L (ref 12–78)
ANION GAP SERPL CALCULATED.3IONS-SCNC: 3 MMOL/L (ref 4–13)
AST SERPL W P-5'-P-CCNC: 10 U/L (ref 5–45)
BASOPHILS # BLD AUTO: 0.03 THOUSANDS/ΜL (ref 0–0.1)
BASOPHILS NFR BLD AUTO: 1 % (ref 0–1)
BILIRUB SERPL-MCNC: 0.27 MG/DL (ref 0.2–1)
BUN SERPL-MCNC: 11 MG/DL (ref 5–25)
CALCIUM SERPL-MCNC: 9.1 MG/DL (ref 8.3–10.1)
CHLORIDE SERPL-SCNC: 108 MMOL/L (ref 100–108)
CHOLEST SERPL-MCNC: 178 MG/DL (ref 50–200)
CO2 SERPL-SCNC: 28 MMOL/L (ref 21–32)
CREAT SERPL-MCNC: 0.63 MG/DL (ref 0.6–1.3)
EOSINOPHIL # BLD AUTO: 0.13 THOUSAND/ΜL (ref 0–0.61)
EOSINOPHIL NFR BLD AUTO: 2 % (ref 0–6)
ERYTHROCYTE [DISTWIDTH] IN BLOOD BY AUTOMATED COUNT: 14.3 % (ref 11.6–15.1)
EST. AVERAGE GLUCOSE BLD GHB EST-MCNC: 103 MG/DL
GFR SERPL CREATININE-BSD FRML MDRD: 109 ML/MIN/1.73SQ M
GLUCOSE P FAST SERPL-MCNC: 75 MG/DL (ref 65–99)
HBA1C MFR BLD: 5.2 %
HCT VFR BLD AUTO: 39.5 % (ref 34.8–46.1)
HDLC SERPL-MCNC: 67 MG/DL
HGB BLD-MCNC: 12.3 G/DL (ref 11.5–15.4)
IMM GRANULOCYTES # BLD AUTO: 0.02 THOUSAND/UL (ref 0–0.2)
IMM GRANULOCYTES NFR BLD AUTO: 0 % (ref 0–2)
LDLC SERPL CALC-MCNC: 93 MG/DL (ref 0–100)
LYMPHOCYTES # BLD AUTO: 1.75 THOUSANDS/ΜL (ref 0.6–4.47)
LYMPHOCYTES NFR BLD AUTO: 28 % (ref 14–44)
MCH RBC QN AUTO: 25.2 PG (ref 26.8–34.3)
MCHC RBC AUTO-ENTMCNC: 31.1 G/DL (ref 31.4–37.4)
MCV RBC AUTO: 81 FL (ref 82–98)
MONOCYTES # BLD AUTO: 0.48 THOUSAND/ΜL (ref 0.17–1.22)
MONOCYTES NFR BLD AUTO: 8 % (ref 4–12)
NEUTROPHILS # BLD AUTO: 3.91 THOUSANDS/ΜL (ref 1.85–7.62)
NEUTS SEG NFR BLD AUTO: 61 % (ref 43–75)
NRBC BLD AUTO-RTO: 0 /100 WBCS
PLATELET # BLD AUTO: 233 THOUSANDS/UL (ref 149–390)
PMV BLD AUTO: 12.7 FL (ref 8.9–12.7)
POTASSIUM SERPL-SCNC: 4.3 MMOL/L (ref 3.5–5.3)
PROT SERPL-MCNC: 7.5 G/DL (ref 6.4–8.2)
RBC # BLD AUTO: 4.89 MILLION/UL (ref 3.81–5.12)
SODIUM SERPL-SCNC: 139 MMOL/L (ref 136–145)
T4 FREE SERPL-MCNC: 0.83 NG/DL (ref 0.76–1.46)
TRIGL SERPL-MCNC: 90 MG/DL
TSH SERPL DL<=0.05 MIU/L-ACNC: 4.12 UIU/ML (ref 0.36–3.74)
WBC # BLD AUTO: 6.32 THOUSAND/UL (ref 4.31–10.16)

## 2020-10-27 PROCEDURE — 84443 ASSAY THYROID STIM HORMONE: CPT

## 2020-10-27 PROCEDURE — 85025 COMPLETE CBC W/AUTO DIFF WBC: CPT

## 2020-10-27 PROCEDURE — 80053 COMPREHEN METABOLIC PANEL: CPT

## 2020-10-27 PROCEDURE — 83036 HEMOGLOBIN GLYCOSYLATED A1C: CPT

## 2020-10-27 PROCEDURE — 84439 ASSAY OF FREE THYROXINE: CPT

## 2020-10-27 PROCEDURE — 36415 COLL VENOUS BLD VENIPUNCTURE: CPT

## 2020-10-27 PROCEDURE — 80061 LIPID PANEL: CPT

## 2020-10-29 ENCOUNTER — TELEPHONE (OUTPATIENT)
Dept: FAMILY MEDICINE CLINIC | Facility: CLINIC | Age: 45
End: 2020-10-29

## 2020-11-12 DIAGNOSIS — F41.9 ANXIETY: ICD-10-CM

## 2020-11-12 RX ORDER — ALPRAZOLAM 1 MG/1
1 TABLET ORAL DAILY PRN
Qty: 30 TABLET | Refills: 0 | Status: SHIPPED | OUTPATIENT
Start: 2020-11-12 | End: 2021-03-25 | Stop reason: SDUPTHER

## 2020-12-04 DIAGNOSIS — Z88.9 H/O SEASONAL ALLERGIES: ICD-10-CM

## 2020-12-05 RX ORDER — CETIRIZINE HYDROCHLORIDE 10 MG/1
10 TABLET ORAL DAILY
Qty: 30 TABLET | Refills: 0 | Status: SHIPPED | OUTPATIENT
Start: 2020-12-05 | End: 2021-09-21

## 2021-01-08 ENCOUNTER — TELEPHONE (OUTPATIENT)
Dept: FAMILY MEDICINE CLINIC | Facility: CLINIC | Age: 46
End: 2021-01-08

## 2021-03-10 DIAGNOSIS — Z23 ENCOUNTER FOR IMMUNIZATION: ICD-10-CM

## 2021-03-23 ENCOUNTER — OFFICE VISIT (OUTPATIENT)
Dept: FAMILY MEDICINE CLINIC | Facility: CLINIC | Age: 46
End: 2021-03-23
Payer: COMMERCIAL

## 2021-03-23 VITALS
SYSTOLIC BLOOD PRESSURE: 106 MMHG | OXYGEN SATURATION: 98 % | DIASTOLIC BLOOD PRESSURE: 58 MMHG | RESPIRATION RATE: 16 BRPM | TEMPERATURE: 98 F | BODY MASS INDEX: 35.83 KG/M2 | HEIGHT: 63 IN | HEART RATE: 85 BPM | WEIGHT: 202.2 LBS

## 2021-03-23 DIAGNOSIS — E04.9 GOITER: ICD-10-CM

## 2021-03-23 DIAGNOSIS — Z12.31 BREAST CANCER SCREENING BY MAMMOGRAM: ICD-10-CM

## 2021-03-23 DIAGNOSIS — R13.19 OTHER DYSPHAGIA: ICD-10-CM

## 2021-03-23 DIAGNOSIS — E66.09 CLASS 2 OBESITY DUE TO EXCESS CALORIES WITHOUT SERIOUS COMORBIDITY WITH BODY MASS INDEX (BMI) OF 36.0 TO 36.9 IN ADULT: ICD-10-CM

## 2021-03-23 DIAGNOSIS — K21.9 GASTROESOPHAGEAL REFLUX DISEASE WITHOUT ESOPHAGITIS: Primary | ICD-10-CM

## 2021-03-23 DIAGNOSIS — E78.49 OTHER HYPERLIPIDEMIA: ICD-10-CM

## 2021-03-23 PROBLEM — E66.812 CLASS 2 OBESITY DUE TO EXCESS CALORIES WITHOUT SERIOUS COMORBIDITY WITH BODY MASS INDEX (BMI) OF 36.0 TO 36.9 IN ADULT: Status: ACTIVE | Noted: 2021-03-23

## 2021-03-23 PROCEDURE — 1036F TOBACCO NON-USER: CPT | Performed by: FAMILY MEDICINE

## 2021-03-23 PROCEDURE — 3008F BODY MASS INDEX DOCD: CPT | Performed by: FAMILY MEDICINE

## 2021-03-23 PROCEDURE — 99214 OFFICE O/P EST MOD 30 MIN: CPT | Performed by: FAMILY MEDICINE

## 2021-03-23 RX ORDER — ESOMEPRAZOLE MAGNESIUM 40 MG/1
40 CAPSULE, DELAYED RELEASE ORAL
Qty: 30 CAPSULE | Refills: 1 | Status: SHIPPED | OUTPATIENT
Start: 2021-03-23 | End: 2021-03-26

## 2021-03-23 NOTE — PROGRESS NOTES
Assessment/Plan:    GERD (gastroesophageal reflux disease)  Script sent for Nexium  Referral to GI given  Continue to avoid acidic foods  Other dysphagia  Script for Nexium sent  Referral for GI given  Get thyroid ultrasound    Goiter  Get thyroid US and TSH/T4 level  Breast cancer screening by mammogram  Script for 3D Mammogram sent  Other hyperlipidemia  Controlled, get fasting lipid panel and follow up in 2 months  Class 2 obesity due to excess calories without serious comorbidity with body mass index (BMI) of 36 0 to 36 9 in adult  Diet and exercise discussed  Get blood work and follow up in 2 months  Problem List Items Addressed This Visit        Digestive    GERD (gastroesophageal reflux disease) - Primary     Script sent for Nexium  Referral to GI given  Continue to avoid acidic foods  Relevant Medications    esomeprazole (NexIUM) 40 MG capsule    Other Relevant Orders    Ambulatory referral to Gastroenterology    Ambulatory referral to Gastroenterology    Other dysphagia     Script for Nexium sent  Referral for GI given  Get thyroid ultrasound            Endocrine    Goiter     Get thyroid US and TSH/T4 level  Relevant Orders    US thyroid    TSH, 3rd generation with Free T4 reflex    Comprehensive metabolic panel    CBC and differential       Other    Breast cancer screening by mammogram     Script for 3D Mammogram sent  Relevant Orders    Mammo screening bilateral w cad    Mammo screening bilateral w 3d & cad    Other hyperlipidemia     Controlled, get fasting lipid panel and follow up in 2 months  Relevant Orders    TSH, 3rd generation with Free T4 reflex    Comprehensive metabolic panel    CBC and differential    Lipid Panel with Direct LDL reflex    Class 2 obesity due to excess calories without serious comorbidity with body mass index (BMI) of 36 0 to 36 9 in adult     Diet and exercise discussed  Get blood work and follow up in 2 months  Relevant Orders    TSH, 3rd generation with Free T4 reflex    Insulin, fasting            Subjective:      Patient ID: Luisa Junior is a 55 y o  female  Patient presents with complaint of dysphagia for 1 month  She has had several instances where it feels like solid food gets stuck  She denies dysphagia to liquids  She feels like her neck is swollen and her throat feels strained when she talks too much  She has a history of acid reflux and states her symptoms have improved since cutting out sugar and coffee but does not take any medication for the acid reflux  She denies abdominal pain, nausea or vomiting  The following portions of the patient's history were reviewed and updated as appropriate: allergies, current medications, past family history, past medical history, past social history, past surgical history and problem list     Review of Systems   Constitutional: Negative for chills and fever  HENT: Positive for trouble swallowing  Eyes: Negative for visual disturbance  Respiratory: Negative for cough and shortness of breath  Cardiovascular: Negative for chest pain, palpitations and leg swelling  Gastrointestinal: Negative for abdominal pain, constipation and diarrhea  Endocrine: Negative for cold intolerance and heat intolerance  Genitourinary: Negative for difficulty urinating and dysuria  Musculoskeletal: Negative for gait problem  Skin: Negative for rash  Neurological: Negative for dizziness, tremors, seizures and headaches  Hematological: Negative for adenopathy  Psychiatric/Behavioral: Negative for behavioral problems  The patient is nervous/anxious  Objective:      /58 (BP Location: Left arm, Patient Position: Sitting, Cuff Size: Large)   Pulse 85   Temp 98 °F (36 7 °C) (Tympanic)   Resp 16   Ht 5' 2 5" (1 588 m)   Wt 91 7 kg (202 lb 3 2 oz)   SpO2 98%   BMI 36 39 kg/m²          Physical Exam  Vitals signs and nursing note reviewed  Constitutional:       Appearance: She is well-developed  HENT:      Head: Normocephalic and atraumatic  Eyes:      Pupils: Pupils are equal, round, and reactive to light  Neck:      Musculoskeletal: Normal range of motion and neck supple  Thyroid: Thyromegaly present  Comments: Neck swelling  Cardiovascular:      Rate and Rhythm: Normal rate and regular rhythm  Heart sounds: Normal heart sounds  Pulmonary:      Effort: Pulmonary effort is normal       Breath sounds: Normal breath sounds  Abdominal:      General: Bowel sounds are normal       Palpations: Abdomen is soft  Musculoskeletal: Normal range of motion  Lymphadenopathy:      Cervical: No cervical adenopathy  Skin:     General: Skin is warm  Neurological:      Mental Status: She is alert and oriented to person, place, and time  Cranial Nerves: No cranial nerve deficit

## 2021-03-25 DIAGNOSIS — K21.9 GASTROESOPHAGEAL REFLUX DISEASE WITHOUT ESOPHAGITIS: ICD-10-CM

## 2021-03-25 DIAGNOSIS — E55.9 VITAMIN D INSUFFICIENCY: ICD-10-CM

## 2021-03-25 DIAGNOSIS — F41.9 ANXIETY: ICD-10-CM

## 2021-03-25 NOTE — TELEPHONE ENCOUNTER
Phone call from [facility] she was seen here on 3/23, her Xanax & Vitamin D were never called in to [facility]  Please call in asap

## 2021-03-25 NOTE — TELEPHONE ENCOUNTER
Pt last seen 3/23/21 and I copied past refills it from [facility] web site and placed into chart for provider review and approval    Report Prepared: 2021   Date Range: 2020 - 2021       Download PDF      Download CSV    neyda parmar     Linked Records  Name  ID Gender Address   Daryl Jacinto 1975 1 female [address] [facility] [address]   Report Criteria  First Namejennifer  Last Nameventrudo  DOB1975  Summary      Summary  Total Prescriptions   6   Total Private Pay   0   Total Prescribers   1   Total Pharmacies   1    Opioids* (excluding buprenorphine)  Current Qty   0 0   Current MME/day   0 0   30 Day Avg MME/day   0 0    Buprenorphine*  Current Qty   0 0   Current mg/day   0 0   30 Day Avg mg/day   0 0    Prescriptions    Filled  ID  Written  Drug  QTY  Days  Prescriber  Rx #  Pharmacy *  Refills  Daily Dose  Pymt Type      2020  1  2020  ALPRAZOLAM 1 MG TABLET  30 0  30  [facility]  [id number]  [facility] (8258)  [place]   [facility]  [place]    10/05/2020  1  10/05/2020  ALPRAZOLAM 1 MG TABLET  30 0  30  [facility] A[facility]  [id number]  [facility] (8258)  [place]    2020  1  2020  ALPRAZOLAM 1 MG TABLET  30 0  30  [facility]  [id number]  [facility] (8258)  [place]    2020  1  2020  ALPRAZOLAM 1 MG TABLET  30 0  30  [facility] A[facility]  [id number]  [facility] (8258)  [place]    2020  1  2020  ALPRAZOLAM 1 MG TABLET  30 0  30  [facility] A[facility]  [id number]  [facility] (8258)  [place]    2020  1  2020  ALPRAZOLAM 1 MG TABLET  30 0  30  [facility] A[facility]  [id number]  [facility] (8258)  0   [place]    *Pharmacy is created using a combination of pharmacy name and the last four digits of the pharmacy license number  *Per CDC guidance, the MME conversion factors prescribed or provided as part of medication-assisted treatment for opioid use disorder should not be used to benchmark against dosage thresholds meant for opioids prescribed for pain   Buprenorphine products have no agreed upon morphine equivalency, and as partial opioid agonists, are not expected to be associated with overdose risk in the same dose-dependent manner as doses for full agonist opioids  MME = morphine milligram equivalents  mg = dose in milligrams  Prescribers    Name  Ruthie  [address]  Zip  Phone    Rhoda Kulkarni, [address]  [phone]    [address] [facility]  Zip  Phone    BATH Johnnie Cooler ([id number])  [place] [address]  [phone]    ×   Contested Record Flag    Prescriber Delegate - Unlicensed Disclaimer:  Information from the [facility] is protected health information and any information accessed must be treated as confidential  Any person who unintentionally or intentionally makes an unauthorized disclosure of information from the [address] will be subject to civil and criminal penalties as set forth in the ABC-MAP Act 2014-191, Act of Oct  27, 2014, P L  2919  The information in the [address] may contain errors resulting from the reporting of information received  Additional independent verification of patient profile information with pharmacies and prescribers may sometimes be prudent or necessary  Powered By       Justice Jensen    [address]  All Rights Reserved   Privacy Policy

## 2021-03-26 ENCOUNTER — APPOINTMENT (OUTPATIENT)
Dept: LAB | Facility: IMAGING CENTER | Age: 46
End: 2021-03-26
Payer: COMMERCIAL

## 2021-03-26 ENCOUNTER — HOSPITAL ENCOUNTER (OUTPATIENT)
Dept: RADIOLOGY | Facility: IMAGING CENTER | Age: 46
Discharge: HOME | End: 2021-03-26
Payer: COMMERCIAL

## 2021-03-26 ENCOUNTER — TELEPHONE (OUTPATIENT)
Dept: FAMILY MEDICINE CLINIC | Facility: CLINIC | Age: 46
End: 2021-03-26

## 2021-03-26 DIAGNOSIS — E78.49 OTHER HYPERLIPIDEMIA: ICD-10-CM

## 2021-03-26 DIAGNOSIS — E04.9 GOITER: ICD-10-CM

## 2021-03-26 DIAGNOSIS — E66.09 CLASS 2 OBESITY DUE TO EXCESS CALORIES WITHOUT SERIOUS COMORBIDITY WITH BODY MASS INDEX (BMI) OF 36.0 TO 36.9 IN ADULT: ICD-10-CM

## 2021-03-26 LAB
ALBUMIN SERPL BCP-MCNC: 3.9 G/DL (ref 3.5–5)
ALP SERPL-CCNC: 49 U/L (ref 46–116)
ALT SERPL W P-5'-P-CCNC: 22 U/L (ref 12–78)
ANION GAP SERPL CALCULATED.3IONS-SCNC: 6 MMOL/L (ref 4–13)
AST SERPL W P-5'-P-CCNC: 15 U/L (ref 5–45)
BASOPHILS # BLD AUTO: 0.04 THOUSANDS/ΜL (ref 0–0.1)
BASOPHILS NFR BLD AUTO: 1 % (ref 0–1)
BILIRUB SERPL-MCNC: 0.32 MG/DL (ref 0.2–1)
BUN SERPL-MCNC: 12 MG/DL (ref 5–25)
CALCIUM SERPL-MCNC: 8.9 MG/DL (ref 8.3–10.1)
CHLORIDE SERPL-SCNC: 105 MMOL/L (ref 100–108)
CHOLEST SERPL-MCNC: 187 MG/DL (ref 50–200)
CO2 SERPL-SCNC: 26 MMOL/L (ref 21–32)
CREAT SERPL-MCNC: 0.62 MG/DL (ref 0.6–1.3)
EOSINOPHIL # BLD AUTO: 0.06 THOUSAND/ΜL (ref 0–0.61)
EOSINOPHIL NFR BLD AUTO: 1 % (ref 0–6)
ERYTHROCYTE [DISTWIDTH] IN BLOOD BY AUTOMATED COUNT: 14.5 % (ref 11.6–15.1)
GFR SERPL CREATININE-BSD FRML MDRD: 108 ML/MIN/1.73SQ M
GLUCOSE P FAST SERPL-MCNC: 81 MG/DL (ref 65–99)
HCT VFR BLD AUTO: 40.2 % (ref 34.8–46.1)
HDLC SERPL-MCNC: 55 MG/DL
HGB BLD-MCNC: 11.9 G/DL (ref 11.5–15.4)
IMM GRANULOCYTES # BLD AUTO: 0.01 THOUSAND/UL (ref 0–0.2)
IMM GRANULOCYTES NFR BLD AUTO: 0 % (ref 0–2)
INSULIN SERPL-ACNC: 9.7 MU/L (ref 3–25)
LDLC SERPL CALC-MCNC: 117 MG/DL (ref 0–100)
LYMPHOCYTES # BLD AUTO: 1.49 THOUSANDS/ΜL (ref 0.6–4.47)
LYMPHOCYTES NFR BLD AUTO: 23 % (ref 14–44)
MCH RBC QN AUTO: 23.9 PG (ref 26.8–34.3)
MCHC RBC AUTO-ENTMCNC: 29.6 G/DL (ref 31.4–37.4)
MCV RBC AUTO: 81 FL (ref 82–98)
MONOCYTES # BLD AUTO: 0.38 THOUSAND/ΜL (ref 0.17–1.22)
MONOCYTES NFR BLD AUTO: 6 % (ref 4–12)
NEUTROPHILS # BLD AUTO: 4.43 THOUSANDS/ΜL (ref 1.85–7.62)
NEUTS SEG NFR BLD AUTO: 69 % (ref 43–75)
NRBC BLD AUTO-RTO: 0 /100 WBCS
PLATELET # BLD AUTO: 225 THOUSANDS/UL (ref 149–390)
PMV BLD AUTO: 13 FL (ref 8.9–12.7)
POTASSIUM SERPL-SCNC: 4.3 MMOL/L (ref 3.5–5.3)
PROT SERPL-MCNC: 7.5 G/DL (ref 6.4–8.2)
RBC # BLD AUTO: 4.98 MILLION/UL (ref 3.81–5.12)
SODIUM SERPL-SCNC: 137 MMOL/L (ref 136–145)
TRIGL SERPL-MCNC: 77 MG/DL
TSH SERPL DL<=0.05 MIU/L-ACNC: 2.85 UIU/ML (ref 0.36–3.74)
WBC # BLD AUTO: 6.41 THOUSAND/UL (ref 4.31–10.16)

## 2021-03-26 PROCEDURE — 85025 COMPLETE CBC W/AUTO DIFF WBC: CPT

## 2021-03-26 PROCEDURE — 76536 US EXAM OF HEAD AND NECK: CPT

## 2021-03-26 PROCEDURE — 80061 LIPID PANEL: CPT

## 2021-03-26 PROCEDURE — 83525 ASSAY OF INSULIN: CPT

## 2021-03-26 PROCEDURE — 84443 ASSAY THYROID STIM HORMONE: CPT

## 2021-03-26 PROCEDURE — 36415 COLL VENOUS BLD VENIPUNCTURE: CPT

## 2021-03-26 PROCEDURE — 80053 COMPREHEN METABOLIC PANEL: CPT

## 2021-03-26 RX ORDER — ALPRAZOLAM 1 MG/1
1 TABLET ORAL DAILY PRN
Qty: 30 TABLET | Refills: 0 | Status: SHIPPED | OUTPATIENT
Start: 2021-03-26 | End: 2021-06-29 | Stop reason: SDUPTHER

## 2021-03-26 RX ORDER — ESOMEPRAZOLE MAGNESIUM 40 MG/1
40 CAPSULE, DELAYED RELEASE ORAL
Qty: 30 CAPSULE | Refills: 0 | Status: SHIPPED | OUTPATIENT
Start: 2021-03-26 | End: 2021-09-21

## 2021-03-26 RX ORDER — ERGOCALCIFEROL 1.25 MG/1
50000 CAPSULE ORAL WEEKLY
Qty: 4 CAPSULE | Refills: 2 | Status: SHIPPED | OUTPATIENT
Start: 2021-03-26

## 2021-03-26 NOTE — TELEPHONE ENCOUNTER
----- Message from Perry Langford sent at 3/26/2021 11:57 AM EDT -----  Regarding: Test Results Question  Contact: [phone]  I have a question about US THYROID resulted on 3/26/21   The result only states Nodule does this mean  there is one or no?    Thanks   [employer]

## 2021-03-26 NOTE — TELEPHONE ENCOUNTER
The ultrasound showed very small nodule does not meet the criteria for biopsy but recommended follow-up with thyroid ultrasound in 1 year

## 2021-03-29 ENCOUNTER — TELEPHONE (OUTPATIENT)
Dept: FAMILY MEDICINE CLINIC | Facility: CLINIC | Age: 46
End: 2021-03-29

## 2021-03-29 NOTE — TELEPHONE ENCOUNTER
----- Message from Molinapaulette Jesus Manuel sent at 3/26/2021  5:18 PM EDT -----  Regarding: Test Results Question  Contact: [phone]  I have a question about US THYROID resulted on 3/26/21 at 10:42 AM     Could this be why my throat feels swollen and can this effect the way my thyroid is working? Please call me to discuss in more detail   Thanks

## 2021-03-29 NOTE — TELEPHONE ENCOUNTER
Pt was given results of thyroid ultrasound and agreed to repeat ultrasound in 1 year  She wants to know if the nodule could be effecting her thyroid functioning normally? See below message form her my chart   Thank you

## 2021-03-30 ENCOUNTER — TELEPHONE (OUTPATIENT)
Dept: FAMILY MEDICINE CLINIC | Facility: CLINIC | Age: 46
End: 2021-03-30

## 2021-03-30 NOTE — TELEPHONE ENCOUNTER
----- Message from Harry Garcia MD sent at 3/30/2021  2:09 PM EDT -----  Blood work showed elevated LDL  All the rest of blood work came back stable

## 2021-05-21 ENCOUNTER — DOCUMENTATION (OUTPATIENT)
Dept: GASTROENTEROLOGY | Facility: CLINIC | Age: 46
End: 2021-05-21

## 2021-06-29 DIAGNOSIS — F41.9 ANXIETY: ICD-10-CM

## 2021-06-29 RX ORDER — ALPRAZOLAM 1 MG/1
1 TABLET ORAL DAILY PRN
Qty: 30 TABLET | Refills: 0 | Status: SHIPPED | OUTPATIENT
Start: 2021-06-29 | End: 2021-12-22 | Stop reason: SDUPTHER

## 2021-06-29 NOTE — TELEPHONE ENCOUNTER
PT req refill on xanax 1 mg #30  Pt was last seen on 3/23/21 and I copied past refills into chart from [facility] web site  Sent to provider for approval    Report Prepared: 2021   Date Range: 2020 - 2021       Download PDF      Download CSV    al menchaca     Linked Records  Name  ID Gender Address   Marcelo Cervantes 1987 1 male [address] [place] [address]   Report Criteria  First Nameal  Last Namehartcelio  DOB1987  Summary      Summary  Total Prescriptions   2   Total Private Pay   0   Total Prescribers   2   Total Pharmacies   1    Opioids* (excluding buprenorphine)  Current Qty   0 0   Current MME/day   0 0   30 Day Avg MME/day   0 0    Buprenorphine*  Current Qty   0 0   Current mg/day   0 0   30 Day Avg mg/day   0 0    Prescriptions    Filled  ID  Written  Drug  QTY  Days  Prescriber  Rx #  Pharmacy *  Refills  Daily Dose  Pymt Type      2021  1  2021  LORAZEPAM 0 5 MG TABLET  30 0  30  [facility]  [id number]  [facility] (8057)  [place]    2020  1  2020  LORAZEPAM 0 5 MG TABLET  30 0  30  [facility] ABO  [id number]  [facility] (9260)  0   [place]    *Pharmacy is created using a combination of pharmacy name and the last four digits of the pharmacy license number  *Per CDC guidance, the MME conversion factors prescribed or provided as part of medication-assisted treatment for opioid use disorder should not be used to benchmark against dosage thresholds meant for opioids prescribed for pain  Buprenorphine products have no agreed upon morphine equivalency, and as partial opioid agonists, are not expected to be associated with overdose risk in the same dose-dependent manner as doses for full agonist opioids  MME = morphine milligram equivalents  mg = dose in milligrams     Prescribers    Name  Ruthie [address]  Zip  Phone    Aramis Coom[address]uthie  [address]  [address] [employer]  [address] [address] [facility]  Phone    [facility], DANIEL BAKER  ([id number])  [address]  [address] [phone]    ×   Contested Record Flag    Prescriber Delegate - Unlicensed Disclaimer:  Information from the [address] is protected health information and any information accessed must be treated as confidential  Any person who unintentionally or intentionally makes an unauthorized disclosure of information from the [address] will be subject to civil and criminal penalties as set forth in the ABC-MAP Act 2014-191, Act of Oct  27, 2014, P L  2913  The information in the [address] may contain errors resulting from the reporting of information received   Additional independent verification of patient profile information with pharmacies and prescribers may sometimes be prudent or necessary

## 2021-06-29 NOTE — TELEPHONE ENCOUNTER
Pt req refill on xanax 1 mg  I copied past refills into chart from [facility] web site  Sent to provider for approval    Report Prepared: 2021   Date Range: 2020 - 2021       Download PDF      Download CSV    neyda parmar     Linked Records  Name  ID Gender Address   Libby Mcclellan 1975 1 female [address] [facility] [address]   Report Criteria  First Namejennifer  Last Nameventrudo  DOB1975  Summary      Summary  Total Prescriptions   4   Total Private Pay   0   Total Prescribers   1   Total Pharmacies   1    Opioids* (excluding buprenorphine)  Current Qty   0 0   Current MME/day   0 0   30 Day Avg MME/day   0 0    Buprenorphine*  Current Qty   0 0   Current mg/day   0 0   30 Day Avg mg/day   0 0    Prescriptions    Filled  ID  Written  Drug  QTY  Days  Prescriber  Rx #  Pharmacy *  Refills  Daily Dose  Pymt Type      2021  1  2021  ALPRAZOLAM 1 MG TABLET  30 0  30  [facility] A[facility]  [id number]  [facility] (8258)  [place]    2020  1  2020  ALPRAZOLAM 1 MG TABLET  30 0  30  [facility] ABO  [id number]  [facility] (8258)  0   [place]    10/05/2020  1  10/05/2020  ALPRAZOLAM 1 MG TABLET  30 0  30  [facility] ABO  [id number]  [facility] (8258)  0   [place]    2020  1  2020  ALPRAZOLAM 1 MG TABLET  30 0  30  [facility] ABO  [id number]  [facility] (8258)  0   [place]    *Pharmacy is created using a combination of pharmacy name and the last four digits of the pharmacy license number  *Per CDC guidance, the MME conversion factors prescribed or provided as part of medication-assisted treatment for opioid use disorder should not be used to benchmark against dosage thresholds meant for opioids prescribed for pain  Buprenorphine products have no agreed upon morphine equivalency, and as partial opioid agonists, are not expected to be associated with overdose risk in the same dose-dependent manner as doses for full agonist opioids  MME = morphine milligram equivalents  mg = dose in milligrams  Prescribers    Name  Ruthie  [address]  Zip  Phone    Leticia Fuller, [address]  [phone]    [address] [facility]  Zip  Phone    MARKOS Pan ([id number])  [place] [address]  [phone]    ×   Contested Record Flag    Prescriber Delegate - Unlicensed Disclaimer:  Information from the [facility] is protected health information and any information accessed must be treated as confidential  Any person who unintentionally or intentionally makes an unauthorized disclosure of information from the [address] will be subject to civil and criminal penalties as set forth in the ABC-MAP Act 2014-191, Act of Oct  27, 2014, P L  2916  The information in the [address] may contain errors resulting from the reporting of information received  Additional independent verification of patient profile information with pharmacies and prescribers may sometimes be prudent or necessary

## 2021-07-19 ENCOUNTER — OFFICE VISIT (OUTPATIENT)
Dept: GASTROENTEROLOGY | Facility: CLINIC | Age: 46
End: 2021-07-19
Payer: COMMERCIAL

## 2021-07-19 VITALS
TEMPERATURE: 98.2 F | HEART RATE: 73 BPM | BODY MASS INDEX: 35.44 KG/M2 | DIASTOLIC BLOOD PRESSURE: 72 MMHG | HEIGHT: 63 IN | SYSTOLIC BLOOD PRESSURE: 116 MMHG | WEIGHT: 200 LBS

## 2021-07-19 DIAGNOSIS — R13.19 ESOPHAGEAL DYSPHAGIA: Primary | ICD-10-CM

## 2021-07-19 DIAGNOSIS — Z12.11 COLON CANCER SCREENING: ICD-10-CM

## 2021-07-19 DIAGNOSIS — K59.04 CHRONIC IDIOPATHIC CONSTIPATION: ICD-10-CM

## 2021-07-19 DIAGNOSIS — K21.9 GASTROESOPHAGEAL REFLUX DISEASE WITHOUT ESOPHAGITIS: ICD-10-CM

## 2021-07-19 PROCEDURE — 1036F TOBACCO NON-USER: CPT | Performed by: INTERNAL MEDICINE

## 2021-07-19 PROCEDURE — 3008F BODY MASS INDEX DOCD: CPT | Performed by: INTERNAL MEDICINE

## 2021-07-19 PROCEDURE — 99244 OFF/OP CNSLTJ NEW/EST MOD 40: CPT | Performed by: INTERNAL MEDICINE

## 2021-07-19 RX ORDER — POLYETHYLENE GLYCOL 3350 17 G/17G
POWDER, FOR SOLUTION ORAL
Qty: 238 G | Refills: 0 | Status: SHIPPED | OUTPATIENT
Start: 2021-07-19 | End: 2021-09-21 | Stop reason: HOSPADM

## 2021-07-19 NOTE — PATIENT INSTRUCTIONS
For your bowel habits, as we discussed during today's visit,  - I would recommend starting psyllium, fiber supplementation  This can be done with use of Metamucil or Benefiber fiber, which can be purchased over-the-counter  I would recommend starting slow with 1 tsp mixed into a large glass of water, once a day, and increasing to goal of 1 tbsp mixed into a large glass of water per day    - this helps with both diarrhea and constipation, helping to bulk the stool, and should not cause constipation or diarrhea, but treat both  - the only side effect of this can be bloating, if this occurs, cut down on the dose, and increase your water intake        EGD/COLON scheduled on 9/21 at [facility] with Benji Wyatt gave pt verbal instructions/paper work given  Prep-Miralax/Dulcolax

## 2021-07-19 NOTE — PROGRESS NOTES
[facility] - Outpatient Consultation  Nick Jones 55 y o  female MRN: [id number]  Encounter: [id number]      PCP: Huma Esposito MD  Referring: Huma Esposito MD  [address]  [address]:      1  Esophageal dysphagia  2  Gastroesophageal reflux disease without esophagitis  Discussed anti-reflux measures, and offered handout detailing, including weight loss, avoidance of lying down after meals, recognize/avoid trigger foods, and elevating the head of bed  Recommended as needed pepcid  She does not want to take nexium daily at this time, pending EGD findings  - EGD; Future, evaluate for suspected schatzki's ring vs erosive esophagitis vs eosinophilic esophagitis vs other    3  Chronic idiopathic constipation  - recommend psyllium fiber, instructions given    4  Colon cancer screening  Average risk, index screening colonoscopy  - Colonoscopy; Future  - polyethylene glycol (GLYCOLAX) 17 GM/SCOOP powder; At 5pm take 5mgx2 dulcolax  At 6pm mix 238 g miralax in 64oz gatorade  Drink 8oz glass every 5 mins until 32oz finished  Drink remaining as rec  Dispense: 238 g; Refill: 0    ______________________________________________________________________    CC:  Chief Complaint   Patient presents with    GERD     reflux     Dysphagia     tightness in her throat and causes issues with swallowing food        HPI:      Patient is a 51-year-old female referred for GERD, also with constipation, dysphagia  She has migraine headaches, anxiety/major depression, chronic fatigue, restless leg syndrome, HLD, vitamin-D deficiency, obesity with BMI of 36  She complains of intermittent solid food dysphagia x6-8 months  Symptoms are associated with gagging , painless regurgitation  She has experienced only 6-8 episodes over the last several months  She drinks liquids, and feels that this helps food to pass    She denies history of food impaction  She denies liquid dysphagia  She reports longstanding reflux, with heartburn symptoms which are intermittent  These have been exacerbated by antacids in the past   She does note relief when she cut out sugar in baked goods  She has daily bowel movements, but is concerned about rectal prolapse following history of vaginal births  She has straining episodes 2 times per week, with associated hemorrhoids and bright red blood per rectum on her toilet paper  She has never previously had a colonoscopy  She is s/p laparoscopic appendectomy  REVIEW OF SYSTEMS:    CONSTITUTIONAL: Denies any fever, chills, rigors, and weight loss  HEENT: No earache or tinnitus  Denies hearing loss or visual disturbances  CARDIOVASCULAR: No chest pain or palpitations  RESPIRATORY: Denies any cough, hemoptysis, shortness of breath or dyspnea on exertion  GASTROINTESTINAL: As noted in the History of Present Illness  GENITOURINARY: No problems with urination  Denies any hematuria or dysuria  NEUROLOGIC: No dizziness or vertigo, denies headaches  MUSCULOSKELETAL: Denies any muscle or joint pain  SKIN: Denies skin rashes or itching  ENDOCRINE: Denies excessive thirst  Denies intolerance to heat or cold  PSYCHOSOCIAL: ++depression or anxiety  Denies any recent memory loss         Historical Information   Past Medical History:   Diagnosis Date    Anxiety     GERD (gastroesophageal reflux disease)     occ    Hemorrhoids     Migraine      Past Surgical History:   Procedure Laterality Date    APPENDECTOMY  2008    UPPER GASTROINTESTINAL ENDOSCOPY      VARICOSE VEIN SURGERY Left 2012     Social History   Social History     Substance and Sexual Activity   Alcohol Use Yes    Comment: occasionally     Social History     Substance and Sexual Activity   Drug Use No     Social History     Tobacco Use   Smoking Status Never Smoker   Smokeless Tobacco Never Used   Tobacco Comment    no passive smoke exposure     Family History   Problem Relation Age of Onset    No Known Problems Mother     No Known Problems Father     Uterine cancer Paternal Grandmother        Meds/Allergies       Current Outpatient Medications:     ALPRAZolam (XANAX) 1 mg tablet    butalbital-acetaminophen-caffeine (FIORICET,ESGIC) -40 mg per tablet    cetirizine (ZyrTEC) 10 mg tablet    ergocalciferol (VITAMIN D2) 50,000 units    esomeprazole (NexIUM) 40 MG capsule    Probiotic Product (PROBIOTIC-10 PO)    clindamycin (CLEOCIN T) 1 % lotion    FLUoxetine (PROzac) 10 mg capsule    triamcinolone (KENALOG) 0 5 % cream    No Known Allergies        Objective     Blood pressure 116/72, pulse 73, temperature 98 2 °F (36 8 °C), temperature source Tympanic, height 5' 2 5" (1 588 m), weight 90 7 kg (200 lb), not currently breastfeeding  Body mass index is 36 kg/m²  PHYSICAL EXAM:      General Appearance:   Alert, cooperative, no distress   HEENT:   Normocephalic, atraumatic, anicteric  Neck:  Supple, symmetrical, trachea midline   Lungs:   Clear to auscultation bilaterally; no rales, rhonchi or wheezing; respirations unlabored    Heart[de-identified]   Regular rate and rhythm; no murmur, rub, or gallop     Abdomen:   Soft, non-tender, non-distended; normal bowel sounds; no masses, no organomegaly    Genitalia:   Deferred    Rectal:   Deferred    Extremities:  No cyanosis, clubbing or edema    Pulses:  2+ and symmetric    Skin:  No jaundice, rashes, or lesions    Lymph nodes:  No palpable cervical lymphadenopathy        Lab Results:     Lab Results   Component Value Date    WBC 6 41 03/26/2021    HGB 11 9 03/26/2021    HCT 40 2 03/26/2021    MCV 81 (L) 03/26/2021     03/26/2021       Lab Results   Component Value Date    K 4 3 03/26/2021     03/26/2021    CO2 26 03/26/2021    BUN 12 03/26/2021    CREATININE 0 62 03/26/2021    GLUF 81 03/26/2021    CALCIUM 8 9 03/26/2021    AST 15 03/26/2021    ALT 22 03/26/2021    ALKPHOS 49 03/26/2021    EGFR 108 03/26/2021       No results found for: INR, PROTIME      Radiology Results:   No results found  Portions of the record may have been created with voice recognition software  Occasional wrong word or "sound a like" substitutions may have occurred due to the inherent limitations of voice recognition software  Read the chart carefully and recognize, using context, where substitutions have occurred

## 2021-08-05 ENCOUNTER — TELEPHONE (OUTPATIENT)
Dept: FAMILY MEDICINE CLINIC | Facility: CLINIC | Age: 46
End: 2021-08-05

## 2021-08-05 DIAGNOSIS — Z88.9 H/O SEASONAL ALLERGIES: Primary | ICD-10-CM

## 2021-08-05 RX ORDER — LORATADINE 10 MG/1
10 TABLET ORAL DAILY
Qty: 30 TABLET | Refills: 3 | Status: SHIPPED | OUTPATIENT
Start: 2021-08-05

## 2021-08-05 NOTE — TELEPHONE ENCOUNTER
Pt states she is taking Zyrtec at night and is still sleepy the next day  She is asking if we can sent a non-drowsey claritin or allegra to [facility] for her , her insurance will cover it  Claritin request sent to provider

## 2021-08-05 NOTE — TELEPHONE ENCOUNTER
Message from patient yesterday questioning the allergy  Medication she is taking and asking what she can switch to    Last visit in March 2021

## 2021-09-07 ENCOUNTER — ANESTHESIA EVENT (OUTPATIENT)
Dept: ANESTHESIOLOGY | Facility: HOSPITAL | Age: 46
End: 2021-09-07

## 2021-09-07 ENCOUNTER — ANESTHESIA (OUTPATIENT)
Dept: ANESTHESIOLOGY | Facility: HOSPITAL | Age: 46
End: 2021-09-07

## 2021-09-17 ENCOUNTER — TELEPHONE (OUTPATIENT)
Dept: GASTROENTEROLOGY | Facility: AMBULARY SURGERY CENTER | Age: 46
End: 2021-09-17

## 2021-09-17 NOTE — TELEPHONE ENCOUNTER
Patients GI provider:  Dr Fariha Murillo     Number to return call: [phone]     Reason for call: Pt calling requesting for a call from  to review prep instructions and time     Scheduled procedure/appointment date if applicable: Apt/procedure 9/21/21

## 2021-09-20 ENCOUNTER — TELEPHONE (OUTPATIENT)
Dept: GASTROENTEROLOGY | Facility: AMBULARY SURGERY CENTER | Age: 46
End: 2021-09-20

## 2021-09-21 ENCOUNTER — ANESTHESIA EVENT (OUTPATIENT)
Dept: GASTROENTEROLOGY | Facility: AMBULARY SURGERY CENTER | Age: 46
End: 2021-09-21

## 2021-09-21 ENCOUNTER — HOSPITAL ENCOUNTER (OUTPATIENT)
Dept: GASTROENTEROLOGY | Facility: AMBULARY SURGERY CENTER | Age: 46
Setting detail: OUTPATIENT SURGERY
Discharge: HOME | End: 2021-09-21
Attending: INTERNAL MEDICINE | Admitting: INTERNAL MEDICINE
Payer: COMMERCIAL

## 2021-09-21 ENCOUNTER — ANESTHESIA (OUTPATIENT)
Dept: GASTROENTEROLOGY | Facility: AMBULARY SURGERY CENTER | Age: 46
End: 2021-09-21

## 2021-09-21 VITALS
BODY MASS INDEX: 36.44 KG/M2 | DIASTOLIC BLOOD PRESSURE: 58 MMHG | OXYGEN SATURATION: 99 % | HEIGHT: 62 IN | SYSTOLIC BLOOD PRESSURE: 98 MMHG | HEART RATE: 62 BPM | RESPIRATION RATE: 13 BRPM | TEMPERATURE: 97.5 F | WEIGHT: 198 LBS

## 2021-09-21 DIAGNOSIS — Z12.11 COLON CANCER SCREENING: ICD-10-CM

## 2021-09-21 DIAGNOSIS — K21.00 GASTROESOPHAGEAL REFLUX DISEASE WITH ESOPHAGITIS WITHOUT HEMORRHAGE: Primary | ICD-10-CM

## 2021-09-21 DIAGNOSIS — K21.9 GASTROESOPHAGEAL REFLUX DISEASE WITHOUT ESOPHAGITIS: ICD-10-CM

## 2021-09-21 DIAGNOSIS — R13.19 ESOPHAGEAL DYSPHAGIA: ICD-10-CM

## 2021-09-21 PROCEDURE — 88305 TISSUE EXAM BY PATHOLOGIST: CPT | Performed by: PATHOLOGY

## 2021-09-21 PROCEDURE — 43239 EGD BIOPSY SINGLE/MULTIPLE: CPT | Performed by: INTERNAL MEDICINE

## 2021-09-21 PROCEDURE — 45378 DIAGNOSTIC COLONOSCOPY: CPT | Performed by: INTERNAL MEDICINE

## 2021-09-21 PROCEDURE — C1726 CATH, BAL DIL, NON-VASCULAR: HCPCS

## 2021-09-21 RX ORDER — MULTIVIT WITH MINERALS/LUTEIN
1000 TABLET ORAL DAILY
COMMUNITY

## 2021-09-21 RX ORDER — OMEPRAZOLE 40 MG/1
40 CAPSULE, DELAYED RELEASE ORAL 2 TIMES DAILY
Qty: 60 CAPSULE | Refills: 3 | Status: SHIPPED | OUTPATIENT
Start: 2021-09-21

## 2021-09-21 RX ORDER — PROPOFOL 10 MG/ML
INJECTION, EMULSION INTRAVENOUS AS NEEDED
Status: DISCONTINUED | OUTPATIENT
Start: 2021-09-21 | End: 2021-09-21

## 2021-09-21 RX ORDER — SODIUM CHLORIDE, SODIUM LACTATE, POTASSIUM CHLORIDE, CALCIUM CHLORIDE 600; 310; 30; 20 MG/100ML; MG/100ML; MG/100ML; MG/100ML
INJECTION, SOLUTION INTRAVENOUS CONTINUOUS PRN
Status: DISCONTINUED | OUTPATIENT
Start: 2021-09-21 | End: 2021-09-21

## 2021-09-21 RX ORDER — PROPOFOL 10 MG/ML
INJECTION, EMULSION INTRAVENOUS CONTINUOUS PRN
Status: DISCONTINUED | OUTPATIENT
Start: 2021-09-21 | End: 2021-09-21

## 2021-09-21 RX ADMIN — PROPOFOL 30 MG: 10 INJECTION, EMULSION INTRAVENOUS at 11:15

## 2021-09-21 RX ADMIN — PROPOFOL 60 MG: 10 INJECTION, EMULSION INTRAVENOUS at 11:08

## 2021-09-21 RX ADMIN — PROPOFOL 40 MG: 10 INJECTION, EMULSION INTRAVENOUS at 11:13

## 2021-09-21 RX ADMIN — PROPOFOL 30 MG: 10 INJECTION, EMULSION INTRAVENOUS at 11:21

## 2021-09-21 RX ADMIN — PROPOFOL 40 MG: 10 INJECTION, EMULSION INTRAVENOUS at 11:18

## 2021-09-21 RX ADMIN — SODIUM CHLORIDE, SODIUM LACTATE, POTASSIUM CHLORIDE, AND CALCIUM CHLORIDE: .6; .31; .03; .02 INJECTION, SOLUTION INTRAVENOUS at 11:05

## 2021-09-21 RX ADMIN — PROPOFOL 30 MG: 10 INJECTION, EMULSION INTRAVENOUS at 11:19

## 2021-09-21 RX ADMIN — PROPOFOL 60 MCG/KG/MIN: 10 INJECTION, EMULSION INTRAVENOUS at 11:22

## 2021-09-21 RX ADMIN — PROPOFOL 50 MG: 10 INJECTION, EMULSION INTRAVENOUS at 11:09

## 2021-09-21 RX ADMIN — PROPOFOL 40 MG: 10 INJECTION, EMULSION INTRAVENOUS at 11:11

## 2021-09-21 NOTE — DISCHARGE INSTRUCTIONS
Upper Endoscopy and Colonoscopy   WHAT YOU NEED TO KNOW:   An upper endoscopy is also called an upper gastrointestinal (GI) endoscopy, or an esophagogastroduodenoscopy (EGD)  It is a procedure to examine the inside of your esophagus, stomach, and duodenum (first part of the small intestine) with a scope  You may feel bloated, gassy, or have some abdominal discomfort after your procedure  Your throat may be sore for 24 to 36 hours  You may burp or pass gas from air that is still inside your body  A colonoscopy is a procedure to examine the inside of your colon (intestine) with a scope  Polyps or tissue growths may have been removed during your colonoscopy  It is normal to feel bloated and to have some abdominal discomfort  You should be passing gas  If you have hemorrhoids or you had polyps removed, you may have a small amount of bleeding  DISCHARGE INSTRUCTIONS:   Seek care immediately if:   · You have sudden, severe abdominal pain  · You have problems swallowing  · You have a large amount of black, sticky bowel movements or blood in your bowel movements  · You have sudden trouble breathing  · You feel weak, lightheaded, or faint or your heart beats faster than normal for you  Contact your healthcare provider if:   · You have a fever and chills  · You have nausea or are vomiting  · Your abdomen is bloated or feels full and hard  · You have abdominal pain  · You have a large amount of black, sticky bowel movements or blood in your bowel movements  · You have not had a bowel movement for 3 days after your procedure  · You have rash or hives  · You have questions or concerns about your procedure  Activity:   ·       Do not lift, strain, or run for 24 hours after your procedure  ·       Rest after your procedure  You have been given medicine to relax you  Do not drive or make important decisions until the day after your procedure   Return to your normal activity as directed  ·       Relieve gas and discomfort from bloating by lying on your right side with a heating pad on your abdomen  You may need to take short walks to help the gas move out  Eat small meals until bloating is relieved  Follow up with your healthcare provider as directed: Write down your questions so you remember to ask them during your visits  If you take a blood thinner, please review the specific instructions from your endoscopist about when you should resume it  These can be found in the Recommendation and Your Medication list sections of this After Visit Summary  Hiatal Hernia   WHAT YOU NEED TO KNOW:   A hiatal hernia is a condition that causes part of your stomach to bulge through the hiatus (small opening) in your diaphragm  The part of the stomach may move up and down, or it may get trapped above the diaphragm  DISCHARGE INSTRUCTIONS:   Seek care immediately if:   · You have severe abdominal pain  · You try to vomit but nothing comes out (retching)  · You have severe chest pain and sudden trouble breathing  · Your bowel movements are black or bloody  · Your vomit looks like coffee grounds or has blood in it  Contact your healthcare provider if:   · Your symptoms are getting worse  · You have nausea, and you are vomiting  · You are losing weight without trying  · You have questions or concerns about your condition or care  Medicines:   · Medicines  may be given to relieve heartburn symptoms  These medicines help to decrease or block stomach acid  You may also be given medicines that help to tighten the esophageal sphincter  · Take your medicine as directed  Contact your healthcare provider if you think your medicine is not helping or if you have side effects  Tell him or her if you are allergic to any medicine  Keep a list of the medicines, vitamins, and herbs you take   Include the amounts, and when and why you take them  Bring the list or the pill bottles to follow-up visits  Carry your medicine list with you in case of an emergency  Follow up with your healthcare provider as directed:  Write down your questions so you remember to ask them during your visits  Self care:   · Avoid foods that make your symptoms worse  These may include spicy foods, fruit juices, alcohol, caffeine, chocolate, and mint  · Eat several small meals during the day  Small meals give your stomach less food to digest     · Avoid lying down and bending forward after you eat  Do not eat meals 2 to 3 hours before bedtime  This decreases your risk for reflux  · Maintain a healthy weight  If you are overweight, weight loss may help relieve your symptoms  · Sleep with your head elevated  at least 6 inches  · Do not smoke  Smoking can increase your symptoms of heartburn  © Copyright [employer] 2021 Information is for End User's use only and may not be sold, redistributed or otherwise used for commercial purposes  All illustrations and images included in CareNotes® are the copyrighted property of A D A M , Inc  or [address] [place] [address]  The above information is an  only  It is not intended as medical advice for individual conditions or treatments  Talk to your doctor, nurse or pharmacist before following any medical regimen to see if it is safe and effective for you  Gastritis   WHAT YOU NEED TO KNOW:   Gastritis is inflammation or irritation of the lining of your stomach  DISCHARGE INSTRUCTIONS:   Call 911 for any of the following:   · You develop chest pain or shortness of breath  Seek care immediately if:   · You vomit blood  · You have black or bloody bowel movements  · You have severe stomach or back pain  Contact your healthcare provider if:   · You have a fever  · You have new or worsening symptoms, even after treatment      · You have questions or concerns about your condition or care  Medicines:   · Medicines  may be given to help treat a bacterial infection or decrease stomach acid  · Take your medicine as directed  Contact your healthcare provider if you think your medicine is not helping or if you have side effects  Tell him or her if you are allergic to any medicine  Keep a list of the medicines, vitamins, and herbs you take  Include the amounts, and when and why you take them  Bring the list or the pill bottles to follow-up visits  Carry your medicine list with you in case of an emergency  Manage or prevent gastritis:   · Do not smoke  Nicotine and other chemicals in cigarettes and cigars can make your symptoms worse and cause lung damage  Ask your healthcare provider for information if you currently smoke and need help to quit  E-cigarettes or smokeless tobacco still contain nicotine  Talk to your healthcare provider before you use these products  · Do not drink alcohol  Alcohol can prevent healing and make your gastritis worse  Talk to your healthcare provider if you need help to stop drinking  · Do not take NSAIDs or aspirin unless directed  These and similar medicines can cause irritation  If your healthcare provider says it is okay to take NSAIDs, take them with food  · Do not eat foods that cause irritation  Foods such as oranges and salsa can cause burning or pain  Eat a variety of healthy foods  Examples include fruits (not citrus), vegetables, low-fat dairy products, beans, whole-grain breads, and lean meats and fish  Try to eat small meals, and drink water with your meals  Do not eat for at least 3 hours before you go to bed  · Find ways to relax and decrease stress  Stress can increase stomach acid and make gastritis worse  Activities such as yoga, meditation, or listening to music can help you relax  Spend time with friends, or do things you enjoy  Follow up with your healthcare provider as directed:   You may need ongoing tests or treatment, or referral to a gastroenterologist  Write down your questions so you remember to ask them during your visits  © Copyright [employer] 2021 Information is for End User's use only and may not be sold, redistributed or otherwise used for commercial purposes  All illustrations and images included in CareNotes® are the copyrighted property of [employer]  or David Orosco  The above information is an  only  It is not intended as medical advice for individual conditions or treatments  Talk to your doctor, nurse or pharmacist before following any medical regimen to see if it is safe and effective for you  Esophageal Dilation   WHAT YOU NEED TO KNOW:   Esophageal dilation is a procedure to widen a narrow part of your esophagus  Your healthcare provider will use a dilator (inflatable balloon or another tool that expands) to make the area wider  He or she may also do an endoscopy before or during your esophageal dilation  During an endoscopy, your healthcare provider will use a scope to see inside your esophagus  DISCHARGE INSTRUCTIONS:   Call your local emergency number (911 in the [address] [place] [address],[address]) if:   · You vomit blood  · You have a fast heartbeat, chest pain, or sudden trouble breathing  · Your abdomen suddenly becomes tender and hard  Call your doctor if:   · You are not able to swallow any food  · You have a fever  · You feel very full or bloated  · You have nausea or are vomiting  · You have questions or concerns about your condition or care  Medicines:   · Medicines  may be given to decrease stomach acid that can irritate your esophagus  · Take your medicine as directed  Contact your healthcare provider if you think your medicine is not helping or if you have side effects  Tell him or her if you are allergic to any medicine  Keep a list of the medicines, vitamins, and herbs you take  Include the amounts, and when and why you take them   Bring the list or the pill bottles to follow-up visits  Carry your medicine list with you in case of an emergency  Nutrition:  Your healthcare provider will tell you how long to wait after the procedure before you eat or drink anything  You may need to wait until any numbness in your throat is gone  When it is okay to eat, chew your food well  Eat soft foods if you still have problems swallowing  Soft foods include applesauce, bananas, cooked cereal, cottage cheese, eggs, pudding, and yogurt  Follow up with your healthcare provider as directed:  Write down your questions so you remember to ask them during your visits  © Copyright [employer] 2021 Information is for End User's use only and may not be sold, redistributed or otherwise used for commercial purposes  All illustrations and images included in CareNotes® are the copyrighted property of A D A M , Inc  or David Lagos   The above information is an  only  It is not intended as medical advice for individual conditions or treatments  Talk to your doctor, nurse or pharmacist before following any medical regimen to see if it is safe and effective for you  Esophageal Stricture   WHAT YOU NEED TO KNOW:   Esophageal stricture is a narrowing of your esophagus  Inflammation or damage to your esophagus may cause scar tissue that leads to narrowing  DISCHARGE INSTRUCTIONS:   Medicines:   · Medicines  may be given to decrease stomach acid  · Take your medicine as directed  Contact your healthcare provider if you think your medicine is not helping or if you have side effects  Tell him if you are allergic to any medicine  Keep a list of the medicines, vitamins, and herbs you take  Include the amounts, and when and why you take them  Bring the list or the pill bottles to follow-up visits  Carry your medicine list with you in case of an emergency  Follow up with your healthcare provider as directed: You may need to return for more tests   Write down your questions so you remember to ask them during your visits  Nutrition:  You may not be able to eat solid foods for a period of time  You may be allowed to drink water, broth, apple juice, or lemon-lime soft drinks  You may also suck on ice chips or eat gelatin  As you improve, you may be given soft foods to eat or thickened liquids to drink  You may return to eating normal foods as your swallowing gets better  Ask for more information about the type of foods you should eat  Rest as needed:  Slowly start to do more each day  Return to your daily activities as directed  Contact your healthcare provider if:   · You have a fever  · You are vomiting and cannot keep any food or liquids down  · You feel very full and cannot burp or vomit  · You have pain that does not decrease even after you take medicine  · Your symptoms get worse  · You have questions or concerns about your condition or care  Seek care immediately or call 911 if:   · You have severe chest pain and sudden trouble breathing  · Your bowel movements are black, bloody, or tarry-looking  · Your vomit looks like coffee grounds or has blood in it  © Copyright [employer] 2021 Information is for End User's use only and may not be sold, redistributed or otherwise used for commercial purposes  All illustrations and images included in CareNotes® are the copyrighted property of A D A M , Inc  or [facility] Jeremiah Lagos   The above information is an  only  It is not intended as medical advice for individual conditions or treatments  Talk to your doctor, nurse or pharmacist before following any medical regimen to see if it is safe and effective for you  Hemorrhoids   WHAT YOU NEED TO KNOW:   Hemorrhoids are swollen blood vessels inside your rectum (internal hemorrhoids) or on your anus (external hemorrhoids)  Sometimes a hemorrhoid may prolapse  This means it extends out of your anus    DISCHARGE INSTRUCTIONS:   Seek care immediately if:   · You have severe pain in your rectum or around your anus  · You have severe pain in your abdomen and you are vomiting  · You have bleeding from your anus that soaks through your underwear  Contact your healthcare provider if:   · You have frequent and painful bowel movements  · Your hemorrhoid looks or feels more swollen than usual      · You do not have a bowel movement for 2 days or more  · You see or feel tissue coming through your anus  · You have questions or concerns about your condition or care  Medicines: You may  need any of the following:  · Medicine  may be given to decrease pain, swelling, and itching  The medicine may come as a pad, cream, or ointment  · Stool softeners  help treat or prevent constipation  · NSAIDs , such as ibuprofen, help decrease swelling, pain, and fever  NSAIDs can cause stomach bleeding or kidney problems in certain people  If you take blood thinner medicine, always ask your healthcare provider if NSAIDs are safe for you  Always read the medicine label and follow directions  · Take your medicine as directed  Contact your healthcare provider if you think your medicine is not helping or if you have side effects  Tell him or her if you are allergic to any medicine  Keep a list of the medicines, vitamins, and herbs you take  Include the amounts, and when and why you take them  Bring the list or the pill bottles to follow-up visits  Carry your medicine list with you in case of an emergency  Manage your symptoms:   · Apply ice on your anus for 15 to 20 minutes every hour or as directed  Use an ice pack, or put crushed ice in a plastic bag  Cover it with a towel before you apply it to your anus  Ice helps prevent tissue damage and decreases swelling and pain  · Take a sitz bath  Fill a bathtub with 4 to 6 inches of warm water  You may also use a sitz bath pan that fits inside a toilet bowl   Sit in the sitz bath for 15 minutes  Do this 3 times a day, and after each bowel movement  The warm water can help decrease pain and swelling  · Keep your anal area clean  Gently wash the area with warm water daily  Soap may irritate the area  After a bowel movement, wipe with moist towelettes or wet toilet paper  Dry toilet paper can irritate the area  Prevent hemorrhoids:   · Do not strain to have a bowel movement  Do not sit on the toilet too long  These actions can increase pressure on the tissues in your rectum and anus  · Drink plenty of liquids  Liquids can help prevent constipation  Ask how much liquid to drink each day and which liquids are best for you  · Eat a variety of high-fiber foods  Examples include fruits, vegetables, and whole grains  Ask your healthcare provider how much fiber you need each day  You may need to take a fiber supplement  · Exercise as directed  Exercise, such as walking, may make it easier to have a bowel movement  Ask your healthcare provider to help you create an exercise plan  · Do not have anal sex  Anal sex can weaken the skin around your rectum and anus  · Avoid heavy lifting  This can cause straining and increase your risk for another hemorrhoid  Follow up with your healthcare provider as directed:  Write down your questions so you remember to ask them during your visits  © Copyright [employer] 2021 Information is for End User's use only and may not be sold, redistributed or otherwise used for commercial purposes  All illustrations and images included in CareNotes® are the copyrighted property of A [employer] M [employer] Inc  or David Lagos   The above information is an  only  It is not intended as medical advice for individual conditions or treatments  Talk to your doctor, nurse or pharmacist before following any medical regimen to see if it is safe and effective for you

## 2021-09-21 NOTE — H&P
History and Physical - [facility]  Mimi Brady 55 y o  female MRN: [id number]                  HPI: Mimi Brady is a 55y o  year old female who presents for dysphagia, colon cancer screening      REVIEW OF SYSTEMS: Per the HPI, and otherwise unremarkable      Historical Information   Past Medical History:   Diagnosis Date    Anxiety     GERD (gastroesophageal reflux disease)     occ    Hemorrhoids     Migraine      Past Surgical History:   Procedure Laterality Date    APPENDECTOMY  2008    DILATION AND CURETTAGE, DIAGNOSTIC / THERAPEUTIC      UPPER GASTROINTESTINAL ENDOSCOPY      VARICOSE VEIN SURGERY Left 2012     Social History   Social History     Substance and Sexual Activity   Alcohol Use Yes    Comment: occasionally     Social History     Substance and Sexual Activity   Drug Use No     Social History     Tobacco Use   Smoking Status Never Smoker   Smokeless Tobacco Never Used   Tobacco Comment    no passive smoke exposure     Family History   Problem Relation Age of Onset    No Known Problems Mother     No Known Problems Father     Uterine cancer Paternal Grandmother        Meds/Allergies       Current Outpatient Medications:     ALPRAZolam (XANAX) 1 mg tablet    Ascorbic Acid (vitamin C) 1000 MG tablet    ergocalciferol (VITAMIN D2) 50,000 units    loratadine (CLARITIN) 10 mg tablet    butalbital-acetaminophen-caffeine (FIORICET,ESGIC) -40 mg per tablet    polyethylene glycol (GLYCOLAX) 17 GM/SCOOP powder    Probiotic Product (PROBIOTIC-10 PO)    No Known Allergies    Objective     BP 95/68   Pulse 78   Temp (!) 97 °F (36 1 °C) (Temporal)   Resp 13   Ht 5' 2" (1 575 m)   Wt 89 8 kg (198 lb)   SpO2 100%   BMI 36 21 kg/m²       PHYSICAL EXAM    Gen: NAD  Head: NCAT  CV: RRR  CHEST: Clear  ABD: soft, NT/ND  EXT: no edema      ASSESSMENT/PLAN:  This is a 55y o  year old female here for EGD and colonoscopy, and she is stable and optimized for her procedure

## 2021-09-21 NOTE — ANESTHESIA POSTPROCEDURE EVALUATION
Post-Op Assessment Note    CV Status:  Stable  Pain Score: 0    Pain management: adequate     Mental Status:  Arousable and sleepy   Hydration Status:  Euvolemic and stable   PONV Controlled:  Controlled   Airway Patency:  Patent and adequate      Post Op Vitals Reviewed: Yes      Staff: CRNA         No complications documented      BP  88/63    Temp     Pulse 71   Resp 16   SpO2 99

## 2021-09-21 NOTE — ANESTHESIA PREPROCEDURE EVALUATION
Procedure:  COLONOSCOPY  EGD    Relevant Problems   CARDIO   (+) Other hyperlipidemia      GI/HEPATIC   (+) GERD (gastroesophageal reflux disease)   (+) Other dysphagia      NEURO/PSYCH   (+) Anxiety   (+) Generalized anxiety disorder   (+) H/O seasonal allergies   (+) Major depression, recurrent (HCC)   (+) New daily persistent headache   (+) Pressure in head        Physical Exam    Airway    Mallampati score: II  TM Distance: >3 FB  Neck ROM: full     Dental       Cardiovascular      Pulmonary      Other Findings        Anesthesia Plan  ASA Score- 3     Anesthesia Type- IV sedation with anesthesia with ASA Monitors  Additional Monitors:   Airway Plan:     Comment: Face mask EtCO2  Plan Factors-Exercise tolerance (METS): >4 METS  Chart reviewed  EKG reviewed  Imaging results reviewed  Existing labs reviewed  Patient summary reviewed  Induction- intravenous  Postoperative Plan-     Informed Consent- Anesthetic plan and risks discussed with patient  I personally reviewed this patient with the CRNA  Discussed and agreed on the Anesthesia Plan with the [employer] Mccullough Anesthesia plan and consent discussed with [employer] who expressed understanding and agreement  Risks/benefits and alternatives discussed with patient including possible PONV, sore throat, damage to teeth/lips/gums and possibility of rare anesthetic and surgical emergencies

## 2021-10-28 ENCOUNTER — HOSPITAL ENCOUNTER (OUTPATIENT)
Dept: RADIOLOGY | Facility: IMAGING CENTER | Age: 46
Discharge: HOME | End: 2021-10-28
Payer: COMMERCIAL

## 2021-10-28 VITALS — WEIGHT: 200 LBS | BODY MASS INDEX: 35.44 KG/M2 | HEIGHT: 63 IN

## 2021-10-28 DIAGNOSIS — Z12.31 BREAST CANCER SCREENING BY MAMMOGRAM: ICD-10-CM

## 2021-10-28 PROCEDURE — 77067 SCR MAMMO BI INCL CAD: CPT

## 2021-10-28 PROCEDURE — 77063 BREAST TOMOSYNTHESIS BI: CPT

## 2021-11-12 ENCOUNTER — HOSPITAL ENCOUNTER (OUTPATIENT)
Dept: MAMMOGRAPHY | Facility: CLINIC | Age: 46
Discharge: HOME | End: 2021-11-12
Payer: COMMERCIAL

## 2021-11-12 ENCOUNTER — HOSPITAL ENCOUNTER (OUTPATIENT)
Dept: ULTRASOUND IMAGING | Facility: CLINIC | Age: 46
Discharge: HOME | End: 2021-11-12
Payer: COMMERCIAL

## 2021-11-12 VITALS — HEIGHT: 63 IN | WEIGHT: 200 LBS | BODY MASS INDEX: 35.44 KG/M2

## 2021-11-12 DIAGNOSIS — R92.8 ABNORMAL SCREENING MAMMOGRAM: ICD-10-CM

## 2021-11-12 PROCEDURE — 77065 DX MAMMO INCL CAD UNI: CPT

## 2021-11-12 PROCEDURE — 76642 ULTRASOUND BREAST LIMITED: CPT

## 2021-11-12 PROCEDURE — G0279 TOMOSYNTHESIS, MAMMO: HCPCS

## 2021-11-22 ENCOUNTER — HOSPITAL ENCOUNTER (OUTPATIENT)
Dept: MAMMOGRAPHY | Facility: CLINIC | Age: 46
Discharge: HOME | End: 2021-11-22

## 2021-11-22 ENCOUNTER — HOSPITAL ENCOUNTER (OUTPATIENT)
Dept: ULTRASOUND IMAGING | Facility: CLINIC | Age: 46
Discharge: HOME | End: 2021-11-22
Payer: COMMERCIAL

## 2021-11-22 VITALS — DIASTOLIC BLOOD PRESSURE: 76 MMHG | HEART RATE: 69 BPM | SYSTOLIC BLOOD PRESSURE: 111 MMHG

## 2021-11-22 DIAGNOSIS — R92.8 ABNORMAL MAMMOGRAM: ICD-10-CM

## 2021-11-22 PROCEDURE — 88305 TISSUE EXAM BY PATHOLOGIST: CPT | Performed by: SPECIALIST

## 2021-11-22 PROCEDURE — 19083 BX BREAST 1ST LESION US IMAG: CPT

## 2021-11-22 PROCEDURE — A4648 IMPLANTABLE TISSUE MARKER: HCPCS

## 2021-11-22 RX ORDER — LIDOCAINE HYDROCHLORIDE 10 MG/ML
5 INJECTION, SOLUTION EPIDURAL; INFILTRATION; INTRACAUDAL; PERINEURAL ONCE
Status: COMPLETED | OUTPATIENT
Start: 2021-11-22 | End: 2021-11-22

## 2021-11-22 RX ADMIN — LIDOCAINE HYDROCHLORIDE 5 ML: 10 INJECTION, SOLUTION EPIDURAL; INFILTRATION; INTRACAUDAL; PERINEURAL at 09:21

## 2021-11-26 ENCOUNTER — TELEPHONE (OUTPATIENT)
Dept: MAMMOGRAPHY | Facility: CLINIC | Age: 46
End: 2021-11-26

## 2021-12-22 ENCOUNTER — OFFICE VISIT (OUTPATIENT)
Dept: FAMILY MEDICINE CLINIC | Facility: CLINIC | Age: 46
End: 2021-12-22
Payer: COMMERCIAL

## 2021-12-22 VITALS
DIASTOLIC BLOOD PRESSURE: 80 MMHG | HEIGHT: 63 IN | TEMPERATURE: 97.1 F | WEIGHT: 205.13 LBS | OXYGEN SATURATION: 97 % | BODY MASS INDEX: 36.35 KG/M2 | SYSTOLIC BLOOD PRESSURE: 124 MMHG | RESPIRATION RATE: 16 BRPM | HEART RATE: 78 BPM

## 2021-12-22 DIAGNOSIS — E55.9 VITAMIN D INSUFFICIENCY: ICD-10-CM

## 2021-12-22 DIAGNOSIS — Z00.00 HEALTHCARE MAINTENANCE: Primary | ICD-10-CM

## 2021-12-22 DIAGNOSIS — E53.8 VITAMIN B12 DEFICIENCY: ICD-10-CM

## 2021-12-22 DIAGNOSIS — G47.39 OTHER SLEEP APNEA: ICD-10-CM

## 2021-12-22 DIAGNOSIS — E78.49 OTHER HYPERLIPIDEMIA: ICD-10-CM

## 2021-12-22 DIAGNOSIS — F41.9 ANXIETY: ICD-10-CM

## 2021-12-22 PROCEDURE — 1036F TOBACCO NON-USER: CPT | Performed by: FAMILY MEDICINE

## 2021-12-22 PROCEDURE — 3008F BODY MASS INDEX DOCD: CPT | Performed by: FAMILY MEDICINE

## 2021-12-22 PROCEDURE — 99396 PREV VISIT EST AGE 40-64: CPT | Performed by: FAMILY MEDICINE

## 2021-12-22 RX ORDER — ALPRAZOLAM 1 MG/1
1 TABLET ORAL DAILY PRN
Qty: 30 TABLET | Refills: 0 | Status: SHIPPED | OUTPATIENT
Start: 2021-12-22 | End: 2022-02-28 | Stop reason: SDUPTHER

## 2022-01-13 ENCOUNTER — ANNUAL EXAM (OUTPATIENT)
Dept: OBGYN CLINIC | Facility: CLINIC | Age: 47
End: 2022-01-13
Payer: MEDICARE

## 2022-01-13 VITALS
SYSTOLIC BLOOD PRESSURE: 140 MMHG | HEIGHT: 62 IN | WEIGHT: 203.6 LBS | BODY MASS INDEX: 37.47 KG/M2 | DIASTOLIC BLOOD PRESSURE: 80 MMHG

## 2022-01-13 DIAGNOSIS — Z12.31 ENCOUNTER FOR SCREENING MAMMOGRAM FOR BREAST CANCER: Primary | ICD-10-CM

## 2022-01-13 DIAGNOSIS — R53.82 CHRONIC FATIGUE: ICD-10-CM

## 2022-01-13 DIAGNOSIS — N81.11 MIDLINE CYSTOCELE: ICD-10-CM

## 2022-01-13 DIAGNOSIS — Z01.419 ENCOUNTER FOR ANNUAL ROUTINE GYNECOLOGICAL EXAMINATION: ICD-10-CM

## 2022-01-13 DIAGNOSIS — N81.6 RECTOCELE: ICD-10-CM

## 2022-01-13 PROCEDURE — 99396 PREV VISIT EST AGE 40-64: CPT | Performed by: OBSTETRICS & GYNECOLOGY

## 2022-01-13 PROCEDURE — 3008F BODY MASS INDEX DOCD: CPT | Performed by: FAMILY MEDICINE

## 2022-01-13 NOTE — PROGRESS NOTES
The patient is here for a yearly  Pap normal HPV neg 4/24/19  The patient is not due for a pap smear

## 2022-01-13 NOTE — PROGRESS NOTES
Assessment/Plan:    Normal breast and gyn exam except for rectocele  Fatigue and weight gain  Elevated TSH 2020 no follow-up for treatment  Benign breast biopsy 2021  Colonoscopy without polyps 2021  COVID and flu vaccine    Plan:  Rx mammogram   Patient going for blood work including TSH as ordered by her PMD   Continue healthy diet exercise  Continue Kegel exercises  Continue vitamin-C vitamin-D and zinc     Subjective:      Patient ID: Richard Sutton is a 55 y o  female presents for yearly exam complaining of severe fatigue and weight gain  Reviewing her chart it was noted that she had an abnormal TSH in 2020  This was never followed up according to patient  She recently saw her family physician and a CMP and TSH and vitamin-D levels were ordered  She is going to get her blood work drawn tomorrow  She understands at this fasting test     Patient was scheduled for bilateral tubal ligation and a rectocele repair in   Patient's father passed away and she had to go back to [place] to help her mother  She states that she can live with rectocele and is not interested in a tubal at this time  Her menstrual cycles are regular  She denies any pelvic pain breast bowel or bladder issues  No change in family history  Medications reviewed        Review of Systems   Constitutional: Positive for fatigue and unexpected weight change  Negative for fever  HENT: Negative  Eyes: Negative  Respiratory: Negative  Negative for chest tightness, shortness of breath, wheezing and stridor  Cardiovascular: Negative  Negative for chest pain, palpitations and leg swelling  Gastrointestinal: Negative  Negative for abdominal pain, blood in stool, diarrhea, nausea, rectal pain and vomiting  Endocrine: Negative  Genitourinary: Negative for dysuria, frequency, vaginal bleeding, vaginal discharge and vaginal pain  Musculoskeletal: Negative  Skin: Negative  Allergic/Immunologic: Negative  Neurological: Negative  Hematological: Negative  Psychiatric/Behavioral: Negative  All other systems reviewed and are negative  Objective: There were no vitals taken for this visit  Physical Exam  Constitutional:       Appearance: She is well-developed  HENT:      Head: Normocephalic and atraumatic  Neck:      Thyroid: No thyromegaly  Trachea: No tracheal deviation  Cardiovascular:      Rate and Rhythm: Normal rate and regular rhythm  Heart sounds: Normal heart sounds  Pulmonary:      Effort: Pulmonary effort is normal  No respiratory distress  Breath sounds: Normal breath sounds  No stridor  No wheezing or rales  Chest:      Chest wall: No tenderness  Breasts: Breasts are symmetrical       Right: No inverted nipple, mass, nipple discharge, skin change or tenderness  Left: No inverted nipple, mass, nipple discharge, skin change or tenderness  Abdominal:      General: Bowel sounds are normal  There is no distension  Palpations: Abdomen is soft  There is no mass  Tenderness: There is no abdominal tenderness  There is no guarding or rebound  Hernia: No hernia is present  There is no hernia in the left inguinal area  Genitourinary:     Labia:         Right: No rash, tenderness, lesion or injury  Left: No rash, tenderness, lesion or injury  Vagina: Normal  No signs of injury and foreign body  No vaginal discharge, erythema, tenderness or bleeding  Cervix: No cervical motion tenderness, discharge or friability  Uterus: Not deviated, not enlarged, not fixed and not tender  Adnexa:         Right: No mass, tenderness or fullness  Left: No mass, tenderness or fullness  Rectum: No mass, anal fissure, external hemorrhoid or internal hemorrhoid  Comments: Normal urethra and [place]'s glands  No uterine prolapse or cystocele  Moderate rectocele noted  Excellent muscle tone with Kegel exercises  Musculoskeletal:      Cervical back: Normal range of motion and neck supple  Lymphadenopathy:      Lower Body: No right inguinal adenopathy  No left inguinal adenopathy  Skin:     General: Skin is warm and dry  Neurological:      Mental Status: She is alert and oriented to person, place, and time  Psychiatric:         Behavior: Behavior normal          Thought Content:  Thought content normal          Judgment: Judgment normal

## 2022-01-14 ENCOUNTER — APPOINTMENT (OUTPATIENT)
Dept: LAB | Facility: IMAGING CENTER | Age: 47
End: 2022-01-14
Payer: MEDICARE

## 2022-01-14 DIAGNOSIS — E53.8 VITAMIN B12 DEFICIENCY: ICD-10-CM

## 2022-01-14 DIAGNOSIS — Z00.00 HEALTHCARE MAINTENANCE: ICD-10-CM

## 2022-01-14 DIAGNOSIS — E55.9 VITAMIN D INSUFFICIENCY: ICD-10-CM

## 2022-01-14 DIAGNOSIS — E78.49 OTHER HYPERLIPIDEMIA: ICD-10-CM

## 2022-01-14 LAB
25(OH)D3 SERPL-MCNC: 31.4 NG/ML (ref 30–100)
ALBUMIN SERPL BCP-MCNC: 3.7 G/DL (ref 3.5–5)
ALP SERPL-CCNC: 57 U/L (ref 46–116)
ALT SERPL W P-5'-P-CCNC: 19 U/L (ref 12–78)
ANION GAP SERPL CALCULATED.3IONS-SCNC: 3 MMOL/L (ref 4–13)
AST SERPL W P-5'-P-CCNC: 11 U/L (ref 5–45)
BASOPHILS # BLD AUTO: 0.03 THOUSANDS/ΜL (ref 0–0.1)
BASOPHILS NFR BLD AUTO: 1 % (ref 0–1)
BILIRUB SERPL-MCNC: 0.47 MG/DL (ref 0.2–1)
BUN SERPL-MCNC: 16 MG/DL (ref 5–25)
CALCIUM SERPL-MCNC: 8.6 MG/DL (ref 8.3–10.1)
CHLORIDE SERPL-SCNC: 104 MMOL/L (ref 100–108)
CHOLEST SERPL-MCNC: 201 MG/DL
CO2 SERPL-SCNC: 29 MMOL/L (ref 21–32)
CREAT SERPL-MCNC: 0.62 MG/DL (ref 0.6–1.3)
EOSINOPHIL # BLD AUTO: 0.12 THOUSAND/ΜL (ref 0–0.61)
EOSINOPHIL NFR BLD AUTO: 2 % (ref 0–6)
ERYTHROCYTE [DISTWIDTH] IN BLOOD BY AUTOMATED COUNT: 14.7 % (ref 11.6–15.1)
GFR SERPL CREATININE-BSD FRML MDRD: 108 ML/MIN/1.73SQ M
GLUCOSE P FAST SERPL-MCNC: 75 MG/DL (ref 65–99)
HCT VFR BLD AUTO: 38.5 % (ref 34.8–46.1)
HDLC SERPL-MCNC: 60 MG/DL
HGB BLD-MCNC: 11.9 G/DL (ref 11.5–15.4)
IMM GRANULOCYTES # BLD AUTO: 0.02 THOUSAND/UL (ref 0–0.2)
IMM GRANULOCYTES NFR BLD AUTO: 0 % (ref 0–2)
LDLC SERPL CALC-MCNC: 128 MG/DL (ref 0–100)
LYMPHOCYTES # BLD AUTO: 1.66 THOUSANDS/ΜL (ref 0.6–4.47)
LYMPHOCYTES NFR BLD AUTO: 27 % (ref 14–44)
MCH RBC QN AUTO: 24.1 PG (ref 26.8–34.3)
MCHC RBC AUTO-ENTMCNC: 30.9 G/DL (ref 31.4–37.4)
MCV RBC AUTO: 78 FL (ref 82–98)
MONOCYTES # BLD AUTO: 0.42 THOUSAND/ΜL (ref 0.17–1.22)
MONOCYTES NFR BLD AUTO: 7 % (ref 4–12)
NEUTROPHILS # BLD AUTO: 3.85 THOUSANDS/ΜL (ref 1.85–7.62)
NEUTS SEG NFR BLD AUTO: 63 % (ref 43–75)
NRBC BLD AUTO-RTO: 0 /100 WBCS
PLATELET # BLD AUTO: 249 THOUSANDS/UL (ref 149–390)
PMV BLD AUTO: 12.8 FL (ref 8.9–12.7)
POTASSIUM SERPL-SCNC: 4.1 MMOL/L (ref 3.5–5.3)
PROT SERPL-MCNC: 7.3 G/DL (ref 6.4–8.2)
RBC # BLD AUTO: 4.93 MILLION/UL (ref 3.81–5.12)
SODIUM SERPL-SCNC: 136 MMOL/L (ref 136–145)
T4 FREE SERPL-MCNC: 0.86 NG/DL (ref 0.76–1.46)
TRIGL SERPL-MCNC: 64 MG/DL
TSH SERPL DL<=0.05 MIU/L-ACNC: 3.93 UIU/ML (ref 0.36–3.74)
VIT B12 SERPL-MCNC: 678 PG/ML (ref 100–900)
WBC # BLD AUTO: 6.1 THOUSAND/UL (ref 4.31–10.16)

## 2022-01-14 PROCEDURE — 84439 ASSAY OF FREE THYROXINE: CPT

## 2022-01-14 PROCEDURE — 80053 COMPREHEN METABOLIC PANEL: CPT

## 2022-01-14 PROCEDURE — 80061 LIPID PANEL: CPT

## 2022-01-14 PROCEDURE — 84443 ASSAY THYROID STIM HORMONE: CPT

## 2022-01-14 PROCEDURE — 82607 VITAMIN B-12: CPT

## 2022-01-14 PROCEDURE — 36415 COLL VENOUS BLD VENIPUNCTURE: CPT

## 2022-01-14 PROCEDURE — 85025 COMPLETE CBC W/AUTO DIFF WBC: CPT

## 2022-01-14 PROCEDURE — 82306 VITAMIN D 25 HYDROXY: CPT

## 2022-01-24 ENCOUNTER — TELEPHONE (OUTPATIENT)
Dept: FAMILY MEDICINE CLINIC | Facility: CLINIC | Age: 47
End: 2022-01-24

## 2022-01-24 NOTE — TELEPHONE ENCOUNTER
Lm for pt to check her my chart for results and provider message   She is to call with any questions

## 2022-01-24 NOTE — TELEPHONE ENCOUNTER
----- Message from Maribell Cramer MD sent at 1/24/2022  6:49 AM EST -----  Blood work came back showing high cholesterol  All the rest of the blood work came back stable

## 2022-02-28 DIAGNOSIS — F41.9 ANXIETY: ICD-10-CM

## 2022-02-28 RX ORDER — ALPRAZOLAM 1 MG/1
1 TABLET ORAL DAILY PRN
Qty: 30 TABLET | Refills: 0 | Status: SHIPPED | OUTPATIENT
Start: 2022-02-28 | End: 2022-08-14

## 2022-02-28 NOTE — TELEPHONE ENCOUNTER
Patient is asking for a refill of alprazolam   Asking if it can be done today since her insurance runs out today        As of tomorrow will not have insurance

## 2022-02-28 NOTE — TELEPHONE ENCOUNTER
Pt last seen 12/22/21  Pt 's insurance expires today  Refill request sent to provider  PMED:          Patients      Select Patient Id Name D O B   R Fish [id number]   [] [address] [place] [address] 35- Aleksandra Flores [id number] Dony BOSS           Search Criteria    Name Date of Birth Date Range   Corinne Orts 38- [birth year]- To [birth year]-     Requester Name Requested Date   Werner Tamez Feb 28 2022 10:41:27 T-0500 Daiva Alok Standard Time)     Summary   Prescriptions  3  Prescribers  2  Pharmacies  1  View Map    Drug Classes   Benzodiazepines  0  Stimulants  0  Opioids  0  Muscle Relaxants  0    Opioid Dosage   Total MME for Active Prescriptions  0    Average MME  0 00  MME Graph   MME Calculator       · Prescriptions  · Notifications    · Prescribers  · Pharmacies  · MME Graph      [] PA   Drug Categories:      [] Others       Show  entries  Search:  Patient Id Prescription #  Filled Written Drug Label Qty Days Strength MME** Prescriber Pharmacy Payment REFILL #/Auth State Detail   1  [id number] 12/22/2021 12/22/2021 ALPRAZolam 30 0 30 1 MG NA DEVON) [facility]  Commercial [place] 0 / 0 [place]    1  [id number] 06/29/2021 06/29/2021 ALPRAZolam 30 0 30 1 MG NA LESVIA DORANTES  [facility]  [employer]  0 / 0 [place]    1  [id number] 03/26/2021 03/26/2021

## 2022-06-09 ENCOUNTER — TELEPHONE (OUTPATIENT)
Dept: FAMILY MEDICINE CLINIC | Facility: CLINIC | Age: 47
End: 2022-06-09

## 2022-06-09 DIAGNOSIS — E04.1 THYROID NODULE: Primary | ICD-10-CM

## 2022-06-09 NOTE — TELEPHONE ENCOUNTER
3/26/21 US thyroid recommended repeat in 1 year due to thyroid nodule   Order placed per Dr Murillo Courts request    Pt aware order placed and will call central sched to arrange

## 2023-06-13 ENCOUNTER — OFFICE VISIT (OUTPATIENT)
Dept: DERMATOLOGY | Facility: CLINIC | Age: 48
End: 2023-06-13
Payer: COMMERCIAL

## 2023-06-13 ENCOUNTER — TELEPHONE (OUTPATIENT)
Dept: DERMATOLOGY | Facility: CLINIC | Age: 48
End: 2023-06-13

## 2023-06-13 VITALS — WEIGHT: 200.1 LBS | HEIGHT: 62 IN | TEMPERATURE: 97.4 F | BODY MASS INDEX: 36.82 KG/M2

## 2023-06-13 DIAGNOSIS — L71.0 PERIORIFICIAL DERMATITIS: Primary | ICD-10-CM

## 2023-06-13 PROCEDURE — 99204 OFFICE O/P NEW MOD 45 MIN: CPT | Performed by: DERMATOLOGY

## 2023-06-13 PROCEDURE — 17000 DESTRUCT PREMALG LESION: CPT | Performed by: DERMATOLOGY

## 2023-06-13 RX ORDER — AZELAIC ACID 0.15 G/G
1 GEL TOPICAL 2 TIMES DAILY
Qty: 30 G | Refills: 1 | Status: SHIPPED | OUTPATIENT
Start: 2023-06-13

## 2023-06-13 NOTE — TELEPHONE ENCOUNTER
voice message received from patient  asking if the prescription was sent to her pharmacy   I left a voice message to her advising that she should wait for Dr Hillary Peñaloza to sign it

## 2023-06-13 NOTE — PATIENT INSTRUCTIONS
1  PERIORIFICIAL DERMATITIS    Assessment and Plan:  Based on a thorough discussion of this condition and the management approach to it (including a comprehensive discussion of the known risks, side effects and potential benefits of treatment), the patient (family) agrees to implement the following specific plan:  Reassured benign  Prescribed Finacea 15% cream use as directed  Send picture in 1 month    What is periorificial dermatitis? Periorificial dermatitis is a common facial skin problem characterized by groups of itchy or tender small red bumps  It is given this name because the bumps occur around the eyes, the nostrils, the mouth and occasionally, the genitals  The more restrictive term, perioral dermatitis, is often used when the eruption is confined to the skin in the lower half of the face, particularly around the mouth  Periocular dermatitis may be used to describe the rash affecting the eyelids  Periorificial dermatitis mainly affects adult women aged 13 to 39 years  It is less common in men  It can also affect children of any age  What is the cause of periorificial or periorificial dermatitis? The exact cause of periorificial is not understood  Periorificial dermatitis may be related to:  Skin barrier dysfunction  Activation of the body's immune system  Altered bacterial levels on the skin  Bacteria from hair follicles    Unlike seborrheic dermatitis which can affect similar areas of the face, malassezia yeasts are not involved in periorificial dermatitis  Periorificial dermatitis may be directly caused by: Topical steroids, whether applied deliberately to facial skin or inadvertently  Nasal steroids, steroid inhalers, and oral steroids  Cosmetic creams, make-ups and sunscreens  Fluorinated toothpaste  Neglecting to wash the face  Hormonal changes and/or oral contraceptives    What are the symptoms of periorificial dermatitis?   The characteristics of facial periorificial dermatitis are:  Eruption on the chin, upper lip and eyelids   Sparing of the skin bordering the lips (which then appears pale), eyelids, nostrils  Clusters of 1-2 mm red bumps, potentially with pus  Dry and flaky skin surface  Burning irritation    In contrast to steroid-induced rosacea, periorificial dermatitis spares the cheeks and forehead  Genital periorificial dermatitis has a similar clinical appearance  It involves the skin on and around labia majora (in females), scrotum (in males) and anus  Granulomatous periorificial dermatitis is a variant of periorificial dermatitis that presents with persistent yellowish bumps  It occurs mainly in young children and nearly always follows the use of a corticosteroid  Rebound flare of severe periorificial dermatitis may occur after abrupt cessation of application of potent topical steroid to facial skin  The presentation of periorificial dermatitis is usually typical, so diagnosis can be made by history and skin exam  There are no specific tests  Skin biopsy may occasionally be taken, and show similar findings to rosacea  Periorificial dermatitis responds well to treatment, although it may take several weeks before there is a noticeable improvement  General measures  Discontinue applying all face creams including topical steroids, cosmetics and sunscreens (zero therapy)  Consider a slower withdrawal from topical steroid/face creams if there is a severe flare after steroid cessation  Temporarily, replace it by a less potent or less occlusive cream or apply it less and less frequently until it is no longer required  Wash the face with warm water alone while the rash is present  When it has cleared up, use a non-soap bar or liquid cleanser if you wish  Choose a liquid or gel sunscreen  Topical therapy: used to treat mild periorificial dermatitis   Choices include:  Erythromycin  Clindamycin  Metronidazole  Pimecrolimus  Azelaic acid    Oral therapy: in more severe "cases, a course of oral antibiotics may be prescribed for 6-12 weeks  Most often, a tetracycline such as doxycycline is recommended  A sub-antimicrobial dose may be sufficient  Oral erythromycin is used during pregnancy and in pre-pubertal children  Oral low-dose isotretinoin may be used if antibiotics are ineffective or contraindicated  Periorificial dermatitis can generally be prevented by the avoidance of topical steroids and occlusive face creams  When topical steroids are necessary to treat an inflammatory facial rash, they should be applied accurately to the affected area, no more than once daily in the lowest effective potency, and discontinued as soon as the rash responds  Periorificial dermatitis sometimes recurs when the antibiotics are discontinued, or at a later date  The same treatment can be used again  2  VERRUCA VULGARIS (\"Common Warts\")    Assessment and Plan:  Based on a thorough discussion of this condition and the management approach to it (including a comprehensive discussion of the known risks, side effects and potential benefits of treatment), the patient (family) agrees to implement the following specific plan:  Reassured benign  Cryotherapy treatment today, consider removal if no improvement within 2 weeks appointment scheduled 09/12/23    Verruca Vulgaris  A verruca is a common growth of the skin caused by infection by human papilloma virus (HPV)  There are many strains of the virus that cause different types of warts on the body  The virus infects the most superficial layers of the skin, causing increased production of skin cells and thickening  Warts can be spread through direct contact with infected skin and may spread to other parts of the body if scratched or picked  A verruca is more commonly called a \"wart  \" Warts are particularly common in school-aged children but can arise at any age   Patients who have a history of eczema are especially prone due to impaired skin " barrier  Those taking immunosuppressive drugs or with HIV infections may experience prolonged symptoms despite treatment  Warts generally have a rough surface with a tiny black dot sometimes observed in the middle of each scaly spot  They can range in size from a small bump to large scaly growths  Common warts are often found on the backs of fingers or toes, around the nails, and on the knees  Plantar warts can grow inwardly on the soles of the feet causing pain  There are many possible ways to treat warts and sometimes several different treatments are needed to get the warts to go away completely  There is no single perfect treatment for warts, and successful treatment can take many months  In-office treatments usually require multiple visits, and include:  Cryotherapy  a cold spray with liquid nitrogen will destroy the infected cells but may lead to discomfort and blistering  It may also leave a permanent white andrew or scar  Electrosurgery (curettage and cautery) can be used for large resistant warts which involves shaving the growth down and burning the base  It is performed under local anesthesia and may leave a permanent scar    Candida (“yeast”) antigen injections  These are extracts of the common yeast (Candida) that cannot cause an infection  The medication is injected into/under the wart  It is thought to stimulate the immune system to recognize the wart virus and attack it  Multiple injections are often needed about one month apart  There are also several at-home wart treatments:    Soak the warts in warm water for 5 minutes every night followed by gentle filing with a nail file or pumice stone      Topical salicylic acid or similar compounds work by removing the dead surface skin cells  Apply the medicine directly to the wart, wait for it to dry completely, then cover with duct tape overnight   Repeat until the wart is gone, which can take 2-4 months  Do not use on the face or groin area   If the wart paint makes the skin sore, stop treatment until the discomfort has settled, then recommence as above  Take care to keep the chemical off normal skin  Podophyllin is a cytotoxic agent used in some products and must not be used in pregnancy or women considering pregnancy  Some prescription medications include   Topical retinoids (adapalene, tretinoin, tazarotene), 5-fluorouracil (Efudex) or imiquimod (Aldara) creams are sometimes used to treat flat warts or warts on the face and other sensitive anatomical areas  They are usually applied directly to the warts once a day for 2-4 months and can be irritating  These treatments should only be used as directed by your health care provider  Systemic treatment with oral cimetidine (Tagamet) may help boost the immune system against the wart virus in patients, some of the time  Initiation of cimetidine therapy should ONLY be done under the supervision of your health care provider, who can discuss possible side effects and drug-to-drug interactions of this specific treatment

## 2023-06-14 NOTE — TELEPHONE ENCOUNTER
Spoke with the patient and she is aware that her prescription was sent into [facility] and is ready for   Patient had no further questions or concerns at this time

## 2023-07-18 ENCOUNTER — TELEPHONE (OUTPATIENT)
Dept: UROLOGY | Facility: AMBULATORY SURGERY CENTER | Age: 48
End: 2023-07-18

## 2023-07-18 NOTE — TELEPHONE ENCOUNTER
Returned call to patient . Appt given in our [address] [place] [address] location for 7/20/23 . Will bring a copy of imaging.

## 2023-07-18 NOTE — TELEPHONE ENCOUNTER
New Patient for Triage     New Patient    What is the reason for the patient’s appointment? NP- ER f/u for kidney stones. Patient went to urgent care at [facility] while on vacation, will be back home tomorrow. She has microscopic hematuria, bladder pressure, pain was in kidney are and abdomen. Patient can be reached at [phone]    What office location does the patient prefer? [place]     Does patient have Imaging/Lab Results? In Epic    Have patient records been requested? If No, are the records showing in Epic:   N/A    INSURANCE:   Do we accept the patient's insurance or is the patient Self-Pay? [employer]     HISTORY:   Has the patient had any previous Urologist(s)? No    Was the patient seen in the ED? Yes    Has the patient had any outside testing done? No    Does the patient have a personal history of cancer?  No

## 2023-07-21 ENCOUNTER — OFFICE VISIT (OUTPATIENT)
Dept: UROLOGY | Facility: CLINIC | Age: 48
End: 2023-07-21
Payer: COMMERCIAL

## 2023-07-21 VITALS
BODY MASS INDEX: 36.44 KG/M2 | WEIGHT: 198 LBS | SYSTOLIC BLOOD PRESSURE: 114 MMHG | HEART RATE: 69 BPM | HEIGHT: 62 IN | DIASTOLIC BLOOD PRESSURE: 72 MMHG | OXYGEN SATURATION: 100 %

## 2023-07-21 DIAGNOSIS — N20.1 URETERAL STONE: Primary | ICD-10-CM

## 2023-07-21 LAB
POST-VOID RESIDUAL VOLUME, ML POC: 0 ML
SL AMB  POCT GLUCOSE, UA: NORMAL
SL AMB LEUKOCYTE ESTERASE,UA: NORMAL
SL AMB POCT BILIRUBIN,UA: NORMAL
SL AMB POCT BLOOD,UA: NORMAL
SL AMB POCT CLARITY,UA: CLEAR
SL AMB POCT COLOR,UA: YELLOW
SL AMB POCT KETONES,UA: NORMAL
SL AMB POCT NITRITE,UA: NORMAL
SL AMB POCT PH,UA: 7.5
SL AMB POCT SPECIFIC GRAVITY,UA: 1.01
SL AMB POCT URINE PROTEIN: NORMAL
SL AMB POCT UROBILINOGEN: 0.2

## 2023-07-21 PROCEDURE — 99203 OFFICE O/P NEW LOW 30 MIN: CPT | Performed by: NURSE PRACTITIONER

## 2023-07-21 PROCEDURE — 51798 US URINE CAPACITY MEASURE: CPT | Performed by: NURSE PRACTITIONER

## 2023-07-21 PROCEDURE — 81002 URINALYSIS NONAUTO W/O SCOPE: CPT | Performed by: NURSE PRACTITIONER

## 2023-07-21 PROCEDURE — 87086 URINE CULTURE/COLONY COUNT: CPT | Performed by: NURSE PRACTITIONER

## 2023-07-21 RX ORDER — OXYCODONE HYDROCHLORIDE AND ACETAMINOPHEN 5; 325 MG/1; MG/1
1 TABLET ORAL EVERY 6 HOURS PRN
COMMUNITY
Start: 2023-07-18

## 2023-07-21 RX ORDER — TAMSULOSIN HYDROCHLORIDE 0.4 MG/1
CAPSULE ORAL DAILY
COMMUNITY
Start: 2023-07-18 | End: 2023-07-21 | Stop reason: SDUPTHER

## 2023-07-21 RX ORDER — TAMSULOSIN HYDROCHLORIDE 0.4 MG/1
0.4 CAPSULE ORAL
Qty: 30 CAPSULE | Refills: 0 | Status: SHIPPED | OUTPATIENT
Start: 2023-07-21

## 2023-07-21 NOTE — ASSESSMENT & PLAN NOTE
· Ct 2 left ureteral stones (5 and 7 mm); will have images uploaded  · Passed a stone in the office today  · Send stone analysis  · Us/kub 6 weeks  · Continue flomax  · Continue to strain urine  · ER precautions  · Follow up 10 weeks, will cancel if imaging is normal

## 2023-07-21 NOTE — PROGRESS NOTES
Assessment and plan:     Ureteral stone  · Ct 2 left ureteral stones (5 and 7 mm); will have images uploaded  · Passed a stone in the office today  · Send stone analysis  · Us/kub 6 weeks  · Continue flomax  · Continue to strain urine  · ER precautions  · Follow up 10 weeks, will cancel if imaging is normal      PARUL Valenzuela    History of Present Illness     Ricco Jarquin is a 50 y.o. new patient who was on vacation and developed flank pain. She presented to a hospital in [place] for the same. She had a ct scan 7/18/2023 that revealed a a 7 mm calculus at the left UPJ with mild hydronephrosis and an additional 5 mm calculus at the left UPJ    She has passed one stone, now she feels some bladder pressure and "twinging". She had possible stone in her 19's. Nothing since that time. While in the office today she passed a stone. Will send for analysis.     Laboratory     Lab Results   Component Value Date    BUN 16 01/14/2022    CREATININE 0.62 01/14/2022       No components found for: "GFR"    Lab Results   Component Value Date    CALCIUM 8.6 01/14/2022    K 4.1 01/14/2022    CO2 29 01/14/2022     01/14/2022       Lab Results   Component Value Date    WBC 6.10 01/14/2022    HGB 11.9 01/14/2022    HCT 38.5 01/14/2022    MCV 78 (L) 01/14/2022     01/14/2022       No results found for: "PSA"    Recent Results (from the past 1 hour(s))   POCT Measure PVR    Collection Time: 07/21/23 10:17 AM   Result Value Ref Range    POST-VOID RESIDUAL VOLUME, ML POC 0 mL   POCT urine dip    Collection Time: 07/21/23 10:19 AM   Result Value Ref Range    LEUKOCYTE ESTERASE,UA +     NITRITE,UA -     SL AMB POCT UROBILINOGEN 0.2     POCT URINE PROTEIN -      PH,UA 7.5     BLOOD,UA -     SPECIFIC GRAVITY,UA 1.010     KETONES,UA -     BILIRUBIN,UA -     GLUCOSE, UA -      COLOR,UA yellow     CLARITY,UA clear        @RESULT(URINEMICROSCOPIC)@    @RESULT(URINECULTURE)@    Radiology     Addendum    Addendum by Nandini Gurrola Yesica Suarez MD on 07/18/2023  5:56 EDT     Recommend comparison with prior exam/MRI for nonspecific hepatic mass. Impression      A 7 mm calculus at the left ureteropelvic junction with mild hydronephrosis. A 5 mm calculus at the left ureterovesical junction with mild hydroureter. Nonobstructing left renal calculi. Report Electronically Signed By: Liudmila Man at 7/18/2023 5:21:43 AM (All report dictations are in [address])  Narrative       Technologist:   D.G. MLaunie Halsted       CT scan of the abdomen and pelvis July 18, 2023 at 0408 hours     Clinical History: Left flank pain; rule out kidney stone. Technique: Helical axial sections with sagittal and coronal reformats of the abdomen and pelvis were obtained without intravenous contrast. Iterative reconstruction technique was employed to reduce patient radiation exposure. Radiation Dose: Total exam .34 mGy/cm. Findings:   Clear lung bases. Ill-defined 3.9 x 2.7 x 3.0 cm hypodense lesion in the left hepatic lobe. Spleen, adrenal glands, pancreas and gallbladder are unremarkable. Normal right kidney. Nonobstructing left renal calculi. A 7 mm calculus at the left ureteropelvic junction with mild hydronephrosis. A 5 mm calculus at the left ureterovesical junction, image 88 with mild hydroureter. Normal right ureter. No free intraperitoneal air or fluid. No evidence of appendicitis. Bowel caliber is normal. The abdominal wall is unremarkable. No acute osseous process.    Exam End: 07/18/23  4:13 AM    Specimen Collected: 07/18/23  5:22 AM Last Resulted: 07/18/23  5:21 AM   Received From: [address] [place] [address]  Result Received: 07/18/23  3:59 PM     View Encounter      Received Information    Review of Systems     Review of Systems   Constitutional: Negative for activity change, appetite change, chills, fatigue, fever and unexpected weight change. HENT: Negative for facial swelling. Eyes: Negative for discharge. Respiratory: Negative. Negative for cough and shortness of breath. Cardiovascular: Negative for chest pain and leg swelling. Gastrointestinal: Negative. Negative for abdominal distention, abdominal pain, constipation, diarrhea, nausea and vomiting. Lower abdominal pressure, resolving   Endocrine: Negative. Genitourinary: Negative. Negative for decreased urine volume, difficulty urinating, dysuria, enuresis, flank pain, frequency, genital sores, hematuria and urgency. Musculoskeletal: Negative for back pain and myalgias. Skin: Negative for pallor and rash. Allergic/Immunologic: Negative. Negative for immunocompromised state. Neurological: Negative for facial asymmetry and speech difficulty. Psychiatric/Behavioral: Negative for agitation and confusion. Allergies     No Known Allergies    Physical Exam     Physical Exam  Vitals reviewed. Constitutional:       General: She is not in acute distress. Appearance: Normal appearance. She is not ill-appearing, toxic-appearing or diaphoretic. HENT:      Head: Normocephalic and atraumatic. Eyes:      General: No scleral icterus. Cardiovascular:      Rate and Rhythm: Normal rate. Pulmonary:      Effort: Pulmonary effort is normal. No respiratory distress. Abdominal:      General: Abdomen is flat. There is no distension. Palpations: Abdomen is soft. Tenderness: There is no abdominal tenderness. There is no right CVA tenderness, left CVA tenderness, guarding or rebound. Musculoskeletal:         General: No swelling. Cervical back: Normal range of motion. Skin:     General: Skin is warm and dry. Coloration: Skin is not jaundiced or pale. Findings: No rash. Neurological:      General: No focal deficit present. Mental Status: She is alert and oriented to person, place, and time.       Gait: Gait normal.   Psychiatric:         Mood and Affect: Mood normal.         Behavior: Behavior normal. Thought Content:  Thought content normal.         Judgment: Judgment normal.         Vital Signs     Vitals:    07/21/23 1006   BP: 114/72   Pulse: 69   SpO2: 100%   Weight: 89.8 kg (198 lb)   Height: 5' 2" (1.575 m)       Current Medications       Current Outpatient Medications:   •  Azelaic Acid (Finacea) 15 % cream, Apply 1 Application topically 2 (two) times a day, Disp: 30 g, Rfl: 1  •  oxyCODONE-acetaminophen (PERCOCET) 5-325 mg per tablet, Take 1 tablet by mouth every 6 (six) hours as needed, Disp: , Rfl:   •  Probiotic Product (PROBIOTIC-10 PO), Take by mouth, Disp: , Rfl:   •  tamsulosin (FLOMAX) 0.4 mg, Take 1 capsule (0.4 mg total) by mouth daily with dinner, Disp: 30 capsule, Rfl: 0  •  ALPRAZolam (XANAX) 1 mg tablet, Take 1 tablet (1 mg total) by mouth daily as needed for anxiety (Patient not taking: Reported on 6/13/2023), Disp: 30 tablet, Rfl: 0  •  butalbital-acetaminophen-caffeine (FIORICET,ESGIC) -40 mg per tablet, TAKE 1 TABLET BY MOUTH 3 TIMES A DAY AS NEEDED FOR MIGRAINE (Patient not taking: Reported on 7/21/2023), Disp: 60 tablet, Rfl: 1  •  ergocalciferol (VITAMIN D2) 50,000 units, Take 1 capsule (50,000 Units total) by mouth once a week (Patient not taking: Reported on 7/21/2023), Disp: 4 capsule, Rfl: 2  •  loratadine (CLARITIN) 10 mg tablet, Take 1 tablet (10 mg total) by mouth daily (Patient not taking: Reported on 7/21/2023), Disp: 30 tablet, Rfl: 3    Active Problems     Patient Active Problem List   Diagnosis   • Chronic fatigue   • Generalized anxiety disorder   • GERD (gastroesophageal reflux disease)   • Major depression, recurrent (HCC)   • Migraine with aura and without status migrainosus, not intractable   • Varicose veins of leg with edema, unspecified laterality   • Vitamin D deficiency   • Anxiety   • H/O seasonal allergies   • Urinary tract infection without hematuria   • Dysuria   • Pressure in head   • New daily persistent headache   • Restless leg syndrome   • Insomnia • Immunization due   • Healthcare maintenance   • Encounter for sterilization   • Chronic idiopathic constipation   • BMI 35.0-35.9,adult   • Dermatitis   • Goiter   • Breast cancer screening by mammogram   • Other dysphagia   • Other hyperlipidemia   • Class 2 obesity due to excess calories without serious comorbidity with body mass index (BMI) of 36.0 to 36.9 in adult   • Other sleep apnea   • Ureteral stone       Past Medical History     Past Medical History:   Diagnosis Date   • Anxiety    • GERD (gastroesophageal reflux disease)     occ   • Hemorrhoids    • Kidney stone 7/18/23   • Migraine        Surgical History     Past Surgical History:   Procedure Laterality Date   • APPENDECTOMY  2008   • DILATION AND CURETTAGE, DIAGNOSTIC / THERAPEUTIC     • UPPER GASTROINTESTINAL ENDOSCOPY     • US GUIDED BREAST BIOPSY LEFT COMPLETE Left 11/22/2021   • VARICOSE VEIN SURGERY Left 2012       Family History     Family History   Problem Relation Age of Onset   • No Known Problems Mother    • No Known Problems Father    • Uterine cancer Paternal Grandmother    • No Known Problems Sister    • No Known Problems Maternal Grandmother    • No Known Problems Maternal Grandfather    • No Known Problems Paternal Grandfather    • No Known Problems Son    • No Known Problems Son    • Lymphoma Maternal Aunt 46   • Breast cancer Neg Hx        Social History     Social History     Social History     Tobacco Use   Smoking Status Never   • Passive exposure: Past   Smokeless Tobacco Never   Tobacco Comments    no passive smoke exposure       Past Surgical History:   Procedure Laterality Date   • APPENDECTOMY  2008   • DILATION AND CURETTAGE, DIAGNOSTIC / THERAPEUTIC     • UPPER GASTROINTESTINAL ENDOSCOPY     • US GUIDED BREAST BIOPSY LEFT COMPLETE Left 11/22/2021   • VARICOSE VEIN SURGERY Left 2012         The following portions of the patient's history were reviewed and updated as appropriate: allergies, current medications, past family history, past medical history, past social history, past surgical history and problem list    Please note :  Voice dictation software has been used to create this document. There may be inadvertent transcription errors.     [id number] Phillip Mathesw

## 2023-07-21 NOTE — PATIENT INSTRUCTIONS
Dietary Management of Kidney Stone Disease    The dietary recommendations for most people who make kidney stones (especially the most common calcium oxalate stones) are uncomplicated and are not too tedious or bland. Most importantly, the following recommendations also promote better health for a variety of reasons. FLUIDS:  The single most important change for the majority patients is the need to greatly increase fluid intake. You should at least produce two liters (about two quarts) of urine each day. Depending on the heat outdoors and your level of physical activity, this usually means consuming ten, 10 ounce glasses (100 ounces) of fluid per day. Water is always a good choice, but other drinks including tea, coffee, soda, and juice are also allowed as long as no one beverage becomes the sole source of fluid. CALCIUM:  There is excellent evidence that calcium should not be avoided, but instead moderated. A range of 600 to 1,100 mg of calcium per day, especially consumed at meals is probably a reasonable target. (i.e. 2-3 dairy servings per day) This might include small servings of yogurt, milk or ice cream.  This amount helps avoid over-absorption of oxalate from the digestive tract and also allows for healthy bone maintenance. SODIUM (SALT): Too much salt in your diet (both from the shaker and in the prepared foods that we buy) is bad for your blood pressure, bad for your heart, and also increases the amount of calcium in your urine. A reasonable sodium restriction to 2,000-2,500mg/day (about the amount in one teaspoon) is an excellent target. You should get into the habit of reading the “Nutrients” labels on all the foods that you eat and watch out for the foods that have a high sodium content (snack foods, smoked or processed foods, caned foods). Fresh and frozen foods usually have the least amount of sodium.     PROTEIN:  High protein diets from animal meat (beef, chicken, pork, fish) also increases the rate of kidney stone formation and is equally unhealthy for your heart. All patients should moderate their meat intake to 3-7 ounces per day, and particularly stay away from red meat protein. OXALATE:  Most stone-formers should avoid heavy intake of oxalate-rich foods. These include green roughage (spinach, mustard, kale), strawberries, chocolate, tea, iced tea, and nuts. In addition, heavy, excess doses of Vitamin C can also produce surges in urinary oxalate levels and should be avoided. ** THESE FOODS ARE HIGH IN OXALATE - TRY TO LIMIT HOW MUCH OF THESE YOU EAT:  Coke and Pepsi  Nuts  Dark Leafy Greens:     Spinach and Kale, Rhubarb, Chard  Asparagus  Beets  Sweet potatoes   Blueberries, Strawberries   Dark tea (Green tea is okay)  Tofu      DRINK 3 QUARTS (96 Oz.) LIQUIDS EACH DAY - ALL LIQUIDS COUNT        ADD LEMON JUICE 3 OZ. DAILY - Fresh squeezed or lemon juice concentrate - Not MinuteMaid, [place], Crystal Lite, etc.        ** Recipe for LOUIE'S OLDARMANDO TYME LEMONADE:         One ounce of lemon juice, glass of water, sweetener of your choice    Coffee is okay! Cranberry juice is good to prevent infections, but does not help for stones. BARE-BONES RECOMMENDATIONS:  Fluids, fluids, fluids. Low salt diet (your primary care doctor will love you). Moderate red meat intake. Moderate calcium (dairy products), especially with meals.

## 2023-07-22 LAB — BACTERIA UR CULT: NORMAL

## 2023-07-27 LAB
COLOR STONE: NORMAL
COM MFR STONE: 50 %
COMMENT-STONE3: NORMAL
COMPOSITION: NORMAL
HYDROXYAPATITE 24H ENGDIFF UR: 50 %
LABORATORY COMMENT REPORT: NORMAL
PHOTO: NORMAL
SIZE STONE: NORMAL MM
SPEC SOURCE SUBJ: NORMAL
STONE ANALYSIS-IMP: NORMAL
STONE ANALYSIS-IMP: NORMAL
WT STONE: 24 MG

## 2023-08-28 ENCOUNTER — HOSPITAL ENCOUNTER (EMERGENCY)
Facility: HOSPITAL | Age: 48
Discharge: HOME | End: 2023-08-28
Attending: EMERGENCY MEDICINE
Payer: COMMERCIAL

## 2023-08-28 ENCOUNTER — VBI (OUTPATIENT)
Dept: ADMINISTRATIVE | Facility: OTHER | Age: 48
End: 2023-08-28

## 2023-08-28 VITALS
DIASTOLIC BLOOD PRESSURE: 65 MMHG | SYSTOLIC BLOOD PRESSURE: 101 MMHG | OXYGEN SATURATION: 95 % | HEART RATE: 79 BPM | TEMPERATURE: 98 F | RESPIRATION RATE: 18 BRPM

## 2023-08-28 DIAGNOSIS — R10.9 FLANK PAIN: Primary | ICD-10-CM

## 2023-08-28 LAB
BACTERIA UR QL AUTO: ABNORMAL /HPF
BILIRUB UR QL STRIP: NEGATIVE
CLARITY UR: ABNORMAL
COLOR UR: YELLOW
GLUCOSE UR STRIP-MCNC: NEGATIVE MG/DL
HGB UR QL STRIP.AUTO: ABNORMAL
KETONES UR STRIP-MCNC: NEGATIVE MG/DL
LEUKOCYTE ESTERASE UR QL STRIP: ABNORMAL
MUCOUS THREADS UR QL AUTO: ABNORMAL
NITRITE UR QL STRIP: NEGATIVE
NON-SQ EPI CELLS URNS QL MICRO: ABNORMAL /HPF
PH UR STRIP.AUTO: 7 [PH]
PROT UR STRIP-MCNC: ABNORMAL MG/DL
RBC #/AREA URNS AUTO: ABNORMAL /HPF
SP GR UR STRIP.AUTO: 1.02 (ref 1–1.03)
UROBILINOGEN UR STRIP-ACNC: <2 MG/DL
WBC #/AREA URNS AUTO: ABNORMAL /HPF

## 2023-08-28 PROCEDURE — 76775 US EXAM ABDO BACK WALL LIM: CPT | Performed by: EMERGENCY MEDICINE

## 2023-08-28 PROCEDURE — 81001 URINALYSIS AUTO W/SCOPE: CPT

## 2023-08-28 PROCEDURE — 99284 EMERGENCY DEPT VISIT MOD MDM: CPT

## 2023-08-28 PROCEDURE — 99284 EMERGENCY DEPT VISIT MOD MDM: CPT | Performed by: EMERGENCY MEDICINE

## 2023-08-29 NOTE — ED PROVIDER NOTES
History  Chief Complaint   Patient presents with   • Flank Pain     Pt reports having kidney stones in the last 5 weeks, now feeling pain again in R side     80-year-old female with a history of kidney stones, GERD, and migraines who presents complaining of left flank pain that began this evening. The patient states that the pain is similar to prior kidney stones about 5 weeks ago. Patient states that at that time she had to stones in her left ureter and 1 in the left kidney. The patient states that she passed one of the stones but is unsure if she passed the second ureteral stone. The patient states that her pain began in her left flank and radiated to her left lower abdomen. The patient states that after her pain began she took a Percocet that she had leftover from her recent ED visit. The patient states that since arriving in the ED her pain has resolved. The patient denies fever, chills, chest pain, shortness of breath, nausea, vomiting, abdominal pain, diarrhea, dysuria, hematuria. Prior to Admission Medications   Prescriptions Last Dose Informant Patient Reported? Taking?    ALPRAZolam (XANAX) 1 mg tablet   No No   Sig: Take 1 tablet (1 mg total) by mouth daily as needed for anxiety   Patient not taking: Reported on 6/13/2023   Azelaic Acid (Finacea) 15 % cream  Self No No   Sig: Apply 1 Application topically 2 (two) times a day   Probiotic Product (PROBIOTIC-10 PO)  Self Yes No   Sig: Take by mouth   butalbital-acetaminophen-caffeine (FIORICET,ESGIC) -40 mg per tablet  Self No No   Sig: TAKE 1 TABLET BY MOUTH 3 TIMES A DAY AS NEEDED FOR MIGRAINE   Patient not taking: Reported on 7/21/2023   ergocalciferol (VITAMIN D2) 50,000 units  Self No No   Sig: Take 1 capsule (50,000 Units total) by mouth once a week   Patient not taking: Reported on 7/21/2023   loratadine (CLARITIN) 10 mg tablet  Self No No   Sig: Take 1 tablet (10 mg total) by mouth daily   Patient not taking: Reported on 7/21/2023 oxyCODONE-acetaminophen (PERCOCET) 5-325 mg per tablet  Self Yes No   Sig: Take 1 tablet by mouth every 6 (six) hours as needed   tamsulosin (FLOMAX) 0.4 mg   No No   Sig: Take 1 capsule (0.4 mg total) by mouth daily with dinner      Facility-Administered Medications: None       Past Medical History:   Diagnosis Date   • Anxiety    • GERD (gastroesophageal reflux disease)     occ   • Hemorrhoids    • Kidney stone 7/18/23   • Migraine        Past Surgical History:   Procedure Laterality Date   • APPENDECTOMY  2008   • DILATION AND CURETTAGE, DIAGNOSTIC / THERAPEUTIC     • UPPER GASTROINTESTINAL ENDOSCOPY     • US GUIDED BREAST BIOPSY LEFT COMPLETE Left 11/22/2021   • VARICOSE VEIN SURGERY Left 2012       Family History   Problem Relation Age of Onset   • No Known Problems Mother    • No Known Problems Father    • Uterine cancer Paternal Grandmother    • No Known Problems Sister    • No Known Problems Maternal Grandmother    • No Known Problems Maternal Grandfather    • No Known Problems Paternal Grandfather    • No Known Problems Son    • No Known Problems Son    • Lymphoma Maternal Aunt 46   • Breast cancer Neg Hx      I have reviewed and agree with the history as documented. E-Cigarette/Vaping   • E-Cigarette Use Never User      E-Cigarette/Vaping Substances   • Nicotine No    • Flavoring No      Social History     Tobacco Use   • Smoking status: Never     Passive exposure: Past   • Smokeless tobacco: Never   • Tobacco comments:     no passive smoke exposure   Vaping Use   • Vaping Use: Never used   Substance Use Topics   • Alcohol use: Not Currently     Comment: maybe once a month   • Drug use: No        Review of Systems   Constitutional: Negative for chills, diaphoresis and fever. HENT: Negative for congestion and sore throat. Eyes: Negative for pain and redness. Respiratory: Negative for cough and shortness of breath. Cardiovascular: Negative for chest pain, palpitations and leg swelling. Gastrointestinal: Negative for abdominal pain, diarrhea, nausea and vomiting. Genitourinary: Positive for flank pain. Negative for dysuria and hematuria. Musculoskeletal: Negative for arthralgias and myalgias. Skin: Negative for color change, pallor and rash. Neurological: Negative for dizziness, syncope, weakness, light-headedness, numbness and headaches. All other systems reviewed and are negative. Physical Exam  ED Triage Vitals [08/28/23 1911]   Temperature Pulse Respirations Blood Pressure SpO2   98 °F (36.7 °C) 79 18 101/65 95 %      Temp src Heart Rate Source Patient Position - Orthostatic VS BP Location FiO2 (%)   -- Monitor -- -- --      Pain Score       5             Orthostatic Vital Signs  Vitals:    08/28/23 1911   BP: 101/65   Pulse: 79       Physical Exam  Vitals and nursing note reviewed. Constitutional:       General: She is not in acute distress. Appearance: Normal appearance. She is not ill-appearing, toxic-appearing or diaphoretic. HENT:      Head: Normocephalic and atraumatic. Nose: Nose normal. No congestion or rhinorrhea. Mouth/Throat:      Mouth: Mucous membranes are moist.      Pharynx: Oropharynx is clear. Eyes:      General: No scleral icterus. Extraocular Movements: Extraocular movements intact. Conjunctiva/sclera: Conjunctivae normal.      Pupils: Pupils are equal, round, and reactive to light. Cardiovascular:      Rate and Rhythm: Normal rate and regular rhythm. Pulses: Normal pulses. Heart sounds: Normal heart sounds. No murmur heard. No friction rub. No gallop. Pulmonary:      Effort: Pulmonary effort is normal.      Breath sounds: Normal breath sounds. No wheezing, rhonchi or rales. Abdominal:      General: Abdomen is flat. Palpations: Abdomen is soft. Tenderness: There is no abdominal tenderness. There is no right CVA tenderness, left CVA tenderness, guarding or rebound.    Musculoskeletal:         General: No swelling, tenderness, deformity or signs of injury. Normal range of motion. Cervical back: Normal range of motion and neck supple. No rigidity or tenderness. Right lower leg: No edema. Left lower leg: No edema. Lymphadenopathy:      Cervical: No cervical adenopathy. Skin:     General: Skin is warm and dry. Capillary Refill: Capillary refill takes less than 2 seconds. Coloration: Skin is not jaundiced or pale. Findings: No bruising, erythema, lesion or rash. Neurological:      General: No focal deficit present. Mental Status: She is alert and oriented to person, place, and time.          ED Medications  Medications - No data to display    Diagnostic Studies  Results Reviewed     Procedure Component Value Units Date/Time    Urine Microscopic [[id number]]  (Abnormal) Collected: 08/28/23 2103    Lab Status: Final result Specimen: Urine, Clean Catch Updated: 08/28/23 2122     RBC, UA Innumerable /hpf      WBC, UA 1-2 /hpf      Epithelial Cells Occasional /hpf      Bacteria, UA None Seen /hpf      MUCUS THREADS Occasional    UA w Reflex to Microscopic w Reflex to Culture [[id number]]  (Abnormal) Collected: 08/28/23 2103    Lab Status: Final result Specimen: Urine, Clean Catch Updated: 08/28/23 2111     Color, UA Yellow     Clarity, UA Turbid     Specific Gravity, UA 1.020     pH, UA 7.0     Leukocytes, UA Trace     Nitrite, UA Negative     Protein, UA 30 (1+) mg/dl      Glucose, UA Negative mg/dl      Ketones, UA Negative mg/dl      Urobilinogen, UA <2.0 mg/dl      Bilirubin, UA Negative     Occult Blood, UA Large                 No orders to display         Procedures  POC Renal US    Date/Time: 8/28/2023 9:25 PM    Performed by: Nathalia oLuis DO  Authorized by: Nathalia Louis DO    Patient location:  ED  Performed by:  Resident  Other Assisting Provider: No    Procedure details:     Exam Type:  Diagnostic    Indications: flank/back pain      Assessment for: Suspected hydronephrosis    Views obtained: left kidney and right kidney      Image quality: diagnostic      Image availability:  Images available in PACS, still images obtained and video obtained  Findings:     LEFT kidney findings: unremarkable      LEFT hydronephrosis: none      RIGHT kidney findings: unremarkable      RIGHT hydronephrosis: none      Bladder:  Not visualized  Interpretation:     Renal ultrasound impressions: normal exam            ED Course                                       Medical Decision Making  59-year-old female with a history of kidney stones, GERD, and migraines who presents complaining of left flank pain that began this evening. The patient states that the pain is similar to prior kidney stones about 5 weeks ago. Vitals are within the normal limits. On exam the patient is well-appearing, heart is regular rate and rhythm, lungs are clear to auscultation bilaterally, abdomen is soft and nontender, no CVA tenderness, no lower extremity edema. Suspect the patient's symptoms are likely due to to ureterolithiasis. Patient has follow-up with her urologist in 1 week. The patient is currently asymptomatic. Will perform bedside renal ultrasound and order UA. Bedside ultrasound shows no hydronephrosis. Urine shows blood but no bacteriuria. The patient is instructed to follow-up with her urologist.  Return precautions are given and the patient is discharged. Disposition  Final diagnoses:   Flank pain     Time reflects when diagnosis was documented in both MDM as applicable and the Disposition within this note     Time User Action Codes Description Comment    8/28/2023  9:52 PM Kavitha LOCKWOOD Add [R10.9] Flank pain       ED Disposition     ED Disposition   Discharge    Condition   Stable    Date/Time   Mon Aug 28, 2023  9:52 PM    Comment   Eric Ganser discharge to home/self care.                Follow-up Information     Follow up With Specialties Details Why Contact Info Additional [address] [facility] Emergency Department Emergency Medicine Go to  If symptoms worsen [address] [id number]  [facility] Emergency Department, [address] [facility], [place], [place], [facility]          Discharge Medication List as of 8/28/2023  9:54 PM      CONTINUE these medications which have NOT CHANGED    Details   ALPRAZolam Zachery Thomas) 1 mg tablet Take 1 tablet (1 mg total) by mouth daily as needed for anxiety, Starting Mon 2/28/2022, Until Sun 8/14/2022 at 2359, Normal      Azelaic Acid (Finacea) 15 % cream Apply 1 Application topically 2 (two) times a day, Starting Tue 6/13/2023, Normal      butalbital-acetaminophen-caffeine (FIORICET,ESGIC) -40 mg per tablet TAKE 1 TABLET BY MOUTH 3 TIMES A DAY AS NEEDED FOR MIGRAINE, Normal      ergocalciferol (VITAMIN D2) 50,000 units Take 1 capsule (50,000 Units total) by mouth once a week, Starting Fri 3/26/2021, Normal      loratadine (CLARITIN) 10 mg tablet Take 1 tablet (10 mg total) by mouth daily, Starting Thu 8/5/2021, Normal      oxyCODONE-acetaminophen (PERCOCET) 5-325 mg per tablet Take 1 tablet by mouth every 6 (six) hours as needed, Starting Tue 7/18/2023, Historical Med      Probiotic Product (PROBIOTIC-10 PO) Take by mouth, Historical Med      tamsulosin (FLOMAX) 0.4 mg Take 1 capsule (0.4 mg total) by mouth daily with dinner, Starting Fri 7/21/2023, Normal           No discharge procedures on file. PDMP Review       Value Time User    PDMP Reviewed  Yes 12/22/2021  9:46 AM Molly Mason MD           ED Provider  Attending physically available and evaluated Willian Lara. I managed the patient along with the ED Attending.     Electronically Signed by         Doreen Lucas DO  08/30/23 0000

## 2023-08-29 NOTE — DISCHARGE INSTRUCTIONS
You were seen in the emergency department for left flank pain consistent with your previous kidney stones. Your bedside ultrasound did not show any dilation of your kidney. Your urine did not show any evidence of infection. Call urology tomorrow to schedule follow-up appointment. Take over-the-counter medications such as ibuprofen and Tylenol for your pain. If you have pain that is unable to be controlled at home, fever, nausea, or vomiting please return to the emergency department.

## 2023-08-29 NOTE — ED ATTENDING ATTESTATION
8/28/2023  IYennifer MD, saw and evaluated the patient. I have discussed the patient with the resident/non-physician practitioner and agree with the resident's/non-physician practitioner's findings, Plan of Care, and MDM as documented in the resident's/non-physician practitioner's note, except where noted. All available labs and Radiology studies were reviewed. I was present for key portions of any procedure(s) performed by the resident/non-physician practitioner and I was immediately available to provide assistance. At this point I agree with the current assessment done in the Emergency Department. I have conducted an independent evaluation of this patient a history and physical is as follows:  Patient with no left-sided kidney stones, coming in with kidney pain. Patient states that she has sharp, stabbing, lancinating left-sided abdominal pain consistent with prior kidney stone pain. Patient still has medications from prior kidney stones from a few weeks ago. Patient has follow-up with urology in 1 week. Patient states that she has been doing well when she started with the pain. No fevers, vomiting, or chills. Review of systems otherwise -12 systems reviewed. On exam vital signs were reviewed. Patient is awake, alert, interactive. The patient's pupils are equally round reactive to light. Oropharynx is clear with moist mucous membranes. Neck is supple and nontender with no adenopathy or JVD. Heart is regular with no murmurs, rubs, or gallops. Lungs are clear and equal with no wheezes, rales, or rhonchi. Abdomen is soft and nontender with no masses, rebound, or guarding. There is no CVA tenderness. The patient was completely exposed. There is no skin breakdown. There are no rashes or skin changes. Extremities are warm and well perfused with good pulses. The patient has normal strength, sensation, and cranial nerves. Impression: Likely kidney stones.   Patient's symptoms are improved here after taking Percocet at home. She has no hydro on bedside ultrasound, urine with no evidence of bacteriuria.   We will plan to discharge to follow-up with urology  ED Course         Critical Care Time  Procedures

## 2023-08-31 DIAGNOSIS — N20.1 URETERAL STONE: ICD-10-CM

## 2023-08-31 RX ORDER — TAMSULOSIN HYDROCHLORIDE 0.4 MG/1
0.4 CAPSULE ORAL
Qty: 30 CAPSULE | Refills: 0 | Status: SHIPPED | OUTPATIENT
Start: 2023-08-31

## 2023-09-08 ENCOUNTER — HOSPITAL ENCOUNTER (OUTPATIENT)
Dept: RADIOLOGY | Facility: IMAGING CENTER | Age: 48
End: 2023-09-08
Payer: COMMERCIAL

## 2023-09-08 DIAGNOSIS — N20.1 URETERAL STONE: ICD-10-CM

## 2023-09-08 PROCEDURE — 76775 US EXAM ABDO BACK WALL LIM: CPT

## 2023-09-08 PROCEDURE — 74018 RADEX ABDOMEN 1 VIEW: CPT

## 2023-09-18 ENCOUNTER — TELEPHONE (OUTPATIENT)
Age: 48
End: 2023-09-18

## 2023-09-18 DIAGNOSIS — N20.1 URETERAL CALCULI: Primary | ICD-10-CM

## 2023-09-18 DIAGNOSIS — N20.1 URETERAL STONE: ICD-10-CM

## 2023-09-18 RX ORDER — TAMSULOSIN HYDROCHLORIDE 0.4 MG/1
0.4 CAPSULE ORAL
Qty: 30 CAPSULE | Refills: 0 | Status: SHIPPED | OUTPATIENT
Start: 2023-09-18

## 2023-09-18 NOTE — TELEPHONE ENCOUNTER
Patient called back stating she still has questions on results. She stated she wants to speak to the doctor about it. She does not want to end up in the hospital before her follow up appoinment in October 2 . She is having some pain and its concerned about the stone being stuck. She wants to know if this can wait.       Patient can be reached at [phone]

## 2023-09-18 NOTE — TELEPHONE ENCOUNTER
Pt is under the care of: Clark Lopez    Pt to be seen on 10/02/23    Pt had test done on: 09/08/23    Pt would like to know test results     Pt can be reached at: [phone]

## 2023-09-18 NOTE — TELEPHONE ENCOUNTER
Urine testing is negative for infection, positive for large blood. She has a known distal ureteral calculi confirmed on US and KUB from 9/08. Based on her initial office visit with Tiarra Andrews and recent imaging, it appears the stone has migrated and may be close to passing. She has follow-up scheduled with Dr. Kennedy Villalobos on 10/02 to reassess her symptoms and review imaging in more detail.

## 2023-09-18 NOTE — RESULT ENCOUNTER NOTE
Please let patient know her ultrasound shows white swelling of her left kidney. It appears she probably passed one of her stones but another one is still in the ureter. I would recommend she repeat a CT scan in our network hopefully prior to her follow-up with Dr. Clifford Bradshaw.   She should continue the Flomax and aggressive hydration with water and I refilled Flomax to her pharmacy on file

## 2023-09-20 NOTE — TELEPHONE ENCOUNTER
Pt  Called and requesting call back from provider to further discuss recent test results     Pt call ecax-[phone]

## 2023-09-20 NOTE — TELEPHONE ENCOUNTER
Spoke to patient and informed her why the cat scan is requested by PARUL Seymour per review and notes from Crystal Mejia, [address] [place] [address]. She would like this cat scan done as soon as possible. Can we place STAT orders in because she is in a lot of pain and now she saw something on her liver in her results and has that going on tool Please call patient in the morning with recommendations and follow up. She wants kidney stone addressed as soon as possible.

## 2023-09-20 NOTE — TELEPHONE ENCOUNTER
Patient called with questions as of why she needs to have a CT without any explanation. She's concerned about seeing the word obstruction on her u/s and would like to speak to a clinical staff.     CB: [phone]

## 2023-09-20 NOTE — TELEPHONE ENCOUNTER
Pt called and requesting call back to further discuss her results and to further explain why she needs CT now    Pt call [phone]

## 2023-09-21 ENCOUNTER — PREP FOR PROCEDURE (OUTPATIENT)
Dept: UROLOGY | Facility: CLINIC | Age: 48
End: 2023-09-21

## 2023-09-21 ENCOUNTER — TELEPHONE (OUTPATIENT)
Dept: FAMILY MEDICINE CLINIC | Facility: CLINIC | Age: 48
End: 2023-09-21

## 2023-09-21 DIAGNOSIS — D18.03 HEPATIC HEMANGIOMA: ICD-10-CM

## 2023-09-21 DIAGNOSIS — N13.2 HYDRONEPHROSIS WITH URINARY OBSTRUCTION DUE TO URETERAL CALCULUS: ICD-10-CM

## 2023-09-21 DIAGNOSIS — Z01.818 ENCOUNTER FOR PREADMISSION TESTING: Primary | ICD-10-CM

## 2023-09-21 DIAGNOSIS — N20.1 URETEROLITHIASIS: ICD-10-CM

## 2023-09-21 DIAGNOSIS — K76.9 LIVER LESION: Primary | ICD-10-CM

## 2023-09-21 NOTE — TELEPHONE ENCOUNTER
If patient would like I can certainly place a case request for stone surgery knowing that this will likely not be scheduled for several weeks. In the meantime, he should continue with Flomax and as needed pain medication as needed. If she needs any refills I can send this to her pharmacy. She should proceed with CT as scheduled. If her pain is unable to be controlled with as needed pain medication than I would recommend she be evaluated in the emergency department. In regards to her concerns related to the indeterminate liver lesion this needs to be evaluated with contrast imaging which is not part of the CT renal stone study protocol.

## 2023-09-21 NOTE — TELEPHONE ENCOUNTER
Pt called and was informed of the msg. Pt requested a call back from a physician to discuss options. Pt also requested 1 CT scan to check for stone and liver lesion.      Please review

## 2023-09-21 NOTE — TELEPHONE ENCOUNTER
Pt called , had US done , ordered by urology. US showed abnormal liver. Pt was told to contact PCP . Please advise on US.

## 2023-09-21 NOTE — TELEPHONE ENCOUNTER
Called and [facility] for Bhavesh Singh to return call to discuss PARUL Saini's note and recommendations.

## 2023-09-21 NOTE — TELEPHONE ENCOUNTER
Returned call to patient and reviewed renal US and KUB in detail. Discussed plan for upcoming CT scan as scheduled and why this imaging was ordered. Patient would like to have case request for procedure placed to have a date scheduled in case she needs it. Will continue with Flomax and adequate hydration in the meantime to attempt to pass stone and cancel surgery if able to pass stone on her own. Patient currently not having pain, and normally when she does this is managed with prn ibuprofen/tylenol. She does have percocet on hand if needed. Reviewed ER precautions with patient. Will forward to provider to place case request. Aware SS will then contact patient within a week or two to schedule procedure.

## 2023-09-27 ENCOUNTER — HOSPITAL ENCOUNTER (OUTPATIENT)
Dept: RADIOLOGY | Facility: IMAGING CENTER | Age: 48
Discharge: HOME | End: 2023-09-27
Payer: COMMERCIAL

## 2023-09-27 DIAGNOSIS — N20.1 URETERAL CALCULI: ICD-10-CM

## 2023-09-27 PROCEDURE — 74176 CT ABD & PELVIS W/O CONTRAST: CPT

## 2023-09-27 PROCEDURE — G1004 CDSM NDSC: HCPCS

## 2023-09-27 NOTE — LETTER
[address] [place] [address]  [address] [facility] [address]      October 5, 2023    MRN: [id number]     Phone: [phone]     Dear Ms. Dcyusuf All recently had a(n) Cat Scan performed on 9/27/2023 at  [address] [place] [address] that was requested by Denisse Liang [address] [place] [address]. The study was reviewed by a radiologist, which is a physician who specializes in medical imaging. The radiologist issued a report describing his or her findings. In that report there was a finding that the radiologist felt warranted further discussion with your health care provider and that discussion would be beneficial to you. The results were sent to [address] on 10/02/2023 10:36 AM. We recommend that you contact [address] at [phone] or set up an appointment to discuss the results of the imaging test. If you have already heard from Denisse Liang [address] [place] [address] regarding the results of your study, you can disregard this letter. This letter is not meant to alarm you, but intended to encourage you to follow-up on your results with the provider that sent you for the imaging study. In addition, we have enclosed answers to frequently asked questions by other patients who have also received a letter to review results with their health care provider (see page two). Thank you for choosing [address] [place] [address] for your medical imaging needs. FREQUENTLY ASKED QUESTIONS    Why am I receiving this letter? [facility] requires us to notify patients who have findings on imaging exams that may require more testing or follow-up with a health professional within the next 3 months.         How serious is the finding on the imaging test?  This letter is sent to all patients who may need follow-up or more testing within the next 3 months. Receiving this letter does not necessarily mean you have a life-threatening imaging finding or disease. Recommendations in the radiologist’s imaging report are general in nature and it is up to your healthcare provider to say whether those recommendations make sense for your situation. You are strongly encouraged to talk to your health care provider about the results and ask whether additional steps need to be taken. Where can I get a copy of the final report for my recent radiology exam?  To get a full copy of the report you can access your records online at [email or web address] or please contact [facility] Department at [phone] Monday through Friday between 8 am and 6 pm.         What do I need to do now? Please contact your health care provider who requested the imaging study to discuss what further actions (if any) are needed. You may have already heard from (your ordering provider) in regard to this test in which case you can disregard this letter. NOTICE IN ACCORDANCE WITH THE [place] “PATIENT TEST RESULT INFORMATION ACT OF 2018”    You are receiving this notice as a result of a determination by your diagnostic imaging service that further discussions of your test results are warranted and would be beneficial to you. The complete results of your test or tests have been or will be sent to the health care practitioner that ordered the test or tests. It is recommended that you contact your health care practitioner to discuss your results as soon as possible.

## 2023-10-02 ENCOUNTER — OFFICE VISIT (OUTPATIENT)
Dept: UROLOGY | Facility: CLINIC | Age: 48
End: 2023-10-02
Payer: COMMERCIAL

## 2023-10-02 VITALS
HEART RATE: 95 BPM | HEIGHT: 62 IN | DIASTOLIC BLOOD PRESSURE: 62 MMHG | RESPIRATION RATE: 16 BRPM | OXYGEN SATURATION: 99 % | WEIGHT: 204 LBS | SYSTOLIC BLOOD PRESSURE: 102 MMHG | BODY MASS INDEX: 37.54 KG/M2 | TEMPERATURE: 98.1 F

## 2023-10-02 DIAGNOSIS — N20.1 URETERAL STONE: ICD-10-CM

## 2023-10-02 DIAGNOSIS — N20.1 URETERAL CALCULI: Primary | ICD-10-CM

## 2023-10-02 LAB
SL AMB  POCT GLUCOSE, UA: NORMAL
SL AMB LEUKOCYTE ESTERASE,UA: NORMAL
SL AMB POCT BILIRUBIN,UA: NORMAL
SL AMB POCT BLOOD,UA: NORMAL
SL AMB POCT CLARITY,UA: CLEAR
SL AMB POCT COLOR,UA: YELLOW
SL AMB POCT KETONES,UA: NORMAL
SL AMB POCT NITRITE,UA: NORMAL
SL AMB POCT PH,UA: 6
SL AMB POCT SPECIFIC GRAVITY,UA: 1.01
SL AMB POCT URINE PROTEIN: NORMAL
SL AMB POCT UROBILINOGEN: 0.2

## 2023-10-02 PROCEDURE — 99214 OFFICE O/P EST MOD 30 MIN: CPT | Performed by: UROLOGY

## 2023-10-02 PROCEDURE — 81002 URINALYSIS NONAUTO W/O SCOPE: CPT | Performed by: UROLOGY

## 2023-10-02 NOTE — RESULT ENCOUNTER NOTE
Seen in the office today by Dr. Clifford Bradshaw. Results were reviewed with her at that time.   Already has surgical case requested

## 2023-10-02 NOTE — PROGRESS NOTES
[address] [facility]  [facility]  [address] [facility], [address] [place] [address] 121  [phone]  [email or web address]      NAME: Dru Lamas  AGE: 50 y.o. SEX: female  : 1975   MRN: [id number]    DATE: 10/2/2023  TIME: 10:12 AM    Assessment and Plan: At least 7 mm left distal ureteral calculus, right currently at the level of the vessels. Intermittent left flank pain. She wishes to have this taken care of so we will set her up for cystoscopy left ureteroscopy laser lithotripsy and left ureteral stent placement. We will not treat the left renal calculi as they are small. No stones on the right. The risk of bleeding infection damage urinary tract and need for additional procedures were explained and she gives informed consent. We will schedule this. Chief Complaint     Chief Complaint   Patient presents with   • Follow-up       History of Present Illness   44-year-old woman, established patient but new to me-this are seen by Sterling TERAN 2023 developed flank pain while visiting [place] in the CT scan 2023 showed a 7 mm left UPJ calculus with mild hydronephrosis and an additional 5 mm calculus in the left UPJ. She had passed 1 stone at that time and she was now feeling pressure and twinging in the urethral region. She passed a stone during that office visit. She was set up for follow-up imaging and a visit with me. She had a CT scan 2023 which the report is not available yet but I personally looked at the films and she has punctate calculi bilaterally in the kidneys, left hydronephrosis and a 7 mm stone in the lower left ureter. This is about 4 cm above the bladder. Nothing on the right. She was in the emergency department for left flank pain about 3 weeks ago.   We personally reviewed her CT scan and there are no stones on the right, but there are the tiny stones in the left kidney and the larger stone in the left ureter. I do not think she is going to pass this or if she does it would not be for quite some time. She wishes to have this taken care of. Also today is completely negative. pH 6.0. The following portions of the patient's history were reviewed and updated as appropriate: allergies, current medications, past family history, past medical history, past social history, past surgical history and problem list.  Past Medical History:   Diagnosis Date   • Anxiety    • GERD (gastroesophageal reflux disease)     occ   • Hemorrhoids    • Kidney stone 7/18/23   • Migraine      Past Surgical History:   Procedure Laterality Date   • APPENDECTOMY  2008   • DILATION AND CURETTAGE, DIAGNOSTIC / THERAPEUTIC     • UPPER GASTROINTESTINAL ENDOSCOPY     • US GUIDED BREAST BIOPSY LEFT COMPLETE Left 11/22/2021   • VARICOSE VEIN SURGERY Left 2012     shoulder  Review of Systems   Review of Systems   Constitutional: Negative for fever. Respiratory: Negative for shortness of breath. Cardiovascular: Negative for chest pain. Gastrointestinal: Negative. Genitourinary: Negative for difficulty urinating and dysuria. Neurological: Negative.         Active Problem List     Patient Active Problem List   Diagnosis   • Chronic fatigue   • Generalized anxiety disorder   • GERD (gastroesophageal reflux disease)   • Major depression, recurrent (HCC)   • Migraine with aura and without status migrainosus, not intractable   • Varicose veins of leg with edema, unspecified laterality   • Vitamin D deficiency   • Anxiety   • H/O seasonal allergies   • Urinary tract infection without hematuria   • Dysuria   • Pressure in head   • New daily persistent headache   • Restless leg syndrome   • Insomnia   • Immunization due   • Healthcare maintenance   • Encounter for sterilization   • Chronic idiopathic constipation   • BMI 35.0-35.9,adult   • Dermatitis   • Goiter   • Breast cancer screening by mammogram   • Other dysphagia   • Other hyperlipidemia   • Class 2 obesity due to excess calories without serious comorbidity with body mass index (BMI) of 36.0 to 36.9 in adult   • Other sleep apnea   • Ureteral stone       Objective   /62 (BP Location: Left arm, Patient Position: Sitting, Cuff Size: Large)   Pulse 95   Temp 98.1 °F (36.7 °C) (Temporal)   Resp 16   Ht 5' 2" (1.575 m)   Wt 92.5 kg (204 lb)   SpO2 99%   BMI 37.31 kg/m²     Physical Exam  Vitals reviewed. Constitutional:       Appearance: Normal appearance. HENT:      Head: Normocephalic and atraumatic. Eyes:      Extraocular Movements: Extraocular movements intact. Cardiovascular:      Rate and Rhythm: Normal rate and regular rhythm. Heart sounds: Normal heart sounds. No murmur heard. No friction rub. No gallop. Pulmonary:      Effort: Pulmonary effort is normal.      Breath sounds: Normal breath sounds. Musculoskeletal:         General: No swelling or tenderness. Normal range of motion. Cervical back: Normal range of motion. Right lower leg: No edema. Left lower leg: No edema. Skin:     Coloration: Skin is not jaundiced or pale. Neurological:      General: No focal deficit present. Mental Status: She is alert and oriented to person, place, and time. Psychiatric:         Mood and Affect: Mood normal.         Behavior: Behavior normal.         Thought Content:  Thought content normal.         Judgment: Judgment normal.             Current Medications     Current Outpatient Medications:   •  Azelaic Acid (Finacea) 15 % cream, Apply 1 Application topically 2 (two) times a day, Disp: 30 g, Rfl: 1  •  oxyCODONE-acetaminophen (PERCOCET) 5-325 mg per tablet, Take 1 tablet by mouth every 6 (six) hours as needed, Disp: , Rfl:   •  Probiotic Product (PROBIOTIC-10 PO), Take by mouth, Disp: , Rfl:   •  tamsulosin (FLOMAX) 0.4 mg, Take 1 capsule (0.4 mg total) by mouth daily with dinner, Disp: 30 capsule, Rfl: 0  • ALPRAZolam (XANAX) 1 mg tablet, Take 1 tablet (1 mg total) by mouth daily as needed for anxiety (Patient not taking: Reported on 6/13/2023), Disp: 30 tablet, Rfl: 0  •  butalbital-acetaminophen-caffeine (FIORICET,ESGIC) -40 mg per tablet, TAKE 1 TABLET BY MOUTH 3 TIMES A DAY AS NEEDED FOR MIGRAINE (Patient not taking: Reported on 7/21/2023), Disp: 60 tablet, Rfl: 1  •  ergocalciferol (VITAMIN D2) 50,000 units, Take 1 capsule (50,000 Units total) by mouth once a week (Patient not taking: Reported on 7/21/2023), Disp: 4 capsule, Rfl: 2  •  loratadine (CLARITIN) 10 mg tablet, Take 1 tablet (10 mg total) by mouth daily (Patient not taking: Reported on 7/21/2023), Disp: 30 tablet, Rfl: 3        Norm Ny MD

## 2023-10-02 NOTE — H&P
[address]  [facility]  [address] [place] [address]  [phone]  [email or web address]      NAME: Pretty Carr  AGE: 50 y.o. SEX: female  : 1975   MRN: [id number]    DATE: 10/2/2023  TIME: 10:12 AM    Assessment and Plan: At least 7 mm left distal ureteral calculus, right currently at the level of the vessels. Intermittent left flank pain. She wishes to have this taken care of so we will set her up for cystoscopy left ureteroscopy laser lithotripsy and left ureteral stent placement. We will not treat the left renal calculi as they are small. No stones on the right. The risk of bleeding infection damage urinary tract and need for additional procedures were explained and she gives informed consent. We will schedule this. Chief Complaint     Chief Complaint   Patient presents with   • Follow-up       History of Present Illness   45-year-old woman, established patient but new to me-this are seen by Bel TERAN 2023 developed flank pain while visiting [place] in the CT scan 2023 showed a 7 mm left UPJ calculus with mild hydronephrosis and an additional 5 mm calculus in the left UPJ. She had passed 1 stone at that time and she was now feeling pressure and twinging in the urethral region. She passed a stone during that office visit. She was set up for follow-up imaging and a visit with me. She had a CT scan 2023 which the report is not available yet but I personally looked at the films and she has punctate calculi bilaterally in the kidneys, left hydronephrosis and a 7 mm stone in the lower left ureter. This is about 4 cm above the bladder. Nothing on the right. She was in the emergency department for left flank pain about 3 weeks ago.   We personally reviewed her CT scan and there are no stones on the right, but there are the tiny stones in the left kidney and the larger stone in the left ureter. I do not think she is going to pass this or if she does it would not be for quite some time. She wishes to have this taken care of. Also today is completely negative. pH 6.0. The following portions of the patient's history were reviewed and updated as appropriate: allergies, current medications, past family history, past medical history, past social history, past surgical history and problem list.  Past Medical History:   Diagnosis Date   • Anxiety    • GERD (gastroesophageal reflux disease)     occ   • Hemorrhoids    • Kidney stone 7/18/23   • Migraine      Past Surgical History:   Procedure Laterality Date   • APPENDECTOMY  2008   • DILATION AND CURETTAGE, DIAGNOSTIC / THERAPEUTIC     • UPPER GASTROINTESTINAL ENDOSCOPY     • US GUIDED BREAST BIOPSY LEFT COMPLETE Left 11/22/2021   • VARICOSE VEIN SURGERY Left 2012     shoulder  Review of Systems   Review of Systems   Constitutional: Negative for fever. Respiratory: Negative for shortness of breath. Cardiovascular: Negative for chest pain. Gastrointestinal: Negative. Genitourinary: Negative for difficulty urinating and dysuria. Neurological: Negative.         Active Problem List     Patient Active Problem List   Diagnosis   • Chronic fatigue   • Generalized anxiety disorder   • GERD (gastroesophageal reflux disease)   • Major depression, recurrent (HCC)   • Migraine with aura and without status migrainosus, not intractable   • Varicose veins of leg with edema, unspecified laterality   • Vitamin D deficiency   • Anxiety   • H/O seasonal allergies   • Urinary tract infection without hematuria   • Dysuria   • Pressure in head   • New daily persistent headache   • Restless leg syndrome   • Insomnia   • Immunization due   • Healthcare maintenance   • Encounter for sterilization   • Chronic idiopathic constipation   • BMI 35.0-35.9,adult   • Dermatitis   • Goiter   • Breast cancer screening by mammogram   • Other dysphagia   • Other hyperlipidemia   • Class 2 obesity due to excess calories without serious comorbidity with body mass index (BMI) of 36.0 to 36.9 in adult   • Other sleep apnea   • Ureteral stone       Objective   /62 (BP Location: Left arm, Patient Position: Sitting, Cuff Size: Large)   Pulse 95   Temp 98.1 °F (36.7 °C) (Temporal)   Resp 16   Ht 5' 2" (1.575 m)   Wt 92.5 kg (204 lb)   SpO2 99%   BMI 37.31 kg/m²     Physical Exam  Vitals reviewed. Constitutional:       Appearance: Normal appearance. HENT:      Head: Normocephalic and atraumatic. Eyes:      Extraocular Movements: Extraocular movements intact. Cardiovascular:      Rate and Rhythm: Normal rate and regular rhythm. Heart sounds: Normal heart sounds. No murmur heard. No friction rub. No gallop. Pulmonary:      Effort: Pulmonary effort is normal.      Breath sounds: Normal breath sounds. Musculoskeletal:         General: No swelling or tenderness. Normal range of motion. Cervical back: Normal range of motion. Right lower leg: No edema. Left lower leg: No edema. Skin:     Coloration: Skin is not jaundiced or pale. Neurological:      General: No focal deficit present. Mental Status: She is alert and oriented to person, place, and time. Psychiatric:         Mood and Affect: Mood normal.         Behavior: Behavior normal.         Thought Content:  Thought content normal.         Judgment: Judgment normal.             Current Medications     Current Outpatient Medications:   •  Azelaic Acid (Finacea) 15 % cream, Apply 1 Application topically 2 (two) times a day, Disp: 30 g, Rfl: 1  •  oxyCODONE-acetaminophen (PERCOCET) 5-325 mg per tablet, Take 1 tablet by mouth every 6 (six) hours as needed, Disp: , Rfl:   •  Probiotic Product (PROBIOTIC-10 PO), Take by mouth, Disp: , Rfl:   •  tamsulosin (FLOMAX) 0.4 mg, Take 1 capsule (0.4 mg total) by mouth daily with dinner, Disp: 30 capsule, Rfl: 0  • ALPRAZolam (XANAX) 1 mg tablet, Take 1 tablet (1 mg total) by mouth daily as needed for anxiety (Patient not taking: Reported on 6/13/2023), Disp: 30 tablet, Rfl: 0  •  butalbital-acetaminophen-caffeine (FIORICET,ESGIC) -40 mg per tablet, TAKE 1 TABLET BY MOUTH 3 TIMES A DAY AS NEEDED FOR MIGRAINE (Patient not taking: Reported on 7/21/2023), Disp: 60 tablet, Rfl: 1  •  ergocalciferol (VITAMIN D2) 50,000 units, Take 1 capsule (50,000 Units total) by mouth once a week (Patient not taking: Reported on 7/21/2023), Disp: 4 capsule, Rfl: 2  •  loratadine (CLARITIN) 10 mg tablet, Take 1 tablet (10 mg total) by mouth daily (Patient not taking: Reported on 7/21/2023), Disp: 30 tablet, Rfl: 3        Forrest Ornelas MD

## 2023-10-04 RX ORDER — MULTIVITAMIN WITH IRON
TABLET ORAL
COMMUNITY

## 2023-10-04 NOTE — PRE-PROCEDURE INSTRUCTIONS
Pre-Surgery Instructions:   Medication Instructions   • ALPRAZolam (XANAX) 1 mg tablet Uses PRN- OK to take day of surgery   • Azelaic Acid (Finacea) 15 % cream Hold day of surgery. • Magnesium 250 MG TABS Take night before surgery   • oxyCODONE-acetaminophen (PERCOCET) 5-325 mg per tablet Uses PRN- OK to take day of surgery   • Probiotic Product (PROBIOTIC-10 PO) Stop taking 7 days prior to surgery. • tamsulosin (FLOMAX) 0.4 mg Take night before surgery    Medication instructions for day surgery reviewed. Please use only a sip of water to take your instructed medications. Avoid all over the counter vitamins, supplements and NSAIDS for one week prior to surgery per anesthesia guidelines. Tylenol is ok to take as needed. You will receive a call one business day prior to surgery with an arrival time and hospital directions. If your surgery is scheduled on a Monday, the hospital will be calling you on the Friday prior to your surgery. If you have not heard from anyone by 8pm, please call the hospital supervisor through the hospital  at [phone]. Cami Brown [phone]). Do not eat or drink anything after midnight the night before your surgery, including candy, mints, lifesavers, or chewing gum. Do not drink alcohol 24hrs before your surgery. Try not to smoke at least 24hrs before your surgery. Follow the pre surgery showering instructions as listed in the [facility] Surgical Experience Booklet” or otherwise provided by your surgeon's office. Do not shave the surgical area 24 hours before surgery. Do not apply any lotions, creams, including makeup, cologne, deodorant, or perfumes after showering on the day of your surgery. No contact lenses, eye make-up, or artificial eyelashes. Remove nail polish, including gel polish, and any artificial, gel, or acrylic nails if possible. Remove all jewelry including rings and body piercing jewelry. Wear causal clothing that is easy to take on and off. Consider your type of surgery. Keep any valuables, jewelry, piercings at home. Please bring any specially ordered equipment (sling, braces) if indicated. Arrange for a responsible person to drive you to and from the hospital on the day of your surgery. Visitor Guidelines discussed. Call the surgeon's office with any new illnesses, exposures, or additional questions prior to surgery. Please reference your [facility] Surgical Experience Booklet” for additional information to prepare for your upcoming surgery. Pt instructed to stop nsaids and supplements one week prior to surgery. Pt verbalized understanding of shower and med instructions.

## 2023-10-06 ENCOUNTER — APPOINTMENT (OUTPATIENT)
Dept: LAB | Facility: IMAGING CENTER | Age: 48
End: 2023-10-06
Payer: COMMERCIAL

## 2023-10-06 DIAGNOSIS — N20.1 URETERAL STONE: ICD-10-CM

## 2023-10-06 DIAGNOSIS — Z01.818 ENCOUNTER FOR PREADMISSION TESTING: ICD-10-CM

## 2023-10-06 LAB
ANION GAP SERPL CALCULATED.3IONS-SCNC: 8 MMOL/L
BASOPHILS # BLD AUTO: 0.03 THOUSANDS/ÂΜL (ref 0–0.1)
BASOPHILS NFR BLD AUTO: 0 % (ref 0–1)
BUN SERPL-MCNC: 18 MG/DL (ref 5–25)
CALCIUM SERPL-MCNC: 9.2 MG/DL (ref 8.4–10.2)
CHLORIDE SERPL-SCNC: 103 MMOL/L (ref 96–108)
CO2 SERPL-SCNC: 27 MMOL/L (ref 21–32)
CREAT SERPL-MCNC: 0.64 MG/DL (ref 0.6–1.3)
EOSINOPHIL # BLD AUTO: 0.11 THOUSAND/ÂΜL (ref 0–0.61)
EOSINOPHIL NFR BLD AUTO: 2 % (ref 0–6)
ERYTHROCYTE [DISTWIDTH] IN BLOOD BY AUTOMATED COUNT: 14.3 % (ref 11.6–15.1)
GFR SERPL CREATININE-BSD FRML MDRD: 105 ML/MIN/1.73SQ M
GLUCOSE P FAST SERPL-MCNC: 77 MG/DL (ref 65–99)
HCT VFR BLD AUTO: 39.5 % (ref 34.8–46.1)
HGB BLD-MCNC: 12.1 G/DL (ref 11.5–15.4)
IMM GRANULOCYTES # BLD AUTO: 0.02 THOUSAND/UL (ref 0–0.2)
IMM GRANULOCYTES NFR BLD AUTO: 0 % (ref 0–2)
LYMPHOCYTES # BLD AUTO: 2.01 THOUSANDS/ÂΜL (ref 0.6–4.47)
LYMPHOCYTES NFR BLD AUTO: 29 % (ref 14–44)
MCH RBC QN AUTO: 24.1 PG (ref 26.8–34.3)
MCHC RBC AUTO-ENTMCNC: 30.6 G/DL (ref 31.4–37.4)
MCV RBC AUTO: 79 FL (ref 82–98)
MONOCYTES # BLD AUTO: 0.46 THOUSAND/ÂΜL (ref 0.17–1.22)
MONOCYTES NFR BLD AUTO: 7 % (ref 4–12)
NEUTROPHILS # BLD AUTO: 4.42 THOUSANDS/ÂΜL (ref 1.85–7.62)
NEUTS SEG NFR BLD AUTO: 62 % (ref 43–75)
NRBC BLD AUTO-RTO: 0 /100 WBCS
PLATELET # BLD AUTO: 215 THOUSANDS/UL (ref 149–390)
PMV BLD AUTO: 12.1 FL (ref 8.9–12.7)
POTASSIUM SERPL-SCNC: 4 MMOL/L (ref 3.5–5.3)
RBC # BLD AUTO: 5.02 MILLION/UL (ref 3.81–5.12)
SODIUM SERPL-SCNC: 138 MMOL/L (ref 135–147)
WBC # BLD AUTO: 7.05 THOUSAND/UL (ref 4.31–10.16)

## 2023-10-06 PROCEDURE — 36415 COLL VENOUS BLD VENIPUNCTURE: CPT

## 2023-10-06 PROCEDURE — 87086 URINE CULTURE/COLONY COUNT: CPT

## 2023-10-06 PROCEDURE — 85025 COMPLETE CBC W/AUTO DIFF WBC: CPT

## 2023-10-06 PROCEDURE — 80048 BASIC METABOLIC PNL TOTAL CA: CPT

## 2023-10-07 NOTE — H&P
History and physical examination    I saw the patient in the holding area and reviewed the most recent CT report and films with her discussed proposed procedure described step-by-step, performed history and physical and I am in agreement with the history and physical below. I discussed risks of bleeding infection retained stone or stone fragments perforation stricture contracture need for additional operative procedures. I answered all patient questions and the patient provided verbal and signed informed consent. Most recent CT report appears below           CT renal stone study abdomen pelvis wo contrast  Status: Final result     PACS Images     Show images for CT renal stone study abdomen pelvis wo contrast    CT renal stone study abdomen pelvis wo contrast: Result Notes     PARUL Lopez   10/2/2023  1:04 PM EDT       Seen in the office today by Dr. Juliann Lu. Results were reviewed with her at that time. Already has surgical case requested            Study Result    Narrative & Impression   CT ABDOMEN AND PELVIS WITHOUT IV CONTRAST - LOW DOSE RENAL STONE     INDICATION:   N20.1: Calculus of ureter. Right-sided flank pain, rule out nephrolithiasis. History of left-sided nephrolithiasis. COMPARISON: CT abdomen pelvis 7/18/2023. TECHNIQUE:  Low radiation dose thin section CT examination of the abdomen and pelvis was performed without intravenous or oral contrast according to a protocol specifically designed to evaluate for urinary tract calculus. Axial, sagittal, and coronal 2D   reformatted images were created from the source data and submitted for interpretation. Evaluation for pathology in the abdomen and pelvis that is unrelated to urinary tract calculi is limited. Radiation dose length product (DLP) for this visit:  717 mGy-cm .   This examination, like all CT scans performed in the [facility], was performed utilizing techniques to minimize radiation dose exposure, including the use of iterative   reconstruction and automated exposure control. URINARY TRACT FINDINGS:     RIGHT KIDNEY AND URETER:  No urinary tract calculi. No hydronephrosis or hydroureter. LEFT KIDNEY AND URETER: Distal propagation of previously noted left UPJ calculus, with 2 calculi measuring 6 mm and 1 mm seen in the distal (pelvic segment) ureter (series 3 images 158 and 163; series 80,248/66, 3). Previously seen left UVJ calculus is   not visualized, likely passed. Persistent left side hydroureteronephrosis and mild perinephric stranding. Unchanged nonobstructing intrarenal calyceal calculi, largest measuring 3 mm in the lower pole. URINARY BLADDER:  Unremarkable. ADDITIONAL FINDINGS:     LOWER CHEST:  No clinically significant abnormality identified in the visualized lower chest.     SOLID VISCERA: Hypoattenuating lesions in the liver measuring 2.0 x 3.4 x 3.2 cm in segment 2, 2.9 x 3.1 x 2.5 cm in segment 6, and 1.9 x 1.1 cm in segment 5 (series 3 images 32, 56 and 40) without significant interval change from prior CT. These remain   indeterminate and not adequately characterized on unenhanced CT study. Limited low radiation dose noncontrast CT evaluation demonstrates no clinically significant abnormality of the imaged portions of the spleen, pancreas, or adrenal glands. GALLBLADDER/BILIARY TREE:  No calcified gallstones. No pericholecystic inflammatory change. No biliary dilatation. STOMACH AND BOWEL:  There is colonic diverticulosis without evidence of acute diverticulitis. APPENDIX: Normal.     ABDOMINOPELVIC CAVITY:  No ascites. No pneumoperitoneum. No lymphadenopathy. VESSELS: Unremarkable for patient's age. REPRODUCTIVE ORGANS:  Unremarkable for patient's age. ABDOMINAL WALL/INGUINAL REGIONS:  Unremarkable. OSSEOUS STRUCTURES:  No acute fracture or destructive osseous lesion. Mild spinal degenerative changes are noted. IMPRESSION:     1. Distal propagation of previously noted left UPJ calculus, with 2 calculi measuring 6 mm and 1 mm seen in the distal (pelvic segment) ureter. Previously seen left UVJ calculus is not visualized, likely passed. Persistent left side   hydroureteronephrosis and mild perinephric stranding. 2. Unchanged small nonobstructing left renal calculi. 3. Indeterminate hypoattenuating liver lesions, similar to prior CT. Further characterization with dedicated liver protocol contrast-enhanced MRI is recommended. The study was marked in EPIC for significant notification. Resident: Tarun Sanz, the attending radiologist, have reviewed the images and agree with the final report above. Workstation performed: [id number]        Imaging    CT renal stone study abdomen pelvis wo contrast (Order: [id number]) - 9/27/2023    Result History    CT renal stone study abdomen pelvis wo contrast (Order #[id number]) on 10/2/2023 - Order Result History Report    Order Report     Order Details      Order Questions    Question Answer   Pregnant? Unknown   What is the patient's sedation requirement? If Medication for Claustrophobia is selected, order medication at this point. No Sedation   Release to patient through [employer]hart Immediate   Exam reason Left ureteral calculi   Note: Enter reason for exam              Result Information    Status Priority Source   Final result (10/2/2023 10:36 AM) Routine        CT renal stone study abdomen pelvis wo contrast: Result Notes     PARUL Alexander   10/2/2023  1:04 PM EDT         Seen in the office today by Dr. Darylene Penman. Results were reviewed with her at that time.   Already has surgical case requested            Reason for Exam    Left ureteral calculi; Nephrolithiasis, symptomatic/complicated; Left ureteral calculi   Dx: Ureteral calculi [N20.1 (ICD-10-CM)]     All Reviewers List    PARUL Alexander on 10/2/2023  1:04 PM         CT renal stone study abdomen pelvis wo contrast: Patient Communication     Add Comments   Seen         Signed by    Signed Date/Time  Phone Pager   Norberto Randolph 10/02/2023 10:36 [phone]        Exam Information    Status Exam Begun  Exam Ended  Performing Tech   Final [99] 2023 12:23 2023 12:33 Jani Sanabria       Screening Form Questions    No questions have been answered for this form. External Results Report    Open External Results Report      Encounter    View Encounter                     Patient Care Timeline    No data selected in time range    Pre-op Summary    Pre-op             Recovery Summary    Recovery                 Routing History    None             Maggie Devine MD  Physician  Specialty:  Urology  Progress Notes     Signed  Encounter Date:  10/2/2023     Signed        Expand All Collapse All                                                                                                                                                                                                                                                                                                                                                                                        [address] [facility]  [address] [address] [place] [address]  [phone]  [email or web address]        NAME: Hebert Alonzo  AGE: 50 y.o. SEX: female  : 1975            MRN: [id number]     DATE: 10/2/2023  TIME: 10:12 AM     Assessment and Plan: At least 7 mm left distal ureteral calculus, right currently at the level of the vessels. Intermittent left flank pain. She wishes to have this taken care of so we will set her up for cystoscopy left ureteroscopy laser lithotripsy and left ureteral stent placement. We will not treat the left renal calculi as they are small. No stones on the right.   The risk of bleeding infection damage urinary tract and need for additional procedures were explained and she gives informed consent. We will schedule this. Chief Complaint          Chief Complaint   Patient presents with    Follow-up         History of Present Illness   80-year-old woman, established patient but new to me-this are seen by Oly TERAN July 21, 2023 developed flank pain while visiting [place] in the CT scan July 18, 2023 showed a 7 mm left UPJ calculus with mild hydronephrosis and an additional 5 mm calculus in the left UPJ. She had passed 1 stone at that time and she was now feeling pressure and twinging in the urethral region. She passed a stone during that office visit. She was set up for follow-up imaging and a visit with me. She had a CT scan September 27, 2023 which the report is not available yet but I personally looked at the films and she has punctate calculi bilaterally in the kidneys, left hydronephrosis and a 7 mm stone in the lower left ureter. This is about 4 cm above the bladder. Nothing on the right. She was in the emergency department for left flank pain about 3 weeks ago. We personally reviewed her CT scan and there are no stones on the right, but there are the tiny stones in the left kidney and the larger stone in the left ureter. I do not think she is going to pass this or if she does it would not be for quite some time. She wishes to have this taken care of. Also today is completely negative. pH 6.0.         The following portions of the patient's history were reviewed and updated as appropriate: allergies, current medications, past family history, past medical history, past social history, past surgical history and problem list.  Medical History        Past Medical History:   Diagnosis Date    Anxiety      GERD (gastroesophageal reflux disease)       occ    Hemorrhoids      Kidney stone 7/18/23    Migraine           Surgical History         Past Surgical History:   Procedure Laterality Date APPENDECTOMY   2008    DILATION AND CURETTAGE, DIAGNOSTIC / THERAPEUTIC        UPPER GASTROINTESTINAL ENDOSCOPY        US GUIDED BREAST BIOPSY LEFT COMPLETE Left 11/22/2021    VARICOSE VEIN SURGERY Left 2012         shoulder  Review of Systems   Review of Systems   Constitutional: Negative for fever. Respiratory: Negative for shortness of breath. Cardiovascular: Negative for chest pain. Gastrointestinal: Negative. Genitourinary: Negative for difficulty urinating and dysuria. Neurological: Negative. Active Problem List          Patient Active Problem List   Diagnosis    Chronic fatigue    Generalized anxiety disorder    GERD (gastroesophageal reflux disease)    Major depression, recurrent (HCC)    Migraine with aura and without status migrainosus, not intractable    Varicose veins of leg with edema, unspecified laterality    Vitamin D deficiency    Anxiety    H/O seasonal allergies    Urinary tract infection without hematuria    Dysuria    Pressure in head    New daily persistent headache    Restless leg syndrome    Insomnia    Immunization due    Healthcare maintenance    Encounter for sterilization    Chronic idiopathic constipation    BMI 35.0-35.9,adult    Dermatitis    Goiter    Breast cancer screening by mammogram    Other dysphagia    Other hyperlipidemia    Class 2 obesity due to excess calories without serious comorbidity with body mass index (BMI) of 36.0 to 36.9 in adult    Other sleep apnea    Ureteral stone         Objective   /62 (BP Location: Left arm, Patient Position: Sitting, Cuff Size: Large)   Pulse 95   Temp 98.1 °F (36.7 °C) (Temporal)   Resp 16   Ht 5' 2" (1.575 m)   Wt 92.5 kg (204 lb)   SpO2 99%   BMI 37.31 kg/m²      Physical Exam  Vitals reviewed. Constitutional:       Appearance: Normal appearance. HENT:      Head: Normocephalic and atraumatic. Eyes:      Extraocular Movements: Extraocular movements intact.    Cardiovascular:      Rate and Rhythm: Normal rate and regular rhythm. Heart sounds: Normal heart sounds. No murmur heard. No friction rub. No gallop. Pulmonary:      Effort: Pulmonary effort is normal.      Breath sounds: Normal breath sounds. Musculoskeletal:         General: No swelling or tenderness. Normal range of motion. Cervical back: Normal range of motion. Right lower leg: No edema. Left lower leg: No edema. Skin:     Coloration: Skin is not jaundiced or pale. Neurological:      General: No focal deficit present. Mental Status: She is alert and oriented to person, place, and time. Psychiatric:         Mood and Affect: Mood normal.         Behavior: Behavior normal.         Thought Content:  Thought content normal.         Judgment: Judgment normal.                  Current Medications      Current Outpatient Medications:     Azelaic Acid (Finacea) 15 % cream, Apply 1 Application topically 2 (two) times a day, Disp: 30 g, Rfl: 1    oxyCODONE-acetaminophen (PERCOCET) 5-325 mg per tablet, Take 1 tablet by mouth every 6 (six) hours as needed, Disp: , Rfl:     Probiotic Product (PROBIOTIC-10 PO), Take by mouth, Disp: , Rfl:     tamsulosin (FLOMAX) 0.4 mg, Take 1 capsule (0.4 mg total) by mouth daily with dinner, Disp: 30 capsule, Rfl: 0    ALPRAZolam (XANAX) 1 mg tablet, Take 1 tablet (1 mg total) by mouth daily as needed for anxiety (Patient not taking: Reported on 6/13/2023), Disp: 30 tablet, Rfl: 0    butalbital-acetaminophen-caffeine (FIORICET,ESGIC) -40 mg per tablet, TAKE 1 TABLET BY MOUTH 3 TIMES A DAY AS NEEDED FOR MIGRAINE (Patient not taking: Reported on 7/21/2023), Disp: 60 tablet, Rfl: 1    ergocalciferol (VITAMIN D2) 50,000 units, Take 1 capsule (50,000 Units total) by mouth once a week (Patient not taking: Reported on 7/21/2023), Disp: 4 capsule, Rfl: 2    loratadine (CLARITIN) 10 mg tablet, Take 1 tablet (10 mg total) by mouth daily (Patient not taking: Reported on 7/21/2023), Disp: 30 tablet, Rfl: 3           Smith Cotton MD                     Electronically signed by Smith Cotton MD at 10/2/2023 10:26 AM  Office Visit on 10/2/2023    Office Visit on 10/2/2023    Note viewed by patient  Additional Documentation    Vitals:  /62 (BP Location: Left arm, Patient Position: Sitting, Cuff Size: Large)  Pulse 95  Temp 98.1 °F (36.7 °C) (Temporal)  Resp 16  Ht 5' 2" (1.575 m)  Wt 92.5 kg (204 lb)  SpO2 99%  BMI 37.31 kg/m²  BSA 1.93 m²   Flowsheets:  Vitals Reassessment     SmartForms:   [facility] PCMH/PCSP WRAP UP REQUIREMENTS ADVANCED     Encounter Info:  Billing Info,  History,  Allergies,  Detailed Report       Orders Placed      POCT urine dip    Urine culture  All Encounter Results  Medication Changes      None  Medication List  Visit Diagnoses      Ureteral calculi    Ureteral stone  Problem List

## 2023-10-07 NOTE — DISCHARGE INSTRUCTIONS
Ureteral Stent Placement   WHAT YOU NEED TO KNOW:   Ureteral stent placement is a procedure to open a blocked or narrow ureter. The ureter is the tube that carries urine from your kidney into your bladder. A stent is a thin hollow plastic tube used to hold your ureter open and allow urine to flow. The stent may stay in for several weeks. Long-term stents will stay in longer and need to be replaced within a certain period of time. DISCHARGE INSTRUCTIONS:   Seek care immediately if:   You urinate very little or not at all. You have severe pain in your abdomen, even after you take medicine. You have heavy bleeding from your urethra. You see large blood clots in your urine, or your urine is bright red. Contact your healthcare provider or urologist if:   You have a fever or chills. You feel like you need to urinate very often. Your symptoms get worse, or you develop new symptoms. You have questions or concerns about your condition or care. Medicines: You may  need any of the following:  Acetaminophen  decreases pain and fever. It is available without a doctor's order. Ask how much to take and how often to take it. Follow directions. Read the labels of all other medicines you are using to see if they also contain acetaminophen, or ask your doctor or pharmacist. Acetaminophen can cause liver damage if not taken correctly. Prescription pain medicine  may be given. Ask your healthcare provider how to take this medicine safely. Some prescription pain medicines contain acetaminophen. Do not take other medicines that contain acetaminophen without talking to your healthcare provider. Too much acetaminophen may cause liver damage. Prescription pain medicine may cause constipation. Ask your healthcare provider how to prevent or treat constipation. Antibiotics  help prevent or treat bacterial infections. Your healthcare provider may prescribe these for you while you have a ureteral stent.      Take your medicine as directed. Contact your healthcare provider if you think your medicine is not helping or if you have side effects. Tell your provider if you are allergic to any medicine. Keep a list of the medicines, vitamins, and herbs you take. Include the amounts, and when and why you take them. Bring the list or the pill bottles to follow-up visits. Carry your medicine list with you in case of an emergency. Self-care:   Drink liquids as directed. Liquids will help flush your urinary tract and prevent infection. Ask your healthcare provider how much liquid to drink each day and which liquids are best for you. Ask when you can return to daily activities. You may be able to return to normal activities the day after your stent placement. Follow up with your urologist as directed: You will need regular follow-up visits with your urologist as long as you have a stent. He or she will check to make sure the stent is working properly. He or she will remove your temporary stent in several weeks. Your provider may do urine cultures to check for infection. Write down your questions so you remember to ask them during your visits. © Copyright [facility] 2023 Information is for End User's use only and may not be sold, redistributed or otherwise used for commercial purposes. The above information is an  only. It is not intended as medical advice for individual conditions or treatments. Talk to your doctor, nurse or pharmacist before following any medical regimen to see if it is safe and effective for you.

## 2023-10-08 ENCOUNTER — OFFICE VISIT (OUTPATIENT)
Dept: URGENT CARE | Facility: MEDICAL CENTER | Age: 48
End: 2023-10-08
Payer: COMMERCIAL

## 2023-10-08 ENCOUNTER — NURSE TRIAGE (OUTPATIENT)
Dept: OTHER | Facility: OTHER | Age: 48
End: 2023-10-08

## 2023-10-08 VITALS
BODY MASS INDEX: 38.09 KG/M2 | OXYGEN SATURATION: 97 % | RESPIRATION RATE: 18 BRPM | HEIGHT: 62 IN | SYSTOLIC BLOOD PRESSURE: 114 MMHG | TEMPERATURE: 97.9 F | WEIGHT: 207 LBS | DIASTOLIC BLOOD PRESSURE: 81 MMHG | HEART RATE: 78 BPM

## 2023-10-08 DIAGNOSIS — R39.9 UTI SYMPTOMS: Primary | ICD-10-CM

## 2023-10-08 DIAGNOSIS — N20.0 NEPHROLITHIASIS: ICD-10-CM

## 2023-10-08 LAB
BACTERIA UR CULT: NORMAL
SL AMB  POCT GLUCOSE, UA: NORMAL
SL AMB LEUKOCYTE ESTERASE,UA: NORMAL
SL AMB POCT BILIRUBIN,UA: 40
SL AMB POCT BLOOD,UA: NORMAL
SL AMB POCT CLARITY,UA: CLEAR
SL AMB POCT COLOR,UA: YELLOW
SL AMB POCT KETONES,UA: NORMAL
SL AMB POCT NITRITE,UA: NORMAL
SL AMB POCT PH,UA: 7
SL AMB POCT SPECIFIC GRAVITY,UA: 1.01
SL AMB POCT URINE PROTEIN: NORMAL
SL AMB POCT UROBILINOGEN: 0.2

## 2023-10-08 PROCEDURE — 99213 OFFICE O/P EST LOW 20 MIN: CPT | Performed by: PHYSICIAN ASSISTANT

## 2023-10-08 PROCEDURE — 81002 URINALYSIS NONAUTO W/O SCOPE: CPT | Performed by: PHYSICIAN ASSISTANT

## 2023-10-08 PROCEDURE — 87086 URINE CULTURE/COLONY COUNT: CPT | Performed by: PHYSICIAN ASSISTANT

## 2023-10-08 RX ORDER — CIPROFLOXACIN 500 MG/1
500 TABLET, FILM COATED ORAL EVERY 12 HOURS SCHEDULED
Qty: 14 TABLET | Refills: 0 | Status: SHIPPED | OUTPATIENT
Start: 2023-10-08 | End: 2023-10-13

## 2023-10-08 NOTE — PATIENT INSTRUCTIONS
1. Increase fluids  2. Motrin as needed for pain  3. Take Cipro 500mg twice daily x 7 days  4. Follow-up with urine culture results, continue cipro if +.

## 2023-10-08 NOTE — TELEPHONE ENCOUNTER
Reason for Disposition  • Urinating more frequently than usual (i.e., frequency)    Answer Assessment - Initial Assessment Questions  1. SYMPTOM: "What's the main symptom you're concerned about?" (e.g., frequency, incontinence)      Minor burning present    2. ONSET: "When did the symptoms start?"      Since last week    3. PAIN: "Is there any pain?" If Yes, ask: "How bad is it?" (Scale: 1-10; mild, moderate, severe)      No pain now, but does have a kidney stone - upcoming urology procedure on 10/13    4. CAUSE: "What do you think is causing the symptoms?"      Patient thinks she has a UTI    5. OTHER SYMPTOMS: "Do you have any other symptoms?" (e.g., fever, flank pain, blood in urine, pain with urination)      No fevers or other symptoms    Protocols used: [facility]    Care advice and recommendations given. Instructed what to monitor for and when to call back. Questions and concerns addressed. Patient verbalized understanding.

## 2023-10-08 NOTE — TELEPHONE ENCOUNTER
Regarding: urinalysis results/possible UTI/stone surgery 10/13/23  ----- Message from Mary Sahu sent at 10/8/2023  2:39 PM EDT -----  Pt called in and stated, "I would like to go over my urinalysis results. I think I have the start of a UTI.  I also have stone surgery on 10/13/23 and I am worried this might interfere."

## 2023-10-08 NOTE — PROGRESS NOTES
[facility]        NAME: Silva He is a 50 y.o. female  : 1975    MRN: [id number]  DATE: 2023  TIME: 6:10 PM    Assessment and Plan   UTI symptoms [R39.9]  1. UTI symptoms  POCT urine dip    Urine culture    ciprofloxacin (CIPRO) 500 mg tablet      2. Nephrolithiasis  ciprofloxacin (CIPRO) 500 mg tablet            Patient Instructions     1. Increase fluids  2. Motrin as needed for pain  3. Take Cipro 500mg twice daily x 7 days  4. Follow-up with urine culture results, continue cipro if +. Chief Complaint     Chief Complaint   Patient presents with   • Possible UTI     Pt. C/O bladder pressure for the past day. Is scheduled for surgery this coming Friday. History of Present Illness       Jose Fields a 68-year-old female presents with a 1 day history of burning and pain with urination. Patient reports she has a history of kidney stones on the left and is scheduled for a procedure in 5 days. She denies any fever, abdominal or back pain since the onset of her most recent symptoms. Patient reports no visible blood in her urine. Review of Systems   Review of Systems   Constitutional: Negative. Gastrointestinal: Negative. Genitourinary: Positive for dysuria. Negative for hematuria.          Current Medications       Current Outpatient Medications:   •  ALPRAZolam (XANAX) 1 mg tablet, Take 1 tablet (1 mg total) by mouth daily as needed for anxiety, Disp: 30 tablet, Rfl: 0  •  Azelaic Acid (Finacea) 15 % cream, Apply 1 Application topically 2 (two) times a day, Disp: 30 g, Rfl: 1  •  ciprofloxacin (CIPRO) 500 mg tablet, Take 1 tablet (500 mg total) by mouth every 12 (twelve) hours for 7 days, Disp: 14 tablet, Rfl: 0  •  Magnesium 250 MG TABS, Take by mouth daily at bedtime, Disp: , Rfl:   •  Probiotic Product (PROBIOTIC-10 PO), Take by mouth in the morning, Disp: , Rfl:   •  tamsulosin (FLOMAX) 0.4 mg, Take 1 capsule (0.4 mg total) by mouth daily with dinner, Disp: 30 capsule, Rfl: 0  •  oxyCODONE-acetaminophen (PERCOCET) 5-325 mg per tablet, Take 1 tablet by mouth every 6 (six) hours as needed (Patient not taking: Reported on 10/8/2023), Disp: , Rfl:     Current Allergies     Allergies as of 10/08/2023   • (No Known Allergies)            The following portions of the patient's history were reviewed and updated as appropriate: allergies, current medications, past family history, past medical history, past social history, past surgical history and problem list.     Past Medical History:   Diagnosis Date   • Anxiety    • GERD (gastroesophageal reflux disease)     occ   • Hemorrhoids    • Kidney stone 7/18/23   • Migraine        Past Surgical History:   Procedure Laterality Date   • APPENDECTOMY  2008   • DILATION AND CURETTAGE, DIAGNOSTIC / THERAPEUTIC     • UPPER GASTROINTESTINAL ENDOSCOPY     • US GUIDED BREAST BIOPSY LEFT COMPLETE Left 11/22/2021   • VARICOSE VEIN SURGERY Left 2012       Family History   Problem Relation Age of Onset   • No Known Problems Mother    • No Known Problems Father    • Uterine cancer Paternal Grandmother    • No Known Problems Sister    • No Known Problems Maternal Grandmother    • No Known Problems Maternal Grandfather    • No Known Problems Paternal Grandfather    • No Known Problems Son    • No Known Problems Son    • Lymphoma Maternal Aunt 46   • Breast cancer Neg Hx          Medications have been verified. Objective   /81   Pulse 78   Temp 97.9 °F (36.6 °C)   Resp 18   Ht 5' 2" (1.575 m)   Wt 93.9 kg (207 lb)   SpO2 97%   BMI 37.86 kg/m²   No LMP recorded. Physical Exam     Physical Exam  Constitutional:       General: She is not in acute distress. Appearance: Normal appearance. She is not ill-appearing. Cardiovascular:      Rate and Rhythm: Normal rate and regular rhythm. Heart sounds: Normal heart sounds. No murmur heard.   Pulmonary:      Effort: Pulmonary effort is normal.      Breath sounds: Normal breath sounds. Abdominal:      General: Abdomen is protuberant. Bowel sounds are normal.      Palpations: Abdomen is soft. Tenderness: There is abdominal tenderness in the suprapubic area. There is left CVA tenderness. Neurological:      Mental Status: She is alert.

## 2023-10-09 ENCOUNTER — NURSE TRIAGE (OUTPATIENT)
Age: 48
End: 2023-10-09

## 2023-10-09 NOTE — TELEPHONE ENCOUNTER
Please call patient with urine results from over the weekend. She is having ongoing pelvic pain and pressure. She went to urgent care over the weekend because she felt she had at UTI. Her surgery if Friday. Please review and advise. She doesn't want surgery canceled.

## 2023-10-10 LAB — BACTERIA UR CULT: NORMAL

## 2023-10-12 ENCOUNTER — HOSPITAL ENCOUNTER (OUTPATIENT)
Dept: MRI IMAGING | Facility: HOSPITAL | Age: 48
End: 2023-10-12
Payer: COMMERCIAL

## 2023-10-12 DIAGNOSIS — K76.9 LIVER LESION: ICD-10-CM

## 2023-10-12 DIAGNOSIS — D18.03 HEPATIC HEMANGIOMA: ICD-10-CM

## 2023-10-12 PROCEDURE — 74183 MRI ABD W/O CNTR FLWD CNTR: CPT

## 2023-10-12 PROCEDURE — A9585 GADOBUTROL INJECTION: HCPCS | Performed by: NURSE PRACTITIONER

## 2023-10-12 PROCEDURE — G1004 CDSM NDSC: HCPCS

## 2023-10-12 RX ORDER — GADOBUTROL 604.72 MG/ML
9 INJECTION INTRAVENOUS
Status: COMPLETED | OUTPATIENT
Start: 2023-10-12 | End: 2023-10-12

## 2023-10-12 RX ADMIN — GADOBUTROL 9 ML: 604.72 INJECTION INTRAVENOUS at 11:35

## 2023-10-13 ENCOUNTER — APPOINTMENT (OUTPATIENT)
Dept: RADIOLOGY | Facility: HOSPITAL | Age: 48
End: 2023-10-13
Payer: COMMERCIAL

## 2023-10-13 ENCOUNTER — ANESTHESIA (OUTPATIENT)
Dept: PERIOP | Facility: HOSPITAL | Age: 48
End: 2023-10-13
Payer: COMMERCIAL

## 2023-10-13 ENCOUNTER — HOSPITAL ENCOUNTER (OUTPATIENT)
Facility: HOSPITAL | Age: 48
Setting detail: OUTPATIENT SURGERY
Discharge: HOME | End: 2023-10-13
Attending: UROLOGY | Admitting: UROLOGY
Payer: COMMERCIAL

## 2023-10-13 ENCOUNTER — ANESTHESIA EVENT (OUTPATIENT)
Dept: PERIOP | Facility: HOSPITAL | Age: 48
End: 2023-10-13
Payer: COMMERCIAL

## 2023-10-13 VITALS
OXYGEN SATURATION: 98 % | HEART RATE: 78 BPM | SYSTOLIC BLOOD PRESSURE: 106 MMHG | DIASTOLIC BLOOD PRESSURE: 69 MMHG | TEMPERATURE: 97.2 F | RESPIRATION RATE: 18 BRPM

## 2023-10-13 DIAGNOSIS — N20.1 URETERAL STONE: Primary | ICD-10-CM

## 2023-10-13 LAB
EXT PREGNANCY TEST URINE: NEGATIVE
EXT. CONTROL: NORMAL

## 2023-10-13 PROCEDURE — NC001 PR NO CHARGE: Performed by: UROLOGY

## 2023-10-13 PROCEDURE — C1769 GUIDE WIRE: HCPCS | Performed by: UROLOGY

## 2023-10-13 PROCEDURE — C2617 STENT, NON-COR, TEM W/O DEL: HCPCS | Performed by: UROLOGY

## 2023-10-13 PROCEDURE — 81025 URINE PREGNANCY TEST: CPT | Performed by: UROLOGY

## 2023-10-13 PROCEDURE — 52356 CYSTO/URETERO W/LITHOTRIPSY: CPT | Performed by: UROLOGY

## 2023-10-13 PROCEDURE — C1894 INTRO/SHEATH, NON-LASER: HCPCS | Performed by: UROLOGY

## 2023-10-13 PROCEDURE — 74420 UROGRAPHY RTRGR +-KUB: CPT

## 2023-10-13 DEVICE — INJECTION STENT SET
Type: IMPLANTABLE DEVICE | Site: URETER | Status: FUNCTIONAL
Brand: CONTOUR™ INJECTION STENT SET

## 2023-10-13 RX ORDER — ONDANSETRON 2 MG/ML
INJECTION INTRAMUSCULAR; INTRAVENOUS AS NEEDED
Status: DISCONTINUED | OUTPATIENT
Start: 2023-10-13 | End: 2023-10-13

## 2023-10-13 RX ORDER — DEXAMETHASONE SODIUM PHOSPHATE 10 MG/ML
INJECTION, SOLUTION INTRAMUSCULAR; INTRAVENOUS AS NEEDED
Status: DISCONTINUED | OUTPATIENT
Start: 2023-10-13 | End: 2023-10-13

## 2023-10-13 RX ORDER — SODIUM CHLORIDE 9 MG/ML
INJECTION, SOLUTION INTRAVENOUS AS NEEDED
Status: DISCONTINUED | OUTPATIENT
Start: 2023-10-13 | End: 2023-10-13 | Stop reason: HOSPADM

## 2023-10-13 RX ORDER — HYDROMORPHONE HCL/PF 1 MG/ML
0.5 SYRINGE (ML) INJECTION
Status: DISCONTINUED | OUTPATIENT
Start: 2023-10-13 | End: 2023-10-13 | Stop reason: HOSPADM

## 2023-10-13 RX ORDER — OXYCODONE HYDROCHLORIDE AND ACETAMINOPHEN 5; 325 MG/1; MG/1
1 TABLET ORAL EVERY 4 HOURS PRN
Status: DISCONTINUED | OUTPATIENT
Start: 2023-10-13 | End: 2023-10-13 | Stop reason: HOSPADM

## 2023-10-13 RX ORDER — FENTANYL CITRATE/PF 50 MCG/ML
25 SYRINGE (ML) INJECTION
Status: DISCONTINUED | OUTPATIENT
Start: 2023-10-13 | End: 2023-10-13 | Stop reason: HOSPADM

## 2023-10-13 RX ORDER — MAGNESIUM HYDROXIDE 1200 MG/15ML
LIQUID ORAL AS NEEDED
Status: DISCONTINUED | OUTPATIENT
Start: 2023-10-13 | End: 2023-10-13 | Stop reason: HOSPADM

## 2023-10-13 RX ORDER — EPHEDRINE SULFATE 50 MG/ML
INJECTION INTRAVENOUS AS NEEDED
Status: DISCONTINUED | OUTPATIENT
Start: 2023-10-13 | End: 2023-10-13

## 2023-10-13 RX ORDER — ONDANSETRON 2 MG/ML
4 INJECTION INTRAMUSCULAR; INTRAVENOUS ONCE AS NEEDED
Status: DISCONTINUED | OUTPATIENT
Start: 2023-10-13 | End: 2023-10-13 | Stop reason: HOSPADM

## 2023-10-13 RX ORDER — SODIUM CHLORIDE, SODIUM LACTATE, POTASSIUM CHLORIDE, CALCIUM CHLORIDE 600; 310; 30; 20 MG/100ML; MG/100ML; MG/100ML; MG/100ML
125 INJECTION, SOLUTION INTRAVENOUS CONTINUOUS
Status: DISCONTINUED | OUTPATIENT
Start: 2023-10-13 | End: 2023-10-13 | Stop reason: HOSPADM

## 2023-10-13 RX ORDER — CEFAZOLIN SODIUM 2 G/50ML
2000 SOLUTION INTRAVENOUS ONCE
Status: COMPLETED | OUTPATIENT
Start: 2023-10-13 | End: 2023-10-13

## 2023-10-13 RX ORDER — FENTANYL CITRATE 50 UG/ML
INJECTION, SOLUTION INTRAMUSCULAR; INTRAVENOUS AS NEEDED
Status: DISCONTINUED | OUTPATIENT
Start: 2023-10-13 | End: 2023-10-13

## 2023-10-13 RX ORDER — PROPOFOL 10 MG/ML
INJECTION, EMULSION INTRAVENOUS AS NEEDED
Status: DISCONTINUED | OUTPATIENT
Start: 2023-10-13 | End: 2023-10-13

## 2023-10-13 RX ORDER — MIDAZOLAM HYDROCHLORIDE 2 MG/2ML
INJECTION, SOLUTION INTRAMUSCULAR; INTRAVENOUS AS NEEDED
Status: DISCONTINUED | OUTPATIENT
Start: 2023-10-13 | End: 2023-10-13

## 2023-10-13 RX ORDER — LIDOCAINE HYDROCHLORIDE 10 MG/ML
INJECTION, SOLUTION EPIDURAL; INFILTRATION; INTRACAUDAL; PERINEURAL AS NEEDED
Status: DISCONTINUED | OUTPATIENT
Start: 2023-10-13 | End: 2023-10-13

## 2023-10-13 RX ORDER — HEPARIN SODIUM 5000 [USP'U]/ML
5000 INJECTION, SOLUTION INTRAVENOUS; SUBCUTANEOUS ONCE
Status: COMPLETED | OUTPATIENT
Start: 2023-10-13 | End: 2023-10-13

## 2023-10-13 RX ORDER — OXYCODONE HYDROCHLORIDE AND ACETAMINOPHEN 5; 325 MG/1; MG/1
1 TABLET ORAL EVERY 8 HOURS PRN
Qty: 12 TABLET | Refills: 0 | Status: SHIPPED | OUTPATIENT
Start: 2023-10-13 | End: 2023-10-23

## 2023-10-13 RX ORDER — CIPROFLOXACIN 500 MG/1
500 TABLET, FILM COATED ORAL DAILY
Qty: 7 TABLET | Refills: 0 | Status: SHIPPED | OUTPATIENT
Start: 2023-10-13 | End: 2023-10-20

## 2023-10-13 RX ADMIN — ONDANSETRON 4 MG: 2 INJECTION INTRAMUSCULAR; INTRAVENOUS at 09:35

## 2023-10-13 RX ADMIN — HEPARIN SODIUM 5000 UNITS: 5000 INJECTION INTRAVENOUS; SUBCUTANEOUS at 07:52

## 2023-10-13 RX ADMIN — FENTANYL CITRATE 25 MCG: 50 INJECTION, SOLUTION INTRAMUSCULAR; INTRAVENOUS at 09:25

## 2023-10-13 RX ADMIN — EPHEDRINE SULFATE 5 MG: 50 INJECTION INTRAVENOUS at 09:18

## 2023-10-13 RX ADMIN — LIDOCAINE HYDROCHLORIDE 50 MG: 10 INJECTION, SOLUTION EPIDURAL; INFILTRATION; INTRACAUDAL; PERINEURAL at 09:00

## 2023-10-13 RX ADMIN — DEXAMETHASONE SODIUM PHOSPHATE 10 MG: 10 INJECTION, SOLUTION INTRAMUSCULAR; INTRAVENOUS at 09:06

## 2023-10-13 RX ADMIN — PROPOFOL 200 MG: 10 INJECTION, EMULSION INTRAVENOUS at 09:00

## 2023-10-13 RX ADMIN — FENTANYL CITRATE 25 MCG: 50 INJECTION, SOLUTION INTRAMUSCULAR; INTRAVENOUS at 09:09

## 2023-10-13 RX ADMIN — EPHEDRINE SULFATE 5 MG: 50 INJECTION INTRAVENOUS at 09:15

## 2023-10-13 RX ADMIN — SODIUM CHLORIDE, SODIUM LACTATE, POTASSIUM CHLORIDE, AND CALCIUM CHLORIDE: .6; .31; .03; .02 INJECTION, SOLUTION INTRAVENOUS at 08:59

## 2023-10-13 RX ADMIN — FENTANYL CITRATE 25 MCG: 50 INJECTION, SOLUTION INTRAMUSCULAR; INTRAVENOUS at 09:00

## 2023-10-13 RX ADMIN — CEFAZOLIN SODIUM 2000 MG: 2 SOLUTION INTRAVENOUS at 08:56

## 2023-10-13 RX ADMIN — MIDAZOLAM 2 MG: 1 INJECTION INTRAMUSCULAR; INTRAVENOUS at 08:56

## 2023-10-13 RX ADMIN — GENTAMICIN SULFATE 185 MG: 40 INJECTION, SOLUTION INTRAMUSCULAR; INTRAVENOUS at 08:59

## 2023-10-13 NOTE — INTERVAL H&P NOTE
H&P reviewed. After examining the patient I find no changes in the patients condition since the H&P had been written.     Vitals:    10/13/23 0753   BP: 109/74   Pulse: 81   Resp: 18   Temp: 98.1 °F (36.7 °C)   SpO2: 97%

## 2023-10-13 NOTE — ANESTHESIA PREPROCEDURE EVALUATION
Procedure:  CYSTOSCOPY URETEROSCOPY WITH LITHOTRIPSY HOLMIUM LASER, RETROGRADE PYELOGRAM AND INSERTION STENT URETERAL (Left: Ureter)    Relevant Problems   CARDIO   (+) Migraine with aura and without status migrainosus, not intractable   (+) Other hyperlipidemia      GI/HEPATIC   (+) GERD (gastroesophageal reflux disease)   (+) Other dysphagia      NEURO/PSYCH   (+) Anxiety   (+) Generalized anxiety disorder   (+) Major depression, recurrent (HCC)   (+) Migraine with aura and without status migrainosus, not intractable   (+) New daily persistent headache   (+) Pressure in head      PULMONARY   (+) Other sleep apnea        Physical Exam    Airway    Mallampati score: II  TM Distance: <3 FB  Neck ROM: full     Dental   No notable dental hx     Cardiovascular  Cardiovascular exam normal    Pulmonary  Pulmonary exam normal     Other Findings      Anesthesia Plan  ASA Score- 2     Anesthesia Type- general with ASA Monitors. Additional Monitors:     Airway Plan: ETT and LMA. Plan Factors-Exercise tolerance (METS): >4 METS. Chart reviewed. Existing labs reviewed. Patient is not a current smoker. Patient not instructed to abstain from smoking on day of procedure. Patient did not smoke on day of surgery. Induction- intravenous. Postoperative Plan-     Informed Consent- Anesthetic plan and risks discussed with patient. I personally reviewed this patient with the CRNA. Discussed and agreed on the Anesthesia Plan with the CRNA. Kendrick Lennox

## 2023-10-13 NOTE — OP NOTE
OPERATIVE REPORT  PATIENT NAME: Willian Lara    :  1975  MRN: [id number]  Pt Location:  OR ROOM 10    SURGERY DATE: 10/13/2023    Surgeon(s) and Role:     * Mechelle Stevens MD - Primary    Preop Diagnosis:  Ureterolithiasis [N20.1]-left  Hydronephrosis with urinary obstruction due to ureteral calculus [N13.2]-left    Post-Op Diagnosis Codes:     * Ureterolithiasis [N20.1]     * Hydronephrosis with urinary obstruction due to ureteral calculus [N13.2]-left    Procedure(s):  Left - CYSTOSCOPY URETEROSCOPY WITH LITHOTRIPSY HOLMIUM LASER. RETROGRADE PYELOGRAM AND INSERTION STENT URETERAL    Specimen(s):  * No specimens in log *    Estimated Blood Loss:   Minimal    Drains:  Ureteral Internal Stent Left ureter 6 Fr. (Active)   Number of days: 0       Anesthesia Type:   General    Operative Indications:  Ureterolithiasis [N20.1]  Hydronephrosis with urinary obstruction due to ureteral calculus [N13.2]  -Left    Operative Findings:  See operative note below    Complications:   None    Procedure and Technique: I saw the patient in the holding area and explained the proposed procedure step-by-step answered all questions performed history and physical and discussed potential risks and complications of bleeding infection retained stone or stone fragments need for ureteral stent stricture contracture perforation need for Zavala catheter. Patient expressed understanding and all questions were answered. The patient provided verbal and previously signed informed consent. The patient was taken the operating room identified by the surgeon placed on the operating table in the dorsolithotomy position after general LMA anesthesia was induced and appropriate timeout took place. After prepping and draping in the usual manner a 25 Argentine cystoscope was placed per urethra into the urinary bladder with 30 and 70 degree telescopes examining the urethra and bladder. No abnormalities were identified.   There were no intrinsic lesions of the bladder or urethra no urethral strictures and no evidence of extrinsic mass compression of the urinary tract. Both ureteral orifices were normal position and configuration bilaterally with clear E flux only seen on the right. A 0.035 Blu-coated floppy tip guidewire was placed retrograde up the left ureter and manipulated proximally into the renal pelvis. The cystoscope was removed and a short semirigid Clarence Gallop ureteroscope was inserted per urethra into the urinary bladder and retrograde up the left ureter. A large stone yellow gold in color and smooth was identified in the distal ureter. Using the 365 µm holmium laser fiber this stone was fragmented/dusted into submillimeter particles. There was a smaller stone behind the larger stone which was also fragmented adequately. All stone fragments washed out into the urinary bladder distally. At this point the ureteroscope was used to visualize the ureter from the UVJ to the UPJ. No other significant stone material was identified no abnormalities of the ureter or trauma to the ureter identified. Ureteroscope was removed from the patient atraumatically and over the wire a 6 Telugu Contour stent was placed proximally in the renal pelvis and distally in the urinary bladder under fluoroscopic control. Contrast injection confirmed good positioning of the stent without extravasation. The stent was internalized and all other equipment other than the stent was removed from the urinary tract. A transurethral Dangler suture was taped to the mons pubis. The patient tolerated the procedure well without complication. She will follow-up with her urologic provider in 1 week for stent removal via Dangler suture in 3 months prior to which time stone risk profile comprehensive metabolic panel uric acid level PTH level will be performed along with follow-up renal ultrasound.     I was present and performed all urologic procedures on this patient      PACU         SIGNATURE: Frantz Henriquez MD  DATE: October 13, 2023  TIME: 9:38 AM

## 2023-10-13 NOTE — ANESTHESIA POSTPROCEDURE EVALUATION
Post-Op Assessment Note    CV Status:  Stable  Pain Score: 0    Pain management: adequate     Mental Status:  Alert and awake   Hydration Status:  Euvolemic   PONV Controlled:  Controlled   Airway Patency:  Patent      Post Op Vitals Reviewed: Yes      Staff: Anesthesiologist, CRNA         There were no known notable events for this encounter.     /62 (10/13/23 0947)    Temp (!) 97.2 °F (36.2 °C) (10/13/23 0947)    Pulse 93 (10/13/23 0947)   Resp 16 (10/13/23 0947)    SpO2 99 % (10/13/23 0947)

## 2023-10-16 ENCOUNTER — TELEPHONE (OUTPATIENT)
Dept: UROLOGY | Facility: MEDICAL CENTER | Age: 48
End: 2023-10-16

## 2023-10-16 DIAGNOSIS — N20.1 URETEROLITHIASIS: Primary | ICD-10-CM

## 2023-10-16 NOTE — TELEPHONE ENCOUNTER
Post Op Note    Ghassan Child is a 50 y.o. female s/p CYSTOSCOPY URETEROSCOPY WITH LITHOTRIPSY HOLMIUM LASER, RETROGRADE PYELOGRAM AND INSERTION STENT URETERAL (Left: Ureter) on 10/13/23 with Dr. Peyton Matias. office. How would you rate your pain on a scale from 1 to 10, 10 being the worst pain ever? 1  Have you had a fever? No    Have your bowel movements been regular? No  Do you have any difficulty urinating? No  If the patient has an incision- do you have any redness around the incision or any drainage from the incision? No incision  If the patient has a drain- are you having any issues with the drain? No drain   If the patient has a mchugh- are you comfortable caring for your mchugh? No mchugh  Do you have any other questions or concerns that I can address at this time     Pt states she is feeling "OK" with minimal discomfort for which she is taking Tylenol and using a heat pad. She is feeling constipated and was advised to increase hydration and start stool softeners and Miralax. She does not feel comfortable removing her stent and is scheduled on 10/20/23 in [facility] office with the nurse. She will fu in 3 months with renal US; stone risk profile and PTH, Uric Acid and CMP labs. ER precautions were reviewed with pt.

## 2023-10-20 ENCOUNTER — PROCEDURE VISIT (OUTPATIENT)
Dept: UROLOGY | Facility: MEDICAL CENTER | Age: 48
End: 2023-10-20

## 2023-10-20 VITALS
DIASTOLIC BLOOD PRESSURE: 70 MMHG | BODY MASS INDEX: 36.99 KG/M2 | HEART RATE: 89 BPM | SYSTOLIC BLOOD PRESSURE: 90 MMHG | HEIGHT: 62 IN | WEIGHT: 201 LBS

## 2023-10-20 DIAGNOSIS — N20.1 URETEROLITHIASIS: Primary | ICD-10-CM

## 2023-10-20 PROCEDURE — 99024 POSTOP FOLLOW-UP VISIT: CPT

## 2023-10-20 NOTE — PROGRESS NOTES
10/20/2023  Terrie Luz is a 50 y.o. female  [id number]        Diagnosis  Chief Complaint    Ureterolithiasis         Patient is s/p CYSTOSCOPY URETEROSCOPY WITH LITHOTRIPSY HOLMIUM LASER, RETROGRADE PYELOGRAM AND INSERTION STENT URETERAL (Left: Ureter)  on 10/13/23 with Dr. Kavita Yan. Plan  FU in 3 months with renal US; stone risk profile and  Uric Acid, CMP and Uric Acid labs prior      Procedure Stent with String Removal    Vitals:    10/20/23 1138   BP: 90/70   Pulse: 89   Weight: 91.2 kg (201 lb)   Height: 5' 2" (1.575 m)       Stent with string removed intact without difficulty. Reviewed post stent removal symptoms including flank pain, dysuria, and hematuria. Instructed patient to increase oral fluid intake. Encouraged the use of NSAIDS and other prescribed pain medication as needed for discomfort. Patient instructed to call the office or report to the ER for uncontrolled pain, fever, chills, nausea or vomiting.        Meeta Flores RN

## 2023-11-01 ENCOUNTER — TELEPHONE (OUTPATIENT)
Dept: FAMILY MEDICINE CLINIC | Facility: CLINIC | Age: 48
End: 2023-11-01

## 2023-11-01 NOTE — TELEPHONE ENCOUNTER
----- Message from [address] sent at 10/31/2023  1:28 PM EDT -----  MRI showed hemangioma. No suspicious liver lesions noted.

## 2023-11-20 ENCOUNTER — OFFICE VISIT (OUTPATIENT)
Dept: DERMATOLOGY | Facility: CLINIC | Age: 48
End: 2023-11-20
Payer: COMMERCIAL

## 2023-11-20 VITALS — TEMPERATURE: 98.6 F | BODY MASS INDEX: 37.17 KG/M2 | WEIGHT: 202 LBS | HEIGHT: 62 IN

## 2023-11-20 DIAGNOSIS — L71.9 ROSACEA: ICD-10-CM

## 2023-11-20 DIAGNOSIS — L71.0 PERIORIFICIAL DERMATITIS: Primary | ICD-10-CM

## 2023-11-20 PROCEDURE — 99214 OFFICE O/P EST MOD 30 MIN: CPT | Performed by: STUDENT IN AN ORGANIZED HEALTH CARE EDUCATION/TRAINING PROGRAM

## 2023-11-20 RX ORDER — METRONIDAZOLE 10 MG/G
GEL TOPICAL DAILY
Qty: 30 G | Refills: 3 | Status: SHIPPED | OUTPATIENT
Start: 2023-11-20

## 2023-11-20 NOTE — PROGRESS NOTES
[facility] Note     Patient Name: Navneet Mancuso  Encounter Date: 11/20/23     Have you been cared for by a [facility] D[facility]atologist in the last 3 years and, if so, which description applies to you? Yes. I have been here within the last 3 years, and my medical history has NOT changed since that time. I am FEMALE/of child-bearing potential.    REVIEW OF SYSTEMS:  Have you recently had or currently have any of the following? No changes in my recent health. PAST MEDICAL HISTORY:  Have you personally ever had or currently have any of the following? If "YES," then please provide more detail. No changes in my medical history. HISTORY OF IMMUNOSUPPRESSION: Do you have a history of any of the following:  Systemic Immunosuppression such as Diabetes, Biologic or Immunotherapy, Chemotherapy, Organ Transplantation, Bone Marrow Transplantation? No     Answering "YES" requires the addition of the dotphrase "IMMUNOSUPPRESSED" as the first diagnosis of the patient's visit. FAMILY HISTORY:  Any "first degree relatives" (parent, brother, sister, or child) with the following? No changes in my family's known health. PATIENT EXPERIENCE:    Do you want the Dermatologist to perform a COMPLETE skin exam today including a clinical examination under the "bra and underwear" areas? NO  If necessary, do we have your permission to call and leave a detailed message on your Preferred Phone number that includes your specific medical information?   Yes      No Known Allergies   Current Outpatient Medications:     ALPRAZolam (XANAX) 1 mg tablet, Take 1 tablet (1 mg total) by mouth daily as needed for anxiety, Disp: 30 tablet, Rfl: 0    Azelaic Acid (Finacea) 15 % cream, Apply 1 Application topically 2 (two) times a day, Disp: 30 g, Rfl: 1    Magnesium 250 MG TABS, Take by mouth daily at bedtime, Disp: , Rfl:     Probiotic Product (PROBIOTIC-10 PO), Take by mouth in the morning, Disp: , Rfl:           Whom besides the patient is providing clinical information about today's encounter? NO ADDITIONAL HISTORIAN (patient alone provided history)    Physical Exam and Assessment/Plan by Diagnosis:    PERIORIFICIAL DERMATITIS  ROSACEA    Physical Exam:  Anatomic Location Affected:  perioral (right side of face and chin), malar face  Morphological Description:  Monomorphic red papules in photos, skin adjacent to vermilion border spared. Malar face with background erythema and telangiectasias    Additional History of Present Condition:    Using topical steroids in area of involvement?: Previously given topical steroids.  Not using currently  Using flourinated toothpastes: N    - Notes improvement after initiating finacea but still present.   - Has had numerous outbreaks in past.      Assessment and Plan:  - Educated that periorificial dermatitis is an inflammatory disorder of unclear etiology that generally presents as pink papules in a periorificial distribution  - Educated that periorificial dermatitis is generally seen in women with certain factors implicated, including topical steroids, steroid inhalers, fluorinated toothpastes and oral contraceptives/pregnancy  - Educated that when associated with steroid use, removal of the steroid is important, but that flaring may occur  - Discussed treatment options including topical metronidazole, topical tacrolimus, antibiotics  Based on a thorough discussion of this condition and the management approach to it (including a comprehensive discussion of the known risks, side effects and potential benefits of treatment), the patient (family) agrees to implement the following specific plan:       Start Metronidazole/Ivermectin/azelaic acid tripe cream apply daily sent to [address] Can use on entire face for both periorificial dermatitis and rosacea   - Follow up four to six weeks to discuss hyperpigmentation and response to cream   - Discussed using moisturizing cream such as Vanicream           Scribe Attestation      I,:  Wright Nottingham, MA am acting as a scribe while in the presence of the attending physician.:       I,:  Yesenia Giordano MD personally performed the services described in this documentation    as scribed in my presence.:

## 2023-11-20 NOTE — PATIENT INSTRUCTIONS
Assessment and Plan:  - Educated that periorificial dermatitis is an inflammatory disorder of unclear etiology that generally presents as pink papules in a periorificial distribution  - Educated that periorificial dermatitis is generally seen in women with certain factors implicated, including topical steroids, steroid inhalers, fluorinated toothpastes and oral contraceptives/pregnancy  - Educated that when associated with steroid use, removal of the steroid is important, but that flaring may occur  - Discussed treatment options including topical metronidazole, topical tacrolimus, antibiotics  Based on a thorough discussion of this condition and the management approach to it (including a comprehensive discussion of the known risks, side effects and potential benefits of treatment), the patient (family) agrees to implement the following specific plan:       Start Metronidazole 1% gel apply daily sent to [address]  Can use on entire face   Follow up four to six weeks to discuss hypopigmentation  Discussed using moisturizing cream such as Vanicream

## 2023-11-22 ENCOUNTER — ANNUAL EXAM (OUTPATIENT)
Dept: GYNECOLOGY | Facility: CLINIC | Age: 48
End: 2023-11-22
Payer: COMMERCIAL

## 2023-11-22 VITALS
DIASTOLIC BLOOD PRESSURE: 78 MMHG | WEIGHT: 206 LBS | SYSTOLIC BLOOD PRESSURE: 122 MMHG | HEIGHT: 62 IN | BODY MASS INDEX: 37.91 KG/M2

## 2023-11-22 DIAGNOSIS — Z11.51 SCREENING FOR HPV (HUMAN PAPILLOMAVIRUS): ICD-10-CM

## 2023-11-22 DIAGNOSIS — Z01.419 ROUTINE GYNECOLOGICAL EXAMINATION: Primary | ICD-10-CM

## 2023-11-22 PROCEDURE — 99396 PREV VISIT EST AGE 40-64: CPT | Performed by: OBSTETRICS & GYNECOLOGY

## 2023-11-22 NOTE — PROGRESS NOTES
Assessment/Plan:    Normal breast and GYN exam  Regular menstrual cycles  Normal Pap smear negative HPV 2019  Benign breast biopsy 2021  Colonoscopy with polyps 2021  History of kidney stones. Status post cystoscopy and removal calculi 2023      Plan: Check Pap smear. Rx mammogram.  Continue healthy diet exercise. Subjective:       Patient ID: Willian Lara is a 50 y.o. female presents to the office for annual exam with no complaints. Regular menstrual cycles. Denies any pelvic pain or dyspareunia. Denies any breast bowel or bladder issues. Recently had a renal calculi removed via cystoscopy. Doing much better. States it was calcium oxalate stone. Had been drinking mushroom tea for a while. Has since stopped. No change in family history. Medications reviewed. Review of Systems   Constitutional: Negative. Negative for fatigue, fever and unexpected weight change. HENT: Negative. Eyes: Negative. Respiratory: Negative. Negative for chest tightness, shortness of breath, wheezing and stridor. Cardiovascular: Negative. Negative for chest pain, palpitations and leg swelling. Gastrointestinal: Negative. Negative for abdominal pain, blood in stool, diarrhea, nausea, rectal pain and vomiting. Endocrine: Negative. Genitourinary:  Negative for dysuria, frequency, vaginal bleeding, vaginal discharge and vaginal pain. Musculoskeletal: Negative. Skin: Negative. Allergic/Immunologic: Negative. Neurological: Negative. Hematological: Negative. Psychiatric/Behavioral: Negative. All other systems reviewed and are negative. Objective:      /78   Ht 5' 2" (1.575 m)   Wt 93.4 kg (206 lb)   LMP 10/30/2023 (Exact Date)   BMI 37.68 kg/m²          Physical Exam  Constitutional:       Appearance: She is well-developed. HENT:      Head: Normocephalic and atraumatic. Neck:      Thyroid: No thyromegaly.       Trachea: No tracheal deviation. Cardiovascular:      Rate and Rhythm: Normal rate and regular rhythm. Heart sounds: Normal heart sounds. Pulmonary:      Effort: Pulmonary effort is normal. No respiratory distress. Breath sounds: Normal breath sounds. No stridor. No wheezing or rales. Chest:      Chest wall: No tenderness. Breasts:     Breasts are symmetrical.      Right: No inverted nipple, mass, nipple discharge, skin change or tenderness. Left: No inverted nipple, mass, nipple discharge, skin change or tenderness. Abdominal:      General: Bowel sounds are normal. There is no distension. Palpations: Abdomen is soft. There is no mass. Tenderness: There is no abdominal tenderness. There is no guarding or rebound. Hernia: No hernia is present. There is no hernia in the left inguinal area. Genitourinary:     Labia:         Right: No rash, tenderness, lesion or injury. Left: No rash, tenderness, lesion or injury. Vagina: Normal. No signs of injury and foreign body. No vaginal discharge, erythema, tenderness or bleeding. Cervix: No cervical motion tenderness, discharge or friability. Uterus: Not deviated, not enlarged, not fixed and not tender. Adnexa:         Right: No mass, tenderness or fullness. Left: No mass, tenderness or fullness. Rectum: No mass, anal fissure, external hemorrhoid or internal hemorrhoid. Musculoskeletal:      Cervical back: Normal range of motion and neck supple. Lymphadenopathy:      Lower Body: No right inguinal adenopathy. No left inguinal adenopathy. Skin:     General: Skin is warm and dry. Neurological:      Mental Status: She is alert and oriented to person, place, and time. Psychiatric:         Behavior: Behavior normal.         Thought Content:  Thought content normal.         Judgment: Judgment normal.

## 2023-11-28 LAB
HPV HR 12 DNA CVX QL NAA+PROBE: NOT DETECTED
HPV16 DNA SPEC QL NAA+PROBE: NOT DETECTED
HPV18 DNA SPEC QL NAA+PROBE: NOT DETECTED
THIN PREP CVX: NORMAL

## 2024-01-16 ENCOUNTER — TELEMEDICINE (OUTPATIENT)
Dept: DERMATOLOGY | Facility: CLINIC | Age: 49
End: 2024-01-16
Payer: COMMERCIAL

## 2024-01-16 DIAGNOSIS — L71.0 PERIORIFICIAL DERMATITIS: Primary | ICD-10-CM

## 2024-01-16 PROCEDURE — 99214 OFFICE O/P EST MOD 30 MIN: CPT | Performed by: STUDENT IN AN ORGANIZED HEALTH CARE EDUCATION/TRAINING PROGRAM

## 2024-01-16 RX ORDER — DOXYCYCLINE HYCLATE 20 MG
TABLET ORAL
Qty: 180 TABLET | Refills: 0 | Status: SHIPPED | OUTPATIENT
Start: 2024-01-16 | End: 2024-04-15

## 2024-01-16 NOTE — PROGRESS NOTES
Virtual Regular Visit    Verification of patient location:    Patient is located at Other in the following state in which I hold an active license [place]      Assessment/Plan:    Problem List Items Addressed This Visit    None           Reason for visit is No chief complaint on file.       Encounter provider Mateo Mccauley MD    Provider located at DERMATOLOGY [address] [place] [address] [phone]      Recent Visits  No visits were found meeting these conditions.  Showing recent visits within past 7 days and meeting all other requirements  Future Appointments  No visits were found meeting these conditions.  Showing future appointments within next 150 days and meeting all other requirements       The patient was identified by name and date of birth. Riri Salas was informed that this is a telemedicine visit and that the visit is being conducted through the Epic Embedded platform. She agrees to proceed..  My office door was closed. The patient was notified the following individuals were present in the room Silvina MERCADO  She acknowledged consent and understanding of privacy and security of the video platform. The patient has agreed to participate and understands they can discontinue the visit at any time.    Patient is aware this is a billable service.     Subjective  Riri Salas is a 48 y.o. female following up on perioral derm  .      HPI     Past Medical History:   Diagnosis Date    Anxiety     GERD (gastroesophageal reflux disease)     occ    Hemorrhoids     Kidney stone 7/18/23    Migraine        Past Surgical History:   Procedure Laterality Date    APPENDECTOMY  2008    DILATION AND CURETTAGE, DIAGNOSTIC / THERAPEUTIC      NM CYSTO/URETERO W/LITHOTRIPSY &INDWELL STENT INSRT Left 10/13/2023    Procedure: CYSTOSCOPY URETEROSCOPY WITH LITHOTRIPSY HOLMIUM LASER, RETROGRADE PYELOGRAM AND INSERTION STENT URETERAL;  Surgeon: Nadeem  MD Cristal;  Location:  MAIN OR;  Service: Urology    UPPER GASTROINTESTINAL ENDOSCOPY      US GUIDED BREAST BIOPSY LEFT COMPLETE Left 11/22/2021    VARICOSE VEIN SURGERY Left 2012       Current Outpatient Medications   Medication Sig Dispense Refill    Azelaic Acid (Finacea) 15 % cream Apply 1 Application topically 2 (two) times a day 30 g 1    Magnesium 250 MG TABS Take by mouth daily at bedtime      Probiotic Product (PROBIOTIC-10 PO) Take by mouth in the morning      ALPRAZolam (XANAX) 1 mg tablet Take 1 tablet (1 mg total) by mouth daily as needed for anxiety 30 tablet 0    metroNIDAZOLE (METROGEL) 1 % gel Apply topically daily (Patient not taking: Reported on 11/22/2023) 30 g 3     No current facility-administered medications for this visit.        No Known Allergies    Review of Systems    Video Exam    There were no vitals filed for this visit.    Physical Exam     Visit Time  Total Visit Duration: 18 minutes       PERIORIFICIAL DERMATITIS    Physical Exam:  Anatomic Location Affected:  perioral (right side of face and chin), malar face  Morphological Description:  Previously with monomorphic red papules in photos, skin adjacent to vermilion border spared. Malar face with background erythema and telangiectasias. Today largely clear with persistent pink plaque on right cheek.     Additional History of Present Condition:    Using topical steroids in area of involvement?: No   Using inhaler: No   Using flourinated toothpastes: N  On oral contraceptive?: No   Pregnant?: No   Planning on pregnancy?: no    - Since using triple cream notes significant improvement with good control but area of persistene.     Assessment and Plan:  - Educated that periorificial dermatitis is an inflammatory disorder of unclear etiology that generally presents as pink papules in a periorificial distribution  - Educated that periorificial dermatitis is generally seen in women with certain factors implicated, including topical  steroids, steroid inhalers, fluorinated toothpastes and oral contraceptives/pregnancy  - Educated that when associated with steroid use, removal of the steroid is important, but that flaring may occur  - Discussed treatment options including topical metronidazole, topical tacrolimus, antibiotics  Based on a thorough discussion of this condition and the management approach to it (including a comprehensive discussion of the known risks, side effects and potential benefits of treatment), the patient (family) agrees to implement the following specific plan:  Start Doxycycline 20 mg BID with plan to slowly taper. Educated on side effects, including GI distress, sun sensitivity, esophagitis and measures to minimize these. Patient denies history of antibiotic allergy.   Continue compounded Metronidazole, Ivermectin, Azelaic acid triple cream twice a day to face until improved   Follow up in 1 months       Note; patient also with concern for hyperpigmentation on malar cheeks iso perimenopause that is likely consistent with melasma. Recommended use of sunscreen with mineral blocker and iron oxides with plan to further address once POD better controlled during in person visit.       Scribe Attestation      I,:  Silvina Sr am acting as a scribe while in the presence of the attending physician.:       I,:  Mateo Mccauley MD personally performed the services described in this documentation    as scribed in my presence.:

## 2024-01-16 NOTE — PATIENT INSTRUCTIONS
Assessment and Plan:  - Educated that periorificial dermatitis is an inflammatory disorder of unclear etiology that generally presents as pink papules in a periorificial distribution  - Educated that periorificial dermatitis is generally seen in women with certain factors implicated, including topical steroids, steroid inhalers, fluorinated toothpastes and oral contraceptives/pregnancy  - Educated that when associated with steroid use, removal of the steroid is important, but that flaring may occur  - Discussed treatment options including topical metronidazole, topical tacrolimus, antibiotics  Based on a thorough discussion of this condition and the management approach to it (including a comprehensive discussion of the known risks, side effects and potential benefits of treatment), the patient (family) agrees to implement the following specific plan:  Start Doxycycline 20 mg BID with plan to slowly taper. Educated on side effects, including GI distress, sun sensitivity, esophagitis and measures to minimize these. Patient denies history of antibiotic allergy.   Continue compounded Metronidazole, Ivermectin, Azelaic acid triple cream twice a day to face until improved   Continue Sunscreen with mineral blocker with Zinc Titanium   Follow up in 1  month

## 2024-02-21 PROBLEM — N39.0 URINARY TRACT INFECTION WITHOUT HEMATURIA: Status: RESOLVED | Noted: 2018-09-19 | Resolved: 2024-02-21

## 2024-02-21 PROBLEM — Z00.00 HEALTHCARE MAINTENANCE: Status: RESOLVED | Noted: 2018-12-06 | Resolved: 2024-02-21

## 2024-02-22 ENCOUNTER — TELEPHONE (OUTPATIENT)
Dept: UROLOGY | Facility: MEDICAL CENTER | Age: 49
End: 2024-02-22

## 2024-02-22 NOTE — TELEPHONE ENCOUNTER
Call [facility] for patient to have Labs & imaging done prior to scheduled OV on 02/27/24 with Dr. Bee. Asked patient to call back our office at [phone] if she would like to reschedule OV.

## 2024-02-26 ENCOUNTER — OFFICE VISIT (OUTPATIENT)
Dept: DERMATOLOGY | Facility: CLINIC | Age: 49
End: 2024-02-26
Payer: COMMERCIAL

## 2024-02-26 ENCOUNTER — TELEPHONE (OUTPATIENT)
Dept: UROLOGY | Facility: MEDICAL CENTER | Age: 49
End: 2024-02-26

## 2024-02-26 VITALS — TEMPERATURE: 96.9 F | WEIGHT: 213 LBS | BODY MASS INDEX: 38.96 KG/M2

## 2024-02-26 DIAGNOSIS — L71.0 PERIORIFICIAL DERMATITIS: ICD-10-CM

## 2024-02-26 DIAGNOSIS — D48.5 NEOPLASM OF UNCERTAIN BEHAVIOR OF SKIN: Primary | ICD-10-CM

## 2024-02-26 PROCEDURE — 99213 OFFICE O/P EST LOW 20 MIN: CPT | Performed by: STUDENT IN AN ORGANIZED HEALTH CARE EDUCATION/TRAINING PROGRAM

## 2024-02-26 NOTE — PATIENT INSTRUCTIONS
"PERIORIFICIAL DERMATITIS (SCARRING)     Assessment and Plan:  - Educated that periorificial dermatitis is an inflammatory disorder of unclear etiology that generally presents as pink papules in a periorificial distribution  - Educated that periorificial dermatitis is generally seen in women with certain factors implicated, including topical steroids, steroid inhalers, fluorinated toothpastes and oral contraceptives/pregnancy  - Educated that when associated with steroid use, removal of the steroid is important, but that flaring may occur  - Discussed treatment options including topical metronidazole, topical tacrolimus, antibiotics  Based on a thorough discussion of this condition and the management approach to it (including a comprehensive discussion of the known risks, side effects and potential benefits of treatment), the patient (family) agrees to implement the following specific plan:  Continue with  Doxycycline 20 mg BID  as needed . Educated on side effects, including GI distress, sun sensitivity, esophagitis and measures to minimize these. Patient denies history of antibiotic allergy.   Continue compounded Metronidazole, Ivermectin, Azelaic acid triple cream twice a day to face until improved       Discussed Laser treatments: with Dr. Ventura    Ambulatory referral to Dermatology Dr. Ventura     Note; patient also with concern for hyperpigmentation on malar cheeks iso perimenopause that is likely consistent with melasma. Recommended use of sunscreen with mineral blocker and iron oxides with plan to further address once POD better controlled during in person visit.         NEOPLASM OF UNCERTAIN BEHAVIOR OF SKIN    Assessment and Plan:  I have discussed with the patient that a sample of skin via a \"skin biopsy” would be potentially helpful to further make a specific diagnosis under the microscope.  Based on a thorough discussion of this condition and the management approach to it (including a comprehensive " discussion of the known risks, side effects and potential benefits of treatment), the patient (family) agrees to implement the following specific plan:    Procedure:  Skin Biopsy.  Ambulatory referral to Plastic Surgery; Dr. Indu Szymanski; [address] [place] [address] [phone]

## 2024-02-26 NOTE — PROGRESS NOTES
"[facility] Note     Patient Name: Riri Salas  Encounter Date: 2/26/24     Have you been cared for by a [facility] Dermatologist in the last 3 years and, if so, which description applies to you?    Yes.  I have been here within the last 3 years, and my medical history has NOT changed since that time.  I am FEMALE/of child-bearing potential.    REVIEW OF SYSTEMS:  Have you recently had or currently have any of the following? No changes in my recent health.   PAST MEDICAL HISTORY:  Have you personally ever had or currently have any of the following?  If \"YES,\" then please provide more detail. No changes in my medical history.   HISTORY OF IMMUNOSUPPRESSION: Do you have a history of any of the following:  Systemic Immunosuppression such as Diabetes, Biologic or Immunotherapy, Chemotherapy, Organ Transplantation, Bone Marrow Transplantation?  No     Answering \"YES\" requires the addition of the dotphrase \"IMMUNOSUPPRESSED\" as the first diagnosis of the patient's visit.   FAMILY HISTORY:  Any \"first degree relatives\" (parent, brother, sister, or child) with the following?    No changes in my family's known health.   PATIENT EXPERIENCE:    Do you want the Dermatologist to perform a COMPLETE skin exam today including a clinical examination under the \"bra and underwear\" areas?  NO  If necessary, do we have your permission to call and leave a detailed message on your Preferred Phone number that includes your specific medical information?  Yes      No Known Allergies   Current Outpatient Medications:     Azelaic Acid (Finacea) 15 % cream, Apply 1 Application topically 2 (two) times a day, Disp: 30 g, Rfl: 1    doxycycline (PERIOSTAT) 20 MG tablet, Take 1 tablet (20 mg) 2 times a day (total 40 mg). May take pills together or separately. Take medication at least 2 hours before laying flat. Avoid sun. Take with food., Disp: 180 tablet, Rfl: 0    Magnesium 250 MG TABS, Take by mouth daily at bedtime, Disp: " , Rfl:     Probiotic Product (PROBIOTIC-10 PO), Take by mouth in the morning, Disp: , Rfl:     ALPRAZolam (XANAX) 1 mg tablet, Take 1 tablet (1 mg total) by mouth daily as needed for anxiety, Disp: 30 tablet, Rfl: 0    metroNIDAZOLE (METROGEL) 1 % gel, Apply topically daily (Patient not taking: Reported on 11/22/2023), Disp: 30 g, Rfl: 3          Whom besides the patient is providing clinical information about today's encounter?   NO ADDITIONAL HISTORIAN (patient alone provided history)    Physical Exam and Assessment/Plan by Diagnosis:      PERIORIFICIAL DERMATITIS (SCARRING)     Physical Exam:  Anatomic Location Affected:  perioral (right side of face and chin), malar face  Morphological Description:  Previously with monomorphic red papules in photos, skin adjacent to vermilion border spared. Malar face with background erythema and telangiectasias. Today largely clear with persistent pink mildly atrophic plaque on right cheek at area of prior active lesions     Additional History of Present Condition:    Using topical steroids in area of involvement?: No   Using inhaler: No   Using flourinated toothpastes: N  On oral contraceptive?: No - perimenopausal                 Pregnant?: No   Planning on pregnancy?: no     - Since using triple cream notes significant improvement with good control and no flares; however still with redness that persists.      Assessment and Plan:  - Educated that periorificial dermatitis is an inflammatory disorder of unclear etiology that generally presents as pink papules in a periorificial distribution  - Educated that periorificial dermatitis is generally seen in women with certain factors implicated, including topical steroids, steroid inhalers, fluorinated toothpastes and oral contraceptives/pregnancy  - Educated that when associated with steroid use, removal of the steroid is important, but that flaring may occur  - Discussed treatment options including topical metronidazole, topical  "tacrolimus, antibiotics  - Discussed that findings today are consistent with scar with no evidence of active lesions  Based on a thorough discussion of this condition and the management approach to it (including a comprehensive discussion of the known risks, side effects and potential benefits of treatment), the patient (family) agrees to implement the following specific plan:  Continue with  Doxycycline 20 mg BID  as needed  for flares. Educated on side effects, including GI distress, sun sensitivity, esophagitis and measures to minimize these. Patient denies history of antibiotic allergy.   Continue compounded Metronidazole, Ivermectin, Azelaic acid triple cream twice a day to face until improved       Discussed Laser treatments: with Dr. Ventura    Ambulatory referral to Dermatology Dr. Ventura           NEOPLASM OF UNCERTAIN BEHAVIOR OF SKIN    Physical Exam:  (Anatomic Location); (Size and Morphological Description); (Differential Diagnosis):  Right bottom lip: 3 mm white papule with some adjacent telangiectasias at area previously treated with LN2  Pertinent Positives:  Pertinent Negatives:    Additional History of Present Condition:  patient notes that white spot not going away and changing color; had cryo done on 6/13/23; patient concerned not going away    Assessment and Plan:  I have discussed with the patient that a sample of skin via a \"skin biopsy” would be potentially helpful to further make a specific diagnosis under the microscope. Patient prefers this be performed with plastic surgery so we will facilitate appointment.  Based on a thorough discussion of this condition and the management approach to it (including a comprehensive discussion of the known risks, side effects and potential benefits of treatment), the patient (family) agrees to implement the following specific plan:    Procedure:  Skin Biopsy.  Ambulatory referral to Plastic Surgery; Dr. Indu Szymanski; [address] [facility], [place], [place] " [address]  [phone]  Scribe Attestation      I,:  Marie Sebastian am acting as a scribe while in the presence of the attending physician.:       I,:  Mateo Mccauley MD personally performed the services described in this documentation    as scribed in my presence.:

## 2024-02-27 ENCOUNTER — TELEPHONE (OUTPATIENT)
Dept: PLASTIC SURGERY | Facility: CLINIC | Age: 49
End: 2024-02-27

## 2024-02-27 NOTE — TELEPHONE ENCOUNTER
ROBBYM to schedule an appt from a referral we received.  Asked pt to call the office to schedule.  When pt calls back please schedule with Yvrose.

## 2024-02-28 ENCOUNTER — CONSULT (OUTPATIENT)
Dept: PLASTIC SURGERY | Facility: CLINIC | Age: 49
End: 2024-02-28
Payer: COMMERCIAL

## 2024-02-28 DIAGNOSIS — H02.403 PTOSIS OF BOTH EYELIDS: Primary | ICD-10-CM

## 2024-02-28 PROCEDURE — 99204 OFFICE O/P NEW MOD 45 MIN: CPT | Performed by: PHYSICIAN ASSISTANT

## 2024-02-29 NOTE — PROGRESS NOTES
Consultation - Plastic Surgery  Riri Salas 49 y.o. female MRN: [id number]  Unit/Bed#:  Encounter: [id number]        Consults    ASSESSMENT/PLAN:    49 year old female presenting with lesion to right lower lip    - discussed with patient multiple options for treatment of lip lesion which included monitoring, punch biopsy in office today, or surgical removal. Patient elected for surgical removal. Consent obtained. We discussed possible scar, need for additional procedures, contour deformity. I do believe the area is benign. However, we discussed in the surgical setting if margins were to come back positive, we would need to remove additional tissue. Patient understands and would like to proceed. She would like to have surgery scheduled after April 1st.   - patient also interested in possible bilateral upper blepharoplasty. Instructed patient to obtain visual field testing. Patient would also like to have botox done in the future but would like to avoid upper eyebrows due to history of feeling heavy. Instructed her to have her visual field testing performed prior to botox  - once visual field testing obtained, will have patient scheduled to see Dr. Szymanski for upper blepharoplasty consult  - will have scheduling reach out to patient to schedule lower lip lesion removal    Medical Problems       Problem List       Chronic fatigue    Generalized anxiety disorder    GERD (gastroesophageal reflux disease)    Major depression, recurrent (HCC)    Migraine with aura and without status migrainosus, not intractable    Varicose veins of leg with edema, unspecified laterality    Vitamin D deficiency    Anxiety    H/O seasonal allergies    Dysuria    Pressure in head    New daily persistent headache    Restless leg syndrome    Insomnia    Immunization due    Encounter for sterilization    Overview Signed 4/25/2019  5:04 PM by Amelia Lugo     Added automatically from request for surgery [id number]         Chronic idiopathic  constipation    BMI 35.0-35.9,adult    Dermatitis    Goiter    Breast cancer screening by mammogram    Other dysphagia    Other hyperlipidemia    Class 2 obesity due to excess calories without serious comorbidity with body mass index (BMI) of 36.0 to 36.9 in adult    Other sleep apnea    Ureteral stone          Reason for Consult / Principal Problem: right lower lip lesion    HPI: Riri Salas is a 49 y.o. year old female who presents with right lower lip lesion. Area has been present for approximately 1 year. She has been seeing dermatology for lip lesion along with other skin concerns. Area used to be raised and white. Lesion was frozen on 6/13/23 with dermatology. Lesion eventually crusted over and fell off. However, area has always had a white, flat area present since cryo. Area does not bleed. Area has never been biopsied. She does not have a history of skin cancer. She is worried area could be cancerous. We discussed lesion is likely benign. I had planned on performing a punch biopsy in the office today. After discussion, she decided to have area surgically removed. If area has negative margins, she would not need additional procedures performed. We did discuss if there were positive margins, she would need to have additional tissue removed, with either ourselves or favorably with MOHS. Consent obtained. Patient is not on a blood thinner.     Additionally, patient discussed a possible upper blepharoplasty. She describes her eyelids as heavy. She forces her eyes open. Pictures taken. We discussed she will need to have visual field testing performed prior to submitting request with insurance. She would also like to have botox in the future, but to avoid having heavy brows.       Review of Systems   Constitutional:  Negative for chills and fever.   HENT:  Negative for congestion and sore throat.    Respiratory:  Negative for shortness of breath.    Cardiovascular:  Negative for chest pain.    Gastrointestinal:  Negative for abdominal pain.   Skin:  Negative for rash and wound.   Psychiatric/Behavioral:  Negative for behavioral problems and confusion.          Past Medical History:  Past Medical History:   Diagnosis Date    Anxiety     GERD (gastroesophageal reflux disease)     occ    Hemorrhoids     Kidney stone 7/18/23    Migraine        Past Surgical History:  Past Surgical History:   Procedure Laterality Date    APPENDECTOMY  2008    DILATION AND CURETTAGE, DIAGNOSTIC / THERAPEUTIC      OH CYSTO/URETERO W/LITHOTRIPSY &INDWELL STENT INSRT Left 10/13/2023    Procedure: CYSTOSCOPY URETEROSCOPY WITH LITHOTRIPSY HOLMIUM LASER, RETROGRADE PYELOGRAM AND INSERTION STENT URETERAL;  Surgeon: Nadeem Bee MD;  Location:  MAIN OR;  Service: Urology    SKIN BIOPSY      UPPER GASTROINTESTINAL ENDOSCOPY      US GUIDED BREAST BIOPSY LEFT COMPLETE Left 11/22/2021    VARICOSE VEIN SURGERY Left 2012       Social History:  Social History     Substance and Sexual Activity   Alcohol Use Not Currently     Social History     Substance and Sexual Activity   Drug Use Never     Social History     Tobacco Use   Smoking Status Never    Passive exposure: Past   Smokeless Tobacco Never   Tobacco Comments    no passive smoke exposure       Family History:  Family History   Problem Relation Age of Onset    No Known Problems Mother     No Known Problems Father     No Known Problems Sister     Lymphoma Maternal Aunt 52    No Known Problems Maternal Grandmother     Skin cancer Maternal Grandfather     Uterine cancer Paternal Grandmother     No Known Problems Paternal Grandfather     No Known Problems Son     No Known Problems Son     Breast cancer Neg Hx        Allergies:  No Known Allergies    Medications:  (Not in a hospital admission)    No current facility-administered medications for this visit.       Vitals:  There were no vitals taken for this visit.  There is no height or weight on file to calculate BMI.  Weight (last  "2 days)       None            PHYSICAL EXAM  General appearance: alert and oriented, in no acute distress  Skin:  3mm flat, white, lesion to right lower lip, no bleeding, no fluid able to be expressed.   Head: Normocephalic, without obvious abnormality  Eyes: bilateral upper dermatochalasis  Heart:  RRR  Lungs:  normal respiratory effort  Abdomen: soft, non-tender; bowel sounds normal; no masses,  no organomegaly  Neurological: normal without focal findings and mental status, speech normal, alert and oriented x3  Extremities: normal ROM, no swelling    Lab Results and Cultures:   CBC with diff:   Lab Results   Component Value Date    WBC 7.05 10/06/2023    HGB 12.1 10/06/2023    HCT 39.5 10/06/2023    MCV 79 (L) 10/06/2023     10/06/2023    RBC 5.02 10/06/2023    MCH 24.1 (L) 10/06/2023    MCHC 30.6 (L) 10/06/2023    RDW 14.3 10/06/2023    MPV 12.1 10/06/2023    NRBC 0 10/06/2023      BMP/CMP:  Lab Results   Component Value Date    K 4.0 10/06/2023     10/06/2023    CO2 27 10/06/2023    BUN 18 10/06/2023    CREATININE 0.64 10/06/2023    CALCIUM 9.2 10/06/2023    AST 11 01/14/2022    ALT 19 01/14/2022    ALKPHOS 57 01/14/2022    EGFR 105 10/06/2023   ,     Coags: No results found for: \"PT\", \"PTT\", \"INR\",          Lipid Panel: No results found for: \"CHOL\"  Lab Results   Component Value Date    HDL 60 01/14/2022     Lab Results   Component Value Date    HDL 60 01/14/2022     Lab Results   Component Value Date    LDLCALC 128 (H) 01/14/2022     Lab Results   Component Value Date    TRIG 64 01/14/2022       HgbA1c:   Lab Results   Component Value Date    HGBA1C 5.2 10/27/2020    HGBA1C 5.1 08/02/2018    HGBA1C 5.5 09/19/2014       Blood Culture: No results found for: \"BLOODCX\",   Urinalysis:   Lab Results   Component Value Date    COLORU yellow 10/08/2023    COLORU Yellow 08/28/2023    CLARITYU clear 10/08/2023    CLARITYU Turbid 08/28/2023    SPECGRAV 1.020 08/28/2023    PHUR 7.0 08/28/2023    PHUR 7.0 " "10/05/2018    LEUKOCYTESUR neg 10/08/2023    LEUKOCYTESUR Trace (A) 08/28/2023    NITRITE neg 10/08/2023    NITRITE Negative 08/28/2023    GLUCOSEU neg 10/08/2023    GLUCOSEU Negative 08/28/2023    KETONESU neg 10/08/2023    KETONESU Negative 08/28/2023    BILIRUBINUR 40 10/08/2023    BILIRUBINUR Negative 08/28/2023    BLOODU trace 10/08/2023    BLOODU Large (A) 08/28/2023   ,   Urine Culture:   Lab Results   Component Value Date    URINECX 10,000-19,000 cfu/ml 10/08/2023   ,   Wound Culure:  No results found for: \"WOUNDCULT\"    Counseling / Coordination of Care  Total time spent today  45 minutes. Greater than 50% of total time was spent with the patient and / or family counseling and / or coordination of care.     Thank you for allowing me to participate in the care of Riri Salas. Please don't hesitate to call, text, email, or TigerText with any questions.     Yvrose Chatman PA-C       "

## 2024-03-13 ENCOUNTER — TELEPHONE (OUTPATIENT)
Dept: PLASTIC SURGERY | Facility: CLINIC | Age: 49
End: 2024-03-13

## 2024-03-13 NOTE — TELEPHONE ENCOUNTER
Called and left message regarding getting an update on if the patient went to get their visual field test done and to schedule a consultation with Dr. Szymanski for her upper bleph procedure. Advised patient to call me back at her earliest convenience.

## 2024-04-23 ENCOUNTER — OFFICE VISIT (OUTPATIENT)
Dept: PLASTIC SURGERY | Facility: CLINIC | Age: 49
End: 2024-04-23
Payer: COMMERCIAL

## 2024-04-23 DIAGNOSIS — H02.834 DERMATOCHALASIS OF BOTH UPPER EYELIDS: Primary | ICD-10-CM

## 2024-04-23 DIAGNOSIS — H02.831 DERMATOCHALASIS OF BOTH UPPER EYELIDS: Primary | ICD-10-CM

## 2024-04-23 PROCEDURE — 99215 OFFICE O/P EST HI 40 MIN: CPT | Performed by: STUDENT IN AN ORGANIZED HEALTH CARE EDUCATION/TRAINING PROGRAM

## 2024-04-23 NOTE — PROGRESS NOTES
Patient seen and examined.  Has questions regarding upper blepharoplasty and lip lesion, for which she saw Yvrose recently.    Bilateral upper lid dermatochalasis right > left with brow ptosis on right > left.  Right lower lip hypopigmented lesion    We discussed that after she obtains VFT, we can submit to insurance.  I would recommend a bow pexy on the right and possibly the left in conjunction with a bilateral upper blepharoplasty.  At the same time, we can perform an excisional biopsy of the lower lip lesion.    The patient will submit her VFT once completed.  Booking form completed.    Indu Szymanski, DO  Plastic and Reconstructive Surgery

## 2024-07-17 ENCOUNTER — TELEPHONE (OUTPATIENT)
Dept: PLASTIC SURGERY | Facility: CLINIC | Age: 49
End: 2024-07-17

## 2024-07-17 NOTE — TELEPHONE ENCOUNTER
Called and left message with patient to ask if she completed her VFT and if she'd like to proceed with surgery. Advised patient to call me back to discuss.

## 2024-11-25 ENCOUNTER — OFFICE VISIT (OUTPATIENT)
Dept: GYNECOLOGY | Facility: CLINIC | Age: 49
End: 2024-11-25
Payer: COMMERCIAL

## 2024-11-25 ENCOUNTER — NURSE TRIAGE (OUTPATIENT)
Age: 49
End: 2024-11-25

## 2024-11-25 VITALS — WEIGHT: 214 LBS | DIASTOLIC BLOOD PRESSURE: 86 MMHG | SYSTOLIC BLOOD PRESSURE: 136 MMHG | BODY MASS INDEX: 39.14 KG/M2

## 2024-11-25 DIAGNOSIS — N92.6 IRREGULAR MENSES: Primary | ICD-10-CM

## 2024-11-25 DIAGNOSIS — R63.5 WEIGHT GAIN: ICD-10-CM

## 2024-11-25 PROCEDURE — 99212 OFFICE O/P EST SF 10 MIN: CPT | Performed by: OBSTETRICS & GYNECOLOGY

## 2024-11-25 NOTE — PROGRESS NOTES
Patient presents to the office today to discuss irregular menstrual cycles and weight gain over the last few months.  Patient has lost weight through diet and exercise and has not changed anything.  Also over the last few months she has noticed irregular menses.  Did not get a menstrual cycle in October.  Her menstrual cycle just started last night and is present meaning menstruating.  Also complaining of an odor after intercourse without discharge.  Not using condoms.  Same partner.  Not using lubricants.  Denies any pelvic pain or abnormal bleeding.    Impression: Irregular menses.  Recent weight gain with no change in diet or exercise.    Plan: Check TSH.  Return to office in the next 2 weeks for annual exam

## 2024-11-25 NOTE — PROGRESS NOTES
Name: Riri Salas      : 1975      MRN: [id number]  Encounter Provider: Sourav Yeh MD  Encounter Date: 2024   Encounter department: [facility]  :  Assessment & Plan        History of Present Illness {?Quick Links Encounters * My Last Note * Last Note in Specialty * Snapshot * Since Last Visit * History :42111}    HPI  Riri Salas is a 49 y.o. female who presents ***  {History obtained from(Optional):98979}    Review of Systems  {Select to insert medical history sections (Optional):54993}     Objective {?Quick Links Trend Vitals * Enter New Vitals * Results Review * Timeline (Adult) * Labs * Imaging * Cardiology * Procedures * Lung Cancer Screening * Surgical eConsent :38017}  There were no vitals taken for this visit.     Physical Exam    {Administrative / Billing Section (Optional):76120}

## 2024-11-25 NOTE — TELEPHONE ENCOUNTER
"Patient called office c/o vaginal odor for a week. She denies discharge, itching, pain with urination, abdominal pain or any other symptoms to note. She states the odor has a chemical smell. She is concerned it could be BV. Scheduled office appointment today. She had yearly scheduled, she said she wanted to cancel yearly due to period, called office and spoke with Kelli, she was able to swith appointment to short office visit for patient. Patient states she will reschedule yearly at office today.         Reason for Disposition   Patient wants to be seen    Answer Assessment - Initial Assessment Questions  1. SYMPTOM: \"What's the main symptom you're concerned about?\" (e.g., pain, itching, dryness)      Intermittent Vaginal odor   2. LOCATION: \"Where is the  odor  located?\" (e.g., inside/outside, left/right)      Vaginal   3. ONSET: \"When did the  odor   start?\"      A week ago   4. PAIN: \"Is there any pain?\" If Yes, ask: \"How bad is it?\" (Scale: 1-10; mild, moderate, severe)      No   5. ITCHING: \"Is there any itching?\" If Yes, ask: \"How bad is it?\" (Scale: 1-10; mild, moderate, severe)      No   6. CAUSE: \"What do you think is causing the discharge?\" \"Have you had the same problem before?\" \"What happened then?\"      Unknown   7. OTHER SYMPTOMS: \"Do you have any other symptoms?\" (e.g., fever, itching, vaginal bleeding, pain with urination, injury to genital area, vaginal foreign body)      None   8. PREGNANCY: \"Is there any chance you are pregnant?\" \"When was your last menstrual period?\"      No.  LMP: 11/25/24    Protocols used: Vaginal Symptoms-Adult-[place]    "

## 2025-01-10 ENCOUNTER — OFFICE VISIT (OUTPATIENT)
Dept: GYNECOLOGY | Facility: CLINIC | Age: 50
End: 2025-01-10
Payer: COMMERCIAL

## 2025-01-10 VITALS
SYSTOLIC BLOOD PRESSURE: 113 MMHG | DIASTOLIC BLOOD PRESSURE: 61 MMHG | BODY MASS INDEX: 38.09 KG/M2 | HEIGHT: 62 IN | WEIGHT: 207 LBS

## 2025-01-10 DIAGNOSIS — Z12.31 SCREENING MAMMOGRAM FOR BREAST CANCER: ICD-10-CM

## 2025-01-10 DIAGNOSIS — N95.2 VAGINAL ATROPHY: Primary | ICD-10-CM

## 2025-01-10 PROCEDURE — 99215 OFFICE O/P EST HI 40 MIN: CPT | Performed by: OBSTETRICS & GYNECOLOGY

## 2025-01-10 RX ORDER — ESTRADIOL 0.1 MG/G
0.5 CREAM VAGINAL 2 TIMES WEEKLY
Qty: 42.5 G | Refills: 1 | Status: SHIPPED | OUTPATIENT
Start: 2025-01-13

## 2025-01-10 NOTE — PROGRESS NOTES
Assessment/Plan:    Discussed menopause/perimenopause/hormonal fluctuations at age 49 at length. Pt continues to get monthly menses. Discussed that if pt did not have migraine with aura, the best option would be Nuva Ring, but given that she cannot have estrogen at BC doses, we would need to treat with progestin only method as in Nexplanon or DMPA. Given weight gain, pt does not wish to use these methods.   Discussed vaginal atrophy and vaginal estrogen. Pt would like to start. Risks/benefits/instructions for use reviewed.  Also reviewed use of PNV, Nutrofol, and biotin hair care for hair loss.   Also advised to follow up for baseline mini mental status testing.     I have spent a total time of 52 minutes in caring for this patient on the day of the visit/encounter including Diagnostic results, Prognosis, Risks and benefits of tx options, Instructions for management, Patient and family education, Importance of tx compliance, Risk factor reductions, Impressions, Counseling / Coordination of care, Documenting in the medical record, Reviewing / ordering tests, medicine, procedures  , and Obtaining or reviewing history  .      Diagnoses and all orders for this visit:    Vaginal atrophy  -     estradiol (ESTRACE) 0.1 mg/g vaginal cream; Insert 0.5 g into the vagina 2 (two) times a week    Screening mammogram for breast cancer  -     Mammo screening bilateral w 3d and cad; Future        Subjective:      Patient ID: Riri Salas is a 49 y.o. female.    Pt presents for menopause consult.   LMP 12/29/24. She is sexually active.   Migraine with aura.  Most bothersome - brain fog, concentration., forgetfulness. 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   Overall dryness is bothersome with painful intercourse.   Moodiness, agitation all the time.    TSH ordered, 11/25/24 not done to date.   She just started a GLP 1 for weight loss and is pleased with progress.  Pt is also bothered  "by hair loss.   Breast biopsy 2021, no further breast imaging done.   Colonoscopy 7/19/21, 10 yr recall  Pap 2019.          The following portions of the patient's history were reviewed and updated as appropriate: allergies, current medications, past family history, past medical history, past social history, past surgical history and problem list.    Review of Systems   Constitutional: Negative.    HENT: Negative.     Respiratory: Negative.     Cardiovascular: Negative.    Gastrointestinal: Negative.    Endocrine: Negative.    Genitourinary:  Negative for difficulty urinating, dyspareunia, dysuria, frequency, menstrual problem, pelvic pain, urgency, vaginal bleeding, vaginal discharge and vaginal pain.   Musculoskeletal: Negative.    Skin: Negative.    Neurological: Negative.    Psychiatric/Behavioral: Negative.           Objective:      /61 (BP Location: Left arm, Patient Position: Sitting, Cuff Size: Standard)   Ht 5' 2\" (1.575 m)   Wt 93.9 kg (207 lb)   LMP 12/29/2024 (Exact Date)   BMI 37.86 kg/m²          Physical Exam  Vitals and nursing note reviewed. Exam conducted with a chaperone present.   Constitutional:       Appearance: Normal appearance.   HENT:      Head: Normocephalic and atraumatic.   Pulmonary:      Effort: Pulmonary effort is normal.   Musculoskeletal:      Cervical back: Normal range of motion.   Skin:     General: Skin is warm and dry.   Neurological:      Mental Status: She is alert and oriented to person, place, and time.   Psychiatric:         Mood and Affect: Mood normal.         Behavior: Behavior normal.         Thought Content: Thought content normal.         Judgment: Judgment normal.         "

## (undated) DEVICE — SCD SEQUENTIAL COMPRESSION COMFORT SLEEVE MEDIUM KNEE LENGTH: Brand: KENDALL SCD

## (undated) DEVICE — POV-IOD SOLUTION 4OZ BT

## (undated) DEVICE — FIBER LASER HOLIUM 365 MICRON

## (undated) DEVICE — TUBING SUCTION 5MM X 12 FT

## (undated) DEVICE — ASTOUND STANDARD SURGICAL GOWN, XL: Brand: CONVERTORS

## (undated) DEVICE — SHEATH URETERAL ACCESS 10/12FR 35CM PROXIS

## (undated) DEVICE — STERILE SURGICAL LUBRICANT,  TUBE: Brand: SURGILUBE

## (undated) DEVICE — 3M™ TEGADERM™ CHG DRESSING 25/CARTON 4 CARTONS/CASE 1659: Brand: TEGADERM™

## (undated) DEVICE — INVIEW CLEAR LEGGINGS: Brand: CONVERTORS

## (undated) DEVICE — SPECIMEN CONTAINER STERILE PEEL PACK

## (undated) DEVICE — PREMIUM DRY TRAY LF: Brand: MEDLINE INDUSTRIES, INC.

## (undated) DEVICE — SYRINGE 20ML LL

## (undated) DEVICE — GUIDEWIRE STRGHT TIP 0.035 IN  SOLO PLUS

## (undated) DEVICE — STERILE POLYISOPRENE POWDER-FREE SURGICAL GLOVES: Brand: PROTEXIS

## (undated) DEVICE — UROLOGIC DRAIN BAG: Brand: UNBRANDED

## (undated) DEVICE — SINGLE PORT MANIFOLD: Brand: NEPTUNE 2

## (undated) DEVICE — PACK TUR

## (undated) DEVICE — BASKET SPECIMEN RETRIVAL 1.9FR 120CM